# Patient Record
Sex: FEMALE | Race: WHITE | Employment: FULL TIME | URBAN - METROPOLITAN AREA
[De-identification: names, ages, dates, MRNs, and addresses within clinical notes are randomized per-mention and may not be internally consistent; named-entity substitution may affect disease eponyms.]

---

## 2019-09-17 ENCOUNTER — OFFICE VISIT (OUTPATIENT)
Dept: OBGYN CLINIC | Facility: CLINIC | Age: 54
End: 2019-09-17
Payer: COMMERCIAL

## 2019-09-17 VITALS — BODY MASS INDEX: 24.76 KG/M2 | DIASTOLIC BLOOD PRESSURE: 84 MMHG | WEIGHT: 142 LBS | SYSTOLIC BLOOD PRESSURE: 122 MMHG

## 2019-09-17 DIAGNOSIS — R30.0 DYSURIA: ICD-10-CM

## 2019-09-17 DIAGNOSIS — N81.2 INCOMPLETE UTEROVAGINAL PROLAPSE: Primary | ICD-10-CM

## 2019-09-17 LAB
BACTERIA UR QL AUTO: NORMAL /HPF
BILIRUB UR QL STRIP: NEGATIVE
CLARITY UR: CLEAR
COLOR UR: YELLOW
GLUCOSE UR STRIP-MCNC: NEGATIVE MG/DL
HGB UR QL STRIP.AUTO: ABNORMAL
HYALINE CASTS #/AREA URNS LPF: NORMAL /LPF
KETONES UR STRIP-MCNC: NEGATIVE MG/DL
LEUKOCYTE ESTERASE UR QL STRIP: ABNORMAL
NITRITE UR QL STRIP: NEGATIVE
NON-SQ EPI CELLS URNS QL MICRO: NORMAL /HPF
PH UR STRIP.AUTO: 6.5 [PH]
PROT UR STRIP-MCNC: NEGATIVE MG/DL
RBC #/AREA URNS AUTO: NORMAL /HPF
SP GR UR STRIP.AUTO: 1.01 (ref 1–1.03)
UROBILINOGEN UR QL STRIP.AUTO: 0.2 E.U./DL
WBC #/AREA URNS AUTO: NORMAL /HPF

## 2019-09-17 PROCEDURE — 99214 OFFICE O/P EST MOD 30 MIN: CPT | Performed by: OBSTETRICS & GYNECOLOGY

## 2019-09-17 PROCEDURE — 81001 URINALYSIS AUTO W/SCOPE: CPT | Performed by: OBSTETRICS & GYNECOLOGY

## 2019-09-17 PROCEDURE — 87086 URINE CULTURE/COLONY COUNT: CPT | Performed by: OBSTETRICS & GYNECOLOGY

## 2019-09-17 RX ORDER — ESTRADIOL 0.1 MG/G
1 CREAM VAGINAL 2 TIMES WEEKLY
Qty: 1 G | Refills: 1 | Status: SHIPPED | OUTPATIENT
Start: 2019-09-19 | End: 2019-10-01 | Stop reason: SDUPTHER

## 2019-09-17 RX ORDER — LEVOTHYROXINE SODIUM 0.12 MG/1
125 TABLET ORAL DAILY
COMMUNITY
Start: 2017-10-15 | End: 2020-02-18 | Stop reason: SDUPTHER

## 2019-09-17 NOTE — PROGRESS NOTES
CC:   Dysuria and pelvic pressure    HPI: Dinesh Epstein presents for evaluation of pelvic pressure that has been most noticeable over the past 2 weeks  The patient also has had episodes of dysuria  This patient who has not been seen by our office for almost 3 years, states that she has been slowly noticing pain and pressure in the pelvic area after prolonged standing and while sitting  She now claims to not be able to hold her urine with leakage  She had an ablation so she is not sure she is menopausal, but no in her family has ever had heat flashes or night sweats  Past Medical History:  No past medical history on file  Past Surgical History:  Past Surgical History:   Procedure Laterality Date    THYROIDECTOMY         Past OB/Gyn History:    Patient is status post ablation    ALLERGIES:   Allergies   Allergen Reactions    Morphine Other (See Comments)       MEDS:   Current Outpatient Medications:     levothyroxine 125 mcg tablet    [START ON 9/19/2019] estradiol (ESTRACE) 0 1 mg/g vaginal cream    Review of Systems:  Skin: No rashes or discolorations of any concern  RESP: Denies SOB, no cough  CV: Denies chest pain or palpitations  Breasts: Denies masses, pain, skin changes and nipple discharge  GI: Denies abdominal pain, heartburn, nausea, vomiting, changes in bowel habits  : Denies  frequency, CVA tenderness, and hematuria  Positive for dysuria,  and incontinence  Genitalia: Denies abnormal vaginal discharge, external lesions, rashes,  abnormal bleeding  Positive for pelvic pain,and pressure  Rectal:  Denies pain, bleeding, hemorrhoids,    Physical Exam:  /84 (BP Location: Left arm, Patient Position: Sitting, Cuff Size: Standard)   Wt 64 4 kg (142 lb)   BMI 24 76 kg/m²    Gen: The patient was alert and oriented x3, pleasant well-appearing female in no acute distress     Abd:  Soft, nontender, nondistended, no masses or organomegaly  Back:  No CVA tenderness, no tenderness to palpation along spine  Pelvic;  Normal appearing external female genitalia, no visible lesions, no rashes  Vagina shows evidence of a large midline cystocele with uterine descensus more than 2/3 of the way down the vaginal canal   There is significant descensus of the UV angle with straining  Normal appearing cervix, mobile and nontender  Uterus is atrophic in size, mobile and, nontender  No palpable adnexal masses or tenderness  No anoperineal lesions  Skin:  No concerning lesions  Extremeties: No edema      Assessment & Plan:   1  Incomplete uterovaginal prolapse with urethral hypermobility  I reviewed the findings with Wilma and discuss the options which included non intervention, pessary, and corrective surgery  After further discussion the patient is very much interested in pursuing a vaginal hysterectomy, cystocele repair with suburethral sling  Information regarding these procedures was given to the patient  Additionally, I strongly advocated that she get started on vaginal estrogen to further strengthen the tissue in preparation for the upcoming surgery which at this point looks to be late December  The patient is overdue for a Pap smear and she will return in October to have this performed with her yearly visit  She was encouraged to call with any questions and or concerns  I have spent 32 minutes with Patient  today in which greater than 50% of this time was spent in counseling/coordination of care regarding Prognosis, Risks and benefits of tx options, Intructions for management, Patient and family education, Importance of tx compliance and Risk factor reductions

## 2019-09-18 ENCOUNTER — TELEPHONE (OUTPATIENT)
Dept: OBGYN CLINIC | Facility: CLINIC | Age: 54
End: 2019-09-18

## 2019-09-18 LAB — BACTERIA UR CULT: ABNORMAL

## 2019-09-18 NOTE — TELEPHONE ENCOUNTER
----- Message from Abimael Haas MD sent at 9/18/2019  2:51 PM EDT -----  Please tell patient she did not have a bladder infection

## 2019-10-01 ENCOUNTER — ANNUAL EXAM (OUTPATIENT)
Dept: OBGYN CLINIC | Facility: CLINIC | Age: 54
End: 2019-10-01
Payer: COMMERCIAL

## 2019-10-01 VITALS — SYSTOLIC BLOOD PRESSURE: 110 MMHG | DIASTOLIC BLOOD PRESSURE: 80 MMHG | BODY MASS INDEX: 24.24 KG/M2 | WEIGHT: 139 LBS

## 2019-10-01 DIAGNOSIS — Z12.4 SCREENING FOR CERVICAL CANCER: ICD-10-CM

## 2019-10-01 DIAGNOSIS — Z12.31 ENCOUNTER FOR SCREENING MAMMOGRAM FOR MALIGNANT NEOPLASM OF BREAST: ICD-10-CM

## 2019-10-01 DIAGNOSIS — Z01.411 ENCOUNTER FOR GYNECOLOGICAL EXAMINATION WITH ABNORMAL FINDING: Primary | ICD-10-CM

## 2019-10-01 DIAGNOSIS — N81.2 INCOMPLETE UTEROVAGINAL PROLAPSE: ICD-10-CM

## 2019-10-01 PROCEDURE — G0145 SCR C/V CYTO,THINLAYER,RESCR: HCPCS | Performed by: OBSTETRICS & GYNECOLOGY

## 2019-10-01 PROCEDURE — 99396 PREV VISIT EST AGE 40-64: CPT | Performed by: OBSTETRICS & GYNECOLOGY

## 2019-10-01 PROCEDURE — 87624 HPV HI-RISK TYP POOLED RSLT: CPT | Performed by: OBSTETRICS & GYNECOLOGY

## 2019-10-01 RX ORDER — ESTRADIOL 0.1 MG/G
1 CREAM VAGINAL 2 TIMES WEEKLY
Qty: 1 G | Refills: 1 | Status: SHIPPED | OUTPATIENT
Start: 2019-10-03 | End: 2022-05-28

## 2019-10-01 NOTE — PROGRESS NOTES
CC:  Annual exam    HPI: Messi Farooq presents for yearly visit  Wilma was seen 2 weeks ago where she had incomplete uterovaginal prolapse  She had further questions in relation to the procedure  She also needed a repeat print of the prescription for Estrace cream     Past Medical History:  History reviewed  No pertinent past medical history  Past Surgical History:  Past Surgical History:   Procedure Laterality Date    THYROIDECTOMY         Past OB/Gyn History:  Patient is menopausal   Denies any history of sexually transmitted infection  No history of abnormal pap smears  Her last pap smear was over 3 years ago  ALLERGIES:   Allergies   Allergen Reactions    Morphine Other (See Comments)       MEDS:   Current Outpatient Medications:     [START ON 10/3/2019] estradiol (ESTRACE) 0 1 mg/g vaginal cream    levothyroxine 125 mcg tablet    Family History:  History reviewed  No pertinent family history      Social History:  Social History     Socioeconomic History    Marital status: /Civil Union     Spouse name: Not on file    Number of children: Not on file    Years of education: Not on file    Highest education level: Not on file   Occupational History    Not on file   Social Needs    Financial resource strain: Not on file    Food insecurity:     Worry: Not on file     Inability: Not on file    Transportation needs:     Medical: Not on file     Non-medical: Not on file   Tobacco Use    Smoking status: Smoker, Current Status Unknown     Types: Cigarettes    Smokeless tobacco: Never Used   Substance and Sexual Activity    Alcohol use: Yes     Comment: occasionally    Drug use: Not Currently    Sexual activity: Not Currently   Lifestyle    Physical activity:     Days per week: Not on file     Minutes per session: Not on file    Stress: Not on file   Relationships    Social connections:     Talks on phone: Not on file     Gets together: Not on file     Attends Baptism service: Not on file     Active member of club or organization: Not on file     Attends meetings of clubs or organizations: Not on file     Relationship status: Not on file    Intimate partner violence:     Fear of current or ex partner: Not on file     Emotionally abused: Not on file     Physically abused: Not on file     Forced sexual activity: Not on file   Other Topics Concern    Not on file   Social History Narrative    Not on file         Review of Systems:  Gen:   Denies fatigue, chills, nausea, vomiting, fever  Skin: No rashes or discolorations of any concern  RESP: Denies SOB, no cough  CV: Denies chest pain or palpitations  Breasts: Denies masses, pain, skin changes and nipple discharge  GI: Denies abdominal pain, heartburn, nausea, vomiting, changes in bowel habits  : Denies dysuria, frequency, CVA tenderness, incontinence and hematuria  Genitalia: Denies abnormal vaginal discharge, external lesions, rashes, pelvic pain, pressure, abnormal bleeding  Rectal:  Denies pain, bleeding, hemorrhoids,    Physical Exam:  /80 (BP Location: Left arm, Patient Position: Sitting, Cuff Size: Standard)   Wt 63 kg (139 lb)   LMP  (Exact Date)   BMI 24 24 kg/m²    Gen: The patient was alert and oriented x3, pleasant well-appearing female in no acute distress  Neck:  Unremarkable, no lymphadenopathy, no thyromegaly, or tenderness  CV:  RRR, no murmurs  Resp:  Clear to auscultation bilaterally, no wheezing  Breasts: Symmetric  No dominant, discrete, fixed  or suspicious masses are noted  No skin or nipple changes  No palpable axillary nodes  No supraclavicular adenopathy  Abd:  Soft, nontender, nondistended, no masses or organomegaly  Back:  No CVA tenderness, no tenderness to palpation along spine  Pelvic  Normal appearing external female genitalia, no visible lesions, no rashes  Noted is a marked midline cystocele and evidence of the uterine prolapse almost to the hymenal ring area   Normal appearing cervix, mobile and nontender  A thin prep pap smear was obtained today  Uterus is normal size, mobile and, nontender  No palpable adnexal masses or tenderness  No anoperineal lesions  Rectal:  No masses, tenderness, hemorrhoids, or obvious blood  Skin:  No concerning lesions  Extremeties: No edema      Assessment & Plan:   1  Routine annual exam    Pap smear performed  RTO one year orPRN  2  Encounter for screening mammogram, referral for mammogram given to patient  3    Incomplete uterovaginal prolapse  Patient will be seen in several weeks to review the procedure, and to sign surgical consent form

## 2019-10-02 LAB
HPV HR 12 DNA CVX QL NAA+PROBE: NEGATIVE
HPV16 DNA CVX QL NAA+PROBE: NEGATIVE
HPV18 DNA CVX QL NAA+PROBE: NEGATIVE

## 2019-10-07 LAB
LAB AP GYN PRIMARY INTERPRETATION: NORMAL
Lab: NORMAL

## 2019-10-14 ENCOUNTER — OFFICE VISIT (OUTPATIENT)
Dept: OBGYN CLINIC | Facility: CLINIC | Age: 54
End: 2019-10-14
Payer: COMMERCIAL

## 2019-10-14 VITALS — BODY MASS INDEX: 23.89 KG/M2 | SYSTOLIC BLOOD PRESSURE: 122 MMHG | WEIGHT: 137 LBS | DIASTOLIC BLOOD PRESSURE: 82 MMHG

## 2019-10-14 DIAGNOSIS — N81.2 UTEROVAGINAL PROLAPSE, INCOMPLETE: Primary | ICD-10-CM

## 2019-10-14 DIAGNOSIS — N36.41 URETHRAL HYPERMOBILITY: ICD-10-CM

## 2019-10-14 PROCEDURE — 99214 OFFICE O/P EST MOD 30 MIN: CPT | Performed by: OBSTETRICS & GYNECOLOGY

## 2019-10-14 NOTE — PROGRESS NOTES
CC:   Cystocele with urethral hypermobility and uterine prolapse  HPI: My Main presents for discussion and scheduling of surgery as it relates to her large cystocele, urethral hypermobility and   Uterine prolapse  This patient who was found to have a large cystocele, urinary leakage and urethral hypermobility in addition to uterine prolapse, would like to proceed with the recommendations that we previously discussed  This would be a cystocele repair, TVT O procedure with cystoscopy, and a vaginal hysterectomy  The patient and I reviewed the risks and benefits, pros and cons of the procedure, including alternatives  A pamphlet, going over the procedure was also given to the patient  The patient agrees to the same and was personally consented by myself  Past Medical History:  No past medical history on file  Past Surgical History:  Past Surgical History:   Procedure Laterality Date    THYROIDECTOMY         Past OB/Gyn History:    Patient is menopausal   Denies any history of sexually transmitted infection  No history of abnormal pap smears  Her last pap smear was this month and normal     ALLERGIES:   Allergies   Allergen Reactions    Morphine Other (See Comments)       MEDS:   Current Outpatient Medications:     estradiol (ESTRACE) 0 1 mg/g vaginal cream    levothyroxine 125 mcg tablet    Family History:  No family history on file      Social History:  Social History     Socioeconomic History    Marital status: /Civil Union     Spouse name: Not on file    Number of children: Not on file    Years of education: Not on file    Highest education level: Not on file   Occupational History    Not on file   Social Needs    Financial resource strain: Not on file    Food insecurity:     Worry: Not on file     Inability: Not on file    Transportation needs:     Medical: Not on file     Non-medical: Not on file   Tobacco Use    Smoking status: Smoker, Current Status Unknown     Types: Cigarettes    Smokeless tobacco: Never Used   Substance and Sexual Activity    Alcohol use: Yes     Comment: occasionally    Drug use: Not Currently    Sexual activity: Not Currently   Lifestyle    Physical activity:     Days per week: Not on file     Minutes per session: Not on file    Stress: Not on file   Relationships    Social connections:     Talks on phone: Not on file     Gets together: Not on file     Attends Quaker service: Not on file     Active member of club or organization: Not on file     Attends meetings of clubs or organizations: Not on file     Relationship status: Not on file    Intimate partner violence:     Fear of current or ex partner: Not on file     Emotionally abused: Not on file     Physically abused: Not on file     Forced sexual activity: Not on file   Other Topics Concern    Not on file   Social History Narrative    Not on file         Review of Systems:  Gen:   Denies fatigue, chills, nausea, vomiting, fever  Skin: No rashes or discolorations of any concern  RESP: Denies SOB, no cough  CV: Denies chest pain or palpitations  Breasts: Denies masses, pain, skin changes and nipple discharge  GI: Denies abdominal pain, heartburn, nausea, vomiting, changes in bowel habits  : Denies dysuria, frequency, CVA tenderness, incontinence and hematuria  Genitalia: Denies abnormal vaginal discharge, external lesions, rashes, pelvic pain, pressure, abnormal bleeding  Rectal:  Denies pain, bleeding, hemorrhoids,    Physical Exam:  /82 (BP Location: Left arm, Patient Position: Sitting, Cuff Size: Standard)   Wt 62 1 kg (137 lb)   BMI 23 89 kg/m²    Gen: The patient was alert and oriented x3, pleasant well-appearing female in no acute distress  Neck:  Unremarkable, no lymphadenopathy, no thyromegaly, or tenderness    CV:  RRR, no murmurs  Resp:  Clear to auscultation bilaterally, no wheezing  Abd:  Soft, nontender, nondistended, no masses or organomegaly  Back:  No CVA tenderness, no tenderness to palpation along spine  Pelvic  Normal appearing external female genitalia, no visible lesions, no rashes  Vagina is free of discharge, normal vaginal epithelium, no abnormal  Lesions, moderate cystocele with urethral hypermobility is noted  The cervix projects down to the hymenal ring area and the uterus is normal size hyper mobile and nontender  No anoperineal lesions  Rectal:  No masses, tenderness, hemorrhoids, or obvious blood  Skin:  No concerning lesions  Extremeties: No edema      Assessment & Plan:   1  Uterine prolapse, incomplete with cystocele and urethral hypermobility  Plan is for vaginal hysterectomy, cystocele repair and TVT O procedure with cystoscopy

## 2019-10-18 PROBLEM — N36.41 URETHRAL HYPERMOBILITY: Status: ACTIVE | Noted: 2019-10-18

## 2019-10-18 PROBLEM — N81.2 INCOMPLETE UTEROVAGINAL PROLAPSE: Status: ACTIVE | Noted: 2019-10-18

## 2019-10-21 ENCOUNTER — TELEPHONE (OUTPATIENT)
Dept: OBGYN CLINIC | Facility: CLINIC | Age: 54
End: 2019-10-21

## 2019-10-21 NOTE — TELEPHONE ENCOUNTER
Spoke to Mean at [facility] no prior Authorization is needed for CPT [id number] and [id number] being performed on 11/13/2019 with Dr Lizet Hines       Ref# [id number]

## 2019-10-28 ENCOUNTER — APPOINTMENT (OUTPATIENT)
Dept: LAB | Facility: HOSPITAL | Age: 54
End: 2019-10-28
Payer: COMMERCIAL

## 2019-10-28 DIAGNOSIS — Z01.818 PRE-OP TESTING: ICD-10-CM

## 2019-10-28 LAB
ABO GROUP BLD: NORMAL
ALBUMIN SERPL BCP-MCNC: 4.1 G/DL (ref 3–5.2)
ALP SERPL-CCNC: 54 U/L (ref 43–122)
ALT SERPL W P-5'-P-CCNC: 10 U/L (ref 9–52)
ANION GAP SERPL CALCULATED.3IONS-SCNC: 7 MMOL/L (ref 5–14)
AST SERPL W P-5'-P-CCNC: 30 U/L (ref 14–36)
BILIRUB SERPL-MCNC: 0.3 MG/DL
BLD GP AB SCN SERPL QL: NEGATIVE
BUN SERPL-MCNC: 17 MG/DL (ref 5–25)
CALCIUM SERPL-MCNC: 9.6 MG/DL (ref 8.4–10.2)
CHLORIDE SERPL-SCNC: 107 MMOL/L (ref 97–108)
CO2 SERPL-SCNC: 25 MMOL/L (ref 22–30)
CREAT SERPL-MCNC: 0.66 MG/DL (ref 0.6–1.2)
ERYTHROCYTE [DISTWIDTH] IN BLOOD BY AUTOMATED COUNT: 14.7 %
EST. AVERAGE GLUCOSE BLD GHB EST-MCNC: 114 MG/DL
GFR SERPL CREATININE-BSD FRML MDRD: 100 ML/MIN/1.73SQ M
GLUCOSE P FAST SERPL-MCNC: 91 MG/DL (ref 70–99)
HBA1C MFR BLD: 5.6 % (ref 4.2–6.3)
HCT VFR BLD AUTO: 43.5 % (ref 36–46)
HGB BLD-MCNC: 14.2 G/DL (ref 12–16)
MCH RBC QN AUTO: 29.5 PG (ref 26–34)
MCHC RBC AUTO-ENTMCNC: 32.7 G/DL (ref 31–36)
MCV RBC AUTO: 90 FL (ref 80–100)
PLATELET # BLD AUTO: 231 THOUSANDS/UL (ref 150–450)
PMV BLD AUTO: 9.4 FL (ref 8.9–12.7)
POTASSIUM SERPL-SCNC: 4.1 MMOL/L (ref 3.6–5)
PROT SERPL-MCNC: 7.2 G/DL (ref 5.9–8.4)
RBC # BLD AUTO: 4.81 MILLION/UL (ref 4–5.2)
RH BLD: POSITIVE
SODIUM SERPL-SCNC: 139 MMOL/L (ref 137–147)
SPECIMEN EXPIRATION DATE: NORMAL
WBC # BLD AUTO: 8.6 THOUSAND/UL (ref 4.5–11)

## 2019-10-28 PROCEDURE — 86900 BLOOD TYPING SEROLOGIC ABO: CPT

## 2019-10-28 PROCEDURE — 36415 COLL VENOUS BLD VENIPUNCTURE: CPT

## 2019-10-28 PROCEDURE — 86850 RBC ANTIBODY SCREEN: CPT

## 2019-10-28 PROCEDURE — 80053 COMPREHEN METABOLIC PANEL: CPT

## 2019-10-28 PROCEDURE — 83036 HEMOGLOBIN GLYCOSYLATED A1C: CPT

## 2019-10-28 PROCEDURE — 85027 COMPLETE CBC AUTOMATED: CPT

## 2019-10-28 PROCEDURE — 86901 BLOOD TYPING SEROLOGIC RH(D): CPT

## 2019-11-05 ENCOUNTER — TELEPHONE (OUTPATIENT)
Dept: OBGYN CLINIC | Facility: CLINIC | Age: 54
End: 2019-11-05

## 2019-11-05 NOTE — TELEPHONE ENCOUNTER
Patient called and stated she was scheduled for a procedure on 11/13/2019 and needed to reschedule this because of a family emergency  Patient states she would like to push back the surgery to May of 2020  Patient was given a surgery date of 5/27/2020

## 2019-11-05 NOTE — TELEPHONE ENCOUNTER
Spoke to Kate at [employer] no prior authorization is needed for CPT code 97147 being performed on 11/13/2019 with Dr Ashwin Lazar

## 2019-11-05 NOTE — TELEPHONE ENCOUNTER
Okay thanks for the update  There was a patient that may want that date since I believe she wish to have a as soon as possible surgery date  Please get in touch with me

## 2019-11-29 ENCOUNTER — TELEPHONE (OUTPATIENT)
Dept: OBGYN CLINIC | Facility: CLINIC | Age: 54
End: 2019-11-29

## 2020-05-05 ENCOUNTER — TELEPHONE (OUTPATIENT)
Dept: OBGYN CLINIC | Facility: CLINIC | Age: 55
End: 2020-05-05

## 2020-05-26 ENCOUNTER — TELEPHONE (OUTPATIENT)
Dept: OBGYN CLINIC | Facility: CLINIC | Age: 55
End: 2020-05-26

## 2020-10-16 ENCOUNTER — OFFICE VISIT (OUTPATIENT)
Dept: OBGYN CLINIC | Facility: CLINIC | Age: 55
End: 2020-10-16
Payer: COMMERCIAL

## 2020-10-16 VITALS
HEIGHT: 63 IN | WEIGHT: 134.6 LBS | DIASTOLIC BLOOD PRESSURE: 80 MMHG | SYSTOLIC BLOOD PRESSURE: 106 MMHG | BODY MASS INDEX: 23.85 KG/M2 | TEMPERATURE: 97.6 F

## 2020-10-16 DIAGNOSIS — R10.2 PELVIC PAIN: Primary | ICD-10-CM

## 2020-10-16 LAB
BACTERIA UR QL AUTO: ABNORMAL /HPF
BILIRUB UR QL STRIP: NEGATIVE
CLARITY UR: CLEAR
COLOR UR: YELLOW
GLUCOSE UR STRIP-MCNC: NEGATIVE MG/DL
HGB UR QL STRIP.AUTO: ABNORMAL
HYALINE CASTS #/AREA URNS LPF: ABNORMAL /LPF
KETONES UR STRIP-MCNC: NEGATIVE MG/DL
LEUKOCYTE ESTERASE UR QL STRIP: ABNORMAL
NITRITE UR QL STRIP: NEGATIVE
NON-SQ EPI CELLS URNS QL MICRO: ABNORMAL /HPF
PH UR STRIP.AUTO: 6.5 [PH]
PROT UR STRIP-MCNC: NEGATIVE MG/DL
RBC #/AREA URNS AUTO: ABNORMAL /HPF
SP GR UR STRIP.AUTO: 1.01 (ref 1–1.03)
UROBILINOGEN UR QL STRIP.AUTO: 0.2 E.U./DL
WBC #/AREA URNS AUTO: ABNORMAL /HPF

## 2020-10-16 PROCEDURE — 81001 URINALYSIS AUTO W/SCOPE: CPT | Performed by: OBSTETRICS & GYNECOLOGY

## 2020-10-16 PROCEDURE — 99213 OFFICE O/P EST LOW 20 MIN: CPT | Performed by: OBSTETRICS & GYNECOLOGY

## 2020-10-16 PROCEDURE — 87086 URINE CULTURE/COLONY COUNT: CPT | Performed by: OBSTETRICS & GYNECOLOGY

## 2020-10-16 RX ORDER — LEVOTHYROXINE SODIUM 0.1 MG/1
100 TABLET ORAL DAILY
COMMUNITY
Start: 2020-10-07

## 2020-10-17 LAB — BACTERIA UR CULT: NORMAL

## 2020-10-19 ENCOUNTER — TELEPHONE (OUTPATIENT)
Dept: OBGYN CLINIC | Facility: CLINIC | Age: 55
End: 2020-10-19

## 2020-11-03 ENCOUNTER — ULTRASOUND (OUTPATIENT)
Dept: OBGYN CLINIC | Facility: CLINIC | Age: 55
End: 2020-11-03
Payer: COMMERCIAL

## 2020-11-03 DIAGNOSIS — R10.2 PELVIC PAIN: Primary | ICD-10-CM

## 2020-11-03 PROCEDURE — 76830 TRANSVAGINAL US NON-OB: CPT | Performed by: OBSTETRICS & GYNECOLOGY

## 2021-03-10 DIAGNOSIS — Z23 ENCOUNTER FOR IMMUNIZATION: ICD-10-CM

## 2021-06-10 ENCOUNTER — TELEPHONE (OUTPATIENT)
Dept: OBGYN CLINIC | Facility: CLINIC | Age: 56
End: 2021-06-10

## 2021-06-10 NOTE — TELEPHONE ENCOUNTER
----- Message from Vernon Godoy MD sent at 6/9/2021  5:34 PM EDT -----  Regarding: RE: [address] [facility] , okay !  ----- Message -----  From: Catherine Ball MA  Sent: 6/9/2021   3:58 PM EDT  To: Vernon Godoy MD  Subject: SURGERY                                          Patient daughter called stating patient was scheduled for a HYSTERECTOMY VAGINAL TOTAL cystocele repair , INSERTION PUBOVAGINAL SLING   with cystocopy   ANTERIOR COLPORRHAPHY on 5/27/2020 that was canceled due to María  Patient daughter stated that Wilma prolapse has gotten worse and needs to reschedule the surgery  She has an appointment on 6/15/2021  to come in and be checked and schedule surgery  Patient is written in the surgery book for 7/14/2021

## 2021-06-15 ENCOUNTER — OFFICE VISIT (OUTPATIENT)
Dept: OBGYN CLINIC | Facility: CLINIC | Age: 56
End: 2021-06-15
Payer: COMMERCIAL

## 2021-06-15 VITALS
HEIGHT: 63 IN | WEIGHT: 139.4 LBS | SYSTOLIC BLOOD PRESSURE: 110 MMHG | DIASTOLIC BLOOD PRESSURE: 68 MMHG | BODY MASS INDEX: 24.7 KG/M2

## 2021-06-15 DIAGNOSIS — N36.41 URETHRAL HYPERMOBILITY: ICD-10-CM

## 2021-06-15 DIAGNOSIS — N81.11 MIDLINE CYSTOCELE: Primary | ICD-10-CM

## 2021-06-15 DIAGNOSIS — N81.2 INCOMPLETE UTEROVAGINAL PROLAPSE: ICD-10-CM

## 2021-06-15 PROCEDURE — 99214 OFFICE O/P EST MOD 30 MIN: CPT | Performed by: OBSTETRICS & GYNECOLOGY

## 2021-06-15 NOTE — PROGRESS NOTES
CC:   prolapse    HPI: Delmar Lawler presents for re-examination and discussion of her prolapse issues  This patient who is known to me for having a cystocele and stress urinary incontinence, also had partial uterine prolapse  The patient 2 years ago was prepared to undergo corrective surgery but because of COVID decided to postpone this  She is now seen today also desirous of surgical intervention  She continues to have leakage with coughing and sneezing along with sensation of incomplete emptying and pelvic pressure  Past Medical History:  History reviewed  No pertinent past medical history  Past Surgical History:  Past Surgical History:   Procedure Laterality Date    THYROIDECTOMY         Past OB/Gyn History:    Noncontributory    ALLERGIES:   Allergies   Allergen Reactions    Morphine Other (See Comments)       MEDS:   Current Outpatient Medications:     ergocalciferol (VITAMIN D2) 50,000 units    estradiol (ESTRACE) 0 1 mg/g vaginal cream    levothyroxine 100 mcg tablet    levothyroxine 125 mcg tablet    Family History:  History reviewed  No pertinent family history      Social History:  Social History     Socioeconomic History    Marital status: /Civil Union     Spouse name: Not on file    Number of children: Not on file    Years of education: Not on file    Highest education level: Not on file   Occupational History    Not on file   Tobacco Use    Smoking status: Smoker, Current Status Unknown     Packs/day: 0 25     Types: Cigarettes    Smokeless tobacco: Never Used   Substance and Sexual Activity    Alcohol use: Yes     Comment: occasionally    Drug use: Not Currently    Sexual activity: Not Currently   Other Topics Concern    Not on file   Social History Narrative    Not on file     Social Determinants of Health     Financial Resource Strain:     Difficulty of Paying Living Expenses:    Food Insecurity:     Worried About Running Out of Food in the Last Year:     Ran Out of Food in the Last Year:    Transportation Needs:     Lack of Transportation (Medical):  Lack of Transportation (Non-Medical):    Physical Activity:     Days of Exercise per Week:     Minutes of Exercise per Session:    Stress:     Feeling of Stress :    Social Connections:     Frequency of Communication with Friends and Family:     Frequency of Social Gatherings with Friends and Family:     Attends Christian Services:     Active Member of Clubs or Organizations:     Attends Club or Organization Meetings:     Marital Status:    Intimate Partner Violence:     Fear of Current or Ex-Partner:     Emotionally Abused:     Physically Abused:     Sexually Abused:          Review of Systems:  Gen:   Denies fatigue, chills, nausea, vomiting, fever  Skin: No rashes or discolorations of any concern  RESP: Denies SOB, no cough  CV: Denies chest pain or palpitations  Breasts: Denies masses, pain, skin changes and nipple discharge  GI: Denies abdominal pain, heartburn, nausea, vomiting, changes in bowel habits  : Denies dysuria, frequency, CVA tenderness, and hematuria  She does have stress urinary incontinence  Genitalia: Denies abnormal vaginal discharge, external lesions, rashes, pelvic pain,or abnormal bleeding  She does have pelvic pressure  Rectal:  Denies pain, bleeding, hemorrhoids,    Physical Exam:  /68 (BP Location: Left arm, Patient Position: Sitting, Cuff Size: Standard)   Ht 5' 2 5" (1 588 m)   Wt 63 2 kg (139 lb 6 4 oz)   BMI 25 09 kg/m²    Gen: The patient was alert and oriented x3, pleasant well-appearing female in no acute distress  Neck:  Unremarkable, no lymphadenopathy, no thyromegaly, or tenderness  CV:  RRR, no murmurs  Resp:  Clear to auscultation bilaterally, no wheezing  Breasts: Symmetric  No dominant, discrete, fixed  or suspicious masses are noted  No skin or nipple changes  No palpable axillary nodes  No supraclavicular adenopathy    Abd:  Soft, nontender, nondistended, no masses or organomegaly  Back:  No CVA tenderness, no tenderness to palpation along spine  Pelvic:  Normal appearing external female genitalia, no visible lesions, no rashes  Vagina is free of discharge, normal vaginal epithelium, no abnormal  lesions,   There is a prominent midline cystocele along with significant urethral deflection  Normal appearing cervix, mobile and nontender  Uterus is atrophic in size and descends with straining midway down the vaginal canal   There is no uterine tenderness to palpation  No palpable adnexal masses or tenderness  No anoperineal lesions  Rectal:  No masses, tenderness, hemorrhoids, or obvious blood  Skin:  No concerning lesions  Extremeties: No edema      Assessment & Plan:   1  Cystocele along with urethral hypermobility and CAPRICE  Plan is for cystocele repair along with TVT O and cystoscopy  2    Incomplete uterine prolapse  Plan would be a sacral spinous ligament fixation if necessary following the above repairs  The patient was personally counseled by myself and is in agreement with the plan as outlined above  She was personally consented by myself as well

## 2021-06-15 NOTE — H&P (VIEW-ONLY)
CC:   prolapse    HPI: Zohaib French presents for re-examination and discussion of her prolapse issues  This patient who is known to me for having a cystocele and stress urinary incontinence, also had partial uterine prolapse  The patient 2 years ago was prepared to undergo corrective surgery but because of COVID decided to postpone this  She is now seen today also desirous of surgical intervention  She continues to have leakage with coughing and sneezing along with sensation of incomplete emptying and pelvic pressure  Past Medical History:  History reviewed  No pertinent past medical history  Past Surgical History:  Past Surgical History:   Procedure Laterality Date    THYROIDECTOMY         Past OB/Gyn History:    Noncontributory    ALLERGIES:   Allergies   Allergen Reactions    Morphine Other (See Comments)       MEDS:   Current Outpatient Medications:     ergocalciferol (VITAMIN D2) 50,000 units    estradiol (ESTRACE) 0 1 mg/g vaginal cream    levothyroxine 100 mcg tablet    levothyroxine 125 mcg tablet    Family History:  History reviewed  No pertinent family history      Social History:  Social History     Socioeconomic History    Marital status: /Civil Union     Spouse name: Not on file    Number of children: Not on file    Years of education: Not on file    Highest education level: Not on file   Occupational History    Not on file   Tobacco Use    Smoking status: Smoker, Current Status Unknown     Packs/day: 0 25     Types: Cigarettes    Smokeless tobacco: Never Used   Substance and Sexual Activity    Alcohol use: Yes     Comment: occasionally    Drug use: Not Currently    Sexual activity: Not Currently   Other Topics Concern    Not on file   Social History Narrative    Not on file     Social Determinants of Health     Financial Resource Strain:     Difficulty of Paying Living Expenses:    Food Insecurity:     Worried About Running Out of Food in the Last Year:     Ran Out of Food in the Last Year:    Transportation Needs:     Lack of Transportation (Medical):  Lack of Transportation (Non-Medical):    Physical Activity:     Days of Exercise per Week:     Minutes of Exercise per Session:    Stress:     Feeling of Stress :    Social Connections:     Frequency of Communication with Friends and Family:     Frequency of Social Gatherings with Friends and Family:     Attends [employer] Services:     Active Member of Clubs or Organizations:     Attends Club or Organization Meetings:     Marital Status:    Intimate Partner Violence:     Fear of Current or Ex-Partner:     Emotionally Abused:     Physically Abused:     Sexually Abused:          Review of Systems:  Gen:   Denies fatigue, chills, nausea, vomiting, fever  Skin: No rashes or discolorations of any concern  RESP: Denies SOB, no cough  CV: Denies chest pain or palpitations  Breasts: Denies masses, pain, skin changes and nipple discharge  GI: Denies abdominal pain, heartburn, nausea, vomiting, changes in bowel habits  : Denies dysuria, frequency, CVA tenderness, and hematuria  She does have stress urinary incontinence  Genitalia: Denies abnormal vaginal discharge, external lesions, rashes, pelvic pain,or abnormal bleeding  She does have pelvic pressure  Rectal:  Denies pain, bleeding, hemorrhoids,    Physical Exam:  /68 (BP Location: Left arm, Patient Position: Sitting, Cuff Size: Standard)   Ht 5' 2 5" (1 588 m)   Wt 63 2 kg (139 lb 6 4 oz)   BMI 25 09 kg/m²    Gen: The patient was alert and oriented x3, pleasant well-appearing female in no acute distress  Neck:  Unremarkable, no lymphadenopathy, no thyromegaly, or tenderness  CV:  RRR, no murmurs  Resp:  Clear to auscultation bilaterally, no wheezing  Breasts: Symmetric  No dominant, discrete, fixed  or suspicious masses are noted  No skin or nipple changes  No palpable axillary nodes  No supraclavicular adenopathy    Abd:  Soft, nontender, nondistended, no masses or organomegaly  Back:  No CVA tenderness, no tenderness to palpation along spine  Pelvic:  Normal appearing external female genitalia, no visible lesions, no rashes  Vagina is free of discharge, normal vaginal epithelium, no abnormal  lesions,   There is a prominent midline cystocele along with significant urethral deflection  Normal appearing cervix, mobile and nontender  Uterus is atrophic in size and descends with straining midway down the vaginal canal   There is no uterine tenderness to palpation  No palpable adnexal masses or tenderness  No anoperineal lesions  Rectal:  No masses, tenderness, hemorrhoids, or obvious blood  Skin:  No concerning lesions  Extremeties: No edema      Assessment & Plan:   1  Cystocele along with urethral hypermobility and CAPRICE  Plan is for cystocele repair along with TVT O and cystoscopy  2    Incomplete uterine prolapse  Plan would be a sacral spinous ligament fixation if necessary following the above repairs  The patient was personally counseled by myself and is in agreement with the plan as outlined above  She was personally consented by myself as well

## 2021-06-16 ENCOUNTER — TELEPHONE (OUTPATIENT)
Dept: OBGYN CLINIC | Facility: CLINIC | Age: 56
End: 2021-06-16

## 2021-06-16 NOTE — TELEPHONE ENCOUNTER
I spoke to EG at [address] [place] [address] Road on 6/16/2021 at 10:16am no prior authorization is needed for CPT code [id number] being performed on 7/14/2021        REF # [id number]

## 2021-07-02 ENCOUNTER — LAB (OUTPATIENT)
Dept: LAB | Facility: HOSPITAL | Age: 56
End: 2021-07-02
Payer: COMMERCIAL

## 2021-07-02 ENCOUNTER — LAB REQUISITION (OUTPATIENT)
Dept: LAB | Facility: HOSPITAL | Age: 56
End: 2021-07-02
Payer: COMMERCIAL

## 2021-07-02 DIAGNOSIS — Z01.818 PRE-OP TESTING: ICD-10-CM

## 2021-07-02 DIAGNOSIS — Z01.818 ENCOUNTER FOR OTHER PREPROCEDURAL EXAMINATION: ICD-10-CM

## 2021-07-02 LAB
ABO GROUP BLD: NORMAL
BLD GP AB SCN SERPL QL: NEGATIVE
ERYTHROCYTE [DISTWIDTH] IN BLOOD BY AUTOMATED COUNT: 14.8 %
EST. AVERAGE GLUCOSE BLD GHB EST-MCNC: 108 MG/DL
HBA1C MFR BLD: 5.4 %
HCT VFR BLD AUTO: 43.5 % (ref 36–46)
HGB BLD-MCNC: 15 G/DL (ref 12–16)
MCH RBC QN AUTO: 30.6 PG (ref 26–34)
MCHC RBC AUTO-ENTMCNC: 34.4 G/DL (ref 31–36)
MCV RBC AUTO: 89 FL (ref 80–100)
PLATELET # BLD AUTO: 221 THOUSANDS/UL (ref 150–450)
PMV BLD AUTO: 9.3 FL (ref 8.9–12.7)
RBC # BLD AUTO: 4.89 MILLION/UL (ref 4–5.2)
RH BLD: POSITIVE
SPECIMEN EXPIRATION DATE: NORMAL
WBC # BLD AUTO: 7.5 THOUSAND/UL (ref 4.5–11)

## 2021-07-02 PROCEDURE — 86850 RBC ANTIBODY SCREEN: CPT

## 2021-07-02 PROCEDURE — 36415 COLL VENOUS BLD VENIPUNCTURE: CPT

## 2021-07-02 PROCEDURE — 86900 BLOOD TYPING SEROLOGIC ABO: CPT

## 2021-07-02 PROCEDURE — 85027 COMPLETE CBC AUTOMATED: CPT

## 2021-07-02 PROCEDURE — 83036 HEMOGLOBIN GLYCOSYLATED A1C: CPT

## 2021-07-02 PROCEDURE — 93005 ELECTROCARDIOGRAM TRACING: CPT

## 2021-07-02 PROCEDURE — 86901 BLOOD TYPING SEROLOGIC RH(D): CPT

## 2021-07-06 ENCOUNTER — TELEPHONE (OUTPATIENT)
Dept: OBGYN CLINIC | Facility: CLINIC | Age: 56
End: 2021-07-06

## 2021-07-06 DIAGNOSIS — N39.0 URINARY TRACT INFECTION WITHOUT HEMATURIA, SITE UNSPECIFIED: Primary | ICD-10-CM

## 2021-07-06 LAB
ATRIAL RATE: 66 BPM
P AXIS: 78 DEGREES
PR INTERVAL: 156 MS
QRS AXIS: 66 DEGREES
QRSD INTERVAL: 70 MS
QT INTERVAL: 398 MS
QTC INTERVAL: 417 MS
T WAVE AXIS: 61 DEGREES
VENTRICULAR RATE: 66 BPM

## 2021-07-06 PROCEDURE — 93010 ELECTROCARDIOGRAM REPORT: CPT | Performed by: INTERNAL MEDICINE

## 2021-07-06 RX ORDER — TERBINAFINE HYDROCHLORIDE 250 MG/1
250 TABLET ORAL
COMMUNITY
End: 2022-05-28

## 2021-07-06 RX ORDER — FOLIC ACID 0.8 MG
TABLET ORAL
COMMUNITY

## 2021-07-06 RX ORDER — AMOXICILLIN 500 MG
CAPSULE ORAL
COMMUNITY

## 2021-07-06 RX ORDER — NITROFURANTOIN 25; 75 MG/1; MG/1
100 CAPSULE ORAL 2 TIMES DAILY
Qty: 14 CAPSULE | Refills: 0 | Status: SHIPPED | OUTPATIENT
Start: 2021-07-06 | End: 2021-07-13

## 2021-07-06 NOTE — TELEPHONE ENCOUNTER
----- Message from Rahel Rowe sent at 7/6/2021 10:11 AM EDT -----  Regarding: Test Results Question  Contact: [phone]  Can someone please look over my moms bloodwork and tell me if there's anything wron? The EKG as well?

## 2021-07-06 NOTE — PRE-PROCEDURE INSTRUCTIONS
Pre-Surgery Instructions:   Medication Instructions    ergocalciferol (VITAMIN D2) 50,000 units Instructed patient per Anesthesia Guidelines  Stop 7/7    levothyroxine 100 mcg tablet Instructed patient per Anesthesia Guidelines  Take morning of surgery with sip water    Magnesium 500 MG CAPS Instructed patient per Anesthesia Guidelines  Stop 7/7     Multiple Vitamins-Minerals (ALIVE MULTI-VITAMIN PO) Instructed patient per Anesthesia Guidelines  Stop 7/7    Omega-3 Fatty Acids (Fish Oil) 1200 MG CAPS Instructed patient per Anesthesia Guidelines  Stop 7/7    terbinafine (LamISIL) 250 mg tablet Instructed patient per Anesthesia Guidelines  Takes nightly-take as directed    TURMERIC PO Instructed patient per Anesthesia Guidelines  Stop 7/7   Covid screening negative as per patient  Fully vaccinated  Reviewed pre op medicine and showering instructions with patient via phone call, verbalizes understanding  Advised patient to stop taking vitamins, herbal meds, NSAID'S and ASA pre op but may take Tylenol products if needed  Advised NPO after MN (7/13) and ASC will call (7/13) with surgical scheduled time  Pt verbalized understanding  Patient is interested in smoking cessation education-order placed

## 2021-07-13 ENCOUNTER — ANESTHESIA EVENT (OUTPATIENT)
Dept: PERIOP | Facility: HOSPITAL | Age: 56
End: 2021-07-13
Payer: COMMERCIAL

## 2021-07-14 ENCOUNTER — ANESTHESIA (OUTPATIENT)
Dept: PERIOP | Facility: HOSPITAL | Age: 56
End: 2021-07-14
Payer: COMMERCIAL

## 2021-07-14 ENCOUNTER — HOSPITAL ENCOUNTER (OUTPATIENT)
Facility: HOSPITAL | Age: 56
Setting detail: OUTPATIENT SURGERY
Discharge: HOME | End: 2021-07-14
Attending: OBSTETRICS & GYNECOLOGY | Admitting: OBSTETRICS & GYNECOLOGY
Payer: COMMERCIAL

## 2021-07-14 VITALS
SYSTOLIC BLOOD PRESSURE: 103 MMHG | OXYGEN SATURATION: 98 % | HEART RATE: 65 BPM | DIASTOLIC BLOOD PRESSURE: 67 MMHG | HEIGHT: 63 IN | TEMPERATURE: 97.7 F | RESPIRATION RATE: 16 BRPM | BODY MASS INDEX: 24.63 KG/M2 | WEIGHT: 139 LBS

## 2021-07-14 DIAGNOSIS — T65.291A TOXIC EFFECT OF TOBACCO AND NICOTINE, ACCIDENTAL OR UNINTENTIONAL, INITIAL ENCOUNTER: Primary | ICD-10-CM

## 2021-07-14 PROBLEM — F17.200 SMOKER: Status: ACTIVE | Noted: 2021-07-14

## 2021-07-14 PROCEDURE — 57288 REPAIR BLADDER DEFECT: CPT | Performed by: OBSTETRICS & GYNECOLOGY

## 2021-07-14 PROCEDURE — C1771 REP DEV, URINARY, W/SLING: HCPCS | Performed by: OBSTETRICS & GYNECOLOGY

## 2021-07-14 PROCEDURE — 57240 ANTERIOR COLPORRHAPHY: CPT | Performed by: OBSTETRICS & GYNECOLOGY

## 2021-07-14 PROCEDURE — C2631 REP DEV, URINARY, W/O SLING: HCPCS | Performed by: OBSTETRICS & GYNECOLOGY

## 2021-07-14 PROCEDURE — 57282 COLPOPEXY EXTRAPERITONEAL: CPT | Performed by: OBSTETRICS & GYNECOLOGY

## 2021-07-14 DEVICE — SYSTEM ANCHORSURE F/PELVIC FLOOR: Type: IMPLANTABLE DEVICE | Site: PELVIS | Status: FUNCTIONAL

## 2021-07-14 DEVICE — SLING TVT ABBREVO MINI: Type: IMPLANTABLE DEVICE | Site: VAGINA | Status: FUNCTIONAL

## 2021-07-14 RX ORDER — MAGNESIUM HYDROXIDE 1200 MG/15ML
LIQUID ORAL AS NEEDED
Status: DISCONTINUED | OUTPATIENT
Start: 2021-07-14 | End: 2021-07-14 | Stop reason: HOSPADM

## 2021-07-14 RX ORDER — IBUPROFEN 600 MG/1
600 TABLET ORAL EVERY 6 HOURS SCHEDULED
Status: COMPLETED | OUTPATIENT
Start: 2021-07-14 | End: 2021-07-14

## 2021-07-14 RX ORDER — SODIUM CHLORIDE 9 MG/ML
125 INJECTION, SOLUTION INTRAVENOUS CONTINUOUS
Status: DISCONTINUED | OUTPATIENT
Start: 2021-07-14 | End: 2021-07-14 | Stop reason: HOSPADM

## 2021-07-14 RX ORDER — PROPOFOL 10 MG/ML
INJECTION, EMULSION INTRAVENOUS AS NEEDED
Status: DISCONTINUED | OUTPATIENT
Start: 2021-07-14 | End: 2021-07-14

## 2021-07-14 RX ORDER — ONDANSETRON 2 MG/ML
INJECTION INTRAMUSCULAR; INTRAVENOUS AS NEEDED
Status: DISCONTINUED | OUTPATIENT
Start: 2021-07-14 | End: 2021-07-14

## 2021-07-14 RX ORDER — DEXAMETHASONE SODIUM PHOSPHATE 4 MG/ML
INJECTION, SOLUTION INTRA-ARTICULAR; INTRALESIONAL; INTRAMUSCULAR; INTRAVENOUS; SOFT TISSUE AS NEEDED
Status: DISCONTINUED | OUTPATIENT
Start: 2021-07-14 | End: 2021-07-14

## 2021-07-14 RX ORDER — MIDAZOLAM HYDROCHLORIDE 2 MG/2ML
INJECTION, SOLUTION INTRAMUSCULAR; INTRAVENOUS AS NEEDED
Status: DISCONTINUED | OUTPATIENT
Start: 2021-07-14 | End: 2021-07-14

## 2021-07-14 RX ORDER — FENTANYL CITRATE/PF 50 MCG/ML
25 SYRINGE (ML) INJECTION
Status: COMPLETED | OUTPATIENT
Start: 2021-07-14 | End: 2021-07-14

## 2021-07-14 RX ORDER — EPHEDRINE SULFATE 50 MG/ML
INJECTION INTRAVENOUS AS NEEDED
Status: DISCONTINUED | OUTPATIENT
Start: 2021-07-14 | End: 2021-07-14

## 2021-07-14 RX ORDER — FENTANYL CITRATE 50 UG/ML
INJECTION, SOLUTION INTRAMUSCULAR; INTRAVENOUS AS NEEDED
Status: DISCONTINUED | OUTPATIENT
Start: 2021-07-14 | End: 2021-07-14

## 2021-07-14 RX ORDER — CEFAZOLIN SODIUM 1 G/50ML
1000 SOLUTION INTRAVENOUS ONCE
Status: COMPLETED | OUTPATIENT
Start: 2021-07-14 | End: 2021-07-14

## 2021-07-14 RX ORDER — ONDANSETRON 2 MG/ML
4 INJECTION INTRAMUSCULAR; INTRAVENOUS ONCE AS NEEDED
Status: COMPLETED | OUTPATIENT
Start: 2021-07-14 | End: 2021-07-14

## 2021-07-14 RX ORDER — LIDOCAINE HYDROCHLORIDE 20 MG/ML
INJECTION, SOLUTION EPIDURAL; INFILTRATION; INTRACAUDAL; PERINEURAL AS NEEDED
Status: DISCONTINUED | OUTPATIENT
Start: 2021-07-14 | End: 2021-07-14

## 2021-07-14 RX ADMIN — EPHEDRINE SULFATE 20 MG: 50 INJECTION, SOLUTION INTRAVENOUS at 12:40

## 2021-07-14 RX ADMIN — DEXAMETHASONE SODIUM PHOSPHATE 4 MG: 4 INJECTION INTRA-ARTICULAR; INTRALESIONAL; INTRAMUSCULAR; INTRAVENOUS; SOFT TISSUE at 12:40

## 2021-07-14 RX ADMIN — FENTANYL CITRATE 25 MCG: 50 INJECTION INTRAMUSCULAR; INTRAVENOUS at 14:36

## 2021-07-14 RX ADMIN — ONDANSETRON 4 MG: 2 INJECTION INTRAMUSCULAR; INTRAVENOUS at 12:40

## 2021-07-14 RX ADMIN — FENTANYL CITRATE 25 MCG: 50 INJECTION INTRAMUSCULAR; INTRAVENOUS at 14:22

## 2021-07-14 RX ADMIN — PROPOFOL 200 MG: 10 INJECTION, EMULSION INTRAVENOUS at 13:12

## 2021-07-14 RX ADMIN — SODIUM CHLORIDE 125 ML/HR: 0.9 INJECTION, SOLUTION INTRAVENOUS at 11:50

## 2021-07-14 RX ADMIN — FENTANYL CITRATE 25 MCG: 50 INJECTION INTRAMUSCULAR; INTRAVENOUS at 14:47

## 2021-07-14 RX ADMIN — FENTANYL CITRATE 100 MCG: 50 INJECTION INTRAMUSCULAR; INTRAVENOUS at 12:32

## 2021-07-14 RX ADMIN — CEFAZOLIN SODIUM 1000 MG: 1 SOLUTION INTRAVENOUS at 12:05

## 2021-07-14 RX ADMIN — FENTANYL CITRATE 25 MCG: 50 INJECTION INTRAMUSCULAR; INTRAVENOUS at 14:15

## 2021-07-14 RX ADMIN — ONDANSETRON 4 MG: 2 INJECTION INTRAMUSCULAR; INTRAVENOUS at 14:27

## 2021-07-14 RX ADMIN — LIDOCAINE HYDROCHLORIDE 100 MG: 20 INJECTION, SOLUTION EPIDURAL; INFILTRATION; INTRACAUDAL; PERINEURAL at 12:28

## 2021-07-14 RX ADMIN — PROPOFOL 200 MG: 10 INJECTION, EMULSION INTRAVENOUS at 12:28

## 2021-07-14 RX ADMIN — IBUPROFEN 600 MG: 600 TABLET, FILM COATED ORAL at 15:56

## 2021-07-14 RX ADMIN — MIDAZOLAM 2 MG: 1 INJECTION INTRAMUSCULAR; INTRAVENOUS at 12:21

## 2021-07-14 NOTE — INTERVAL H&P NOTE
H&P reviewed  After examining the patient I find no changes in the patients condition since the H&P had been written      Vitals:    07/14/21 1121   BP: 107/77   Pulse: 70   Resp: 16   Temp: 97 5 °F (36 4 °C)   SpO2: 98%

## 2021-07-14 NOTE — ANESTHESIA PREPROCEDURE EVALUATION
Procedure:  Cystocele Repair; possible fixation ligament sacrospinous (N/A Vagina )  INSERTION PUBOVAGINAL SLING (FEMALE) (N/A Vagina )  CYSTOSCOPY (N/A Bladder)    Relevant Problems   Other   (+) Disease of thyroid gland        Physical Exam    Airway    Mallampati score: I  TM Distance: <3 FB  Neck ROM: full     Dental       Cardiovascular  Rhythm: regular, Rate: normal, Cardiovascular exam normal    Pulmonary  Pulmonary exam normal     Other Findings        Anesthesia Plan  ASA Score- 2     Anesthesia Type- general with ASA Monitors  Additional Monitors:   Airway Plan: LMA  Plan Factors-Exercise tolerance (METS): >4 METS  Chart reviewed  Patient is a current smoker  Patient instructed to abstain from smoking on day of procedure  Patient smoked on day of surgery  Obstructive sleep apnea risk education given perioperatively  Induction- intravenous  Postoperative Plan-     Informed Consent- Anesthetic plan and risks discussed with patient

## 2021-07-15 ENCOUNTER — OFFICE VISIT (OUTPATIENT)
Dept: OBGYN CLINIC | Facility: CLINIC | Age: 56
End: 2021-07-15

## 2021-07-15 ENCOUNTER — TELEPHONE (OUTPATIENT)
Dept: OBGYN CLINIC | Facility: CLINIC | Age: 56
End: 2021-07-15

## 2021-07-15 VITALS — BODY MASS INDEX: 24.87 KG/M2 | DIASTOLIC BLOOD PRESSURE: 80 MMHG | SYSTOLIC BLOOD PRESSURE: 138 MMHG | WEIGHT: 138.2 LBS

## 2021-07-15 DIAGNOSIS — Z09 FOLLOW-UP EXAMINATION AFTER GYNECOLOGICAL SURGERY: Primary | ICD-10-CM

## 2021-07-15 PROCEDURE — 99024 POSTOP FOLLOW-UP VISIT: CPT | Performed by: OBSTETRICS & GYNECOLOGY

## 2021-07-15 NOTE — OP NOTE
OPERATIVE REPORT  PATIENT NAME: Jaci Cage    :  1965  MRN: [id number]  Pt Location: [facility] OR ROOM 05    SURGERY DATE: 2021    Surgeon(s) and Role:     * Sarah Cabrera MD - Primary    Preop Diagnosis:  Midline cystocele [N81 11]  Urethral hypermobility [N36 41]  Incomplete uterine prolapse [N81 2]    Post-Op Diagnosis Codes:     * Midline cystocele [N81 11]     * Urethral hypermobility [N36 41]     * Incomplete uterine prolapse [N81 2]    Procedure(s) (LRB):  Cystocele Repair, Sacrospinous Ligament fixation (N/A)  INSERTION PUBOVAGINAL SLING (FEMALE) (N/A)  CYSTOSCOPY (N/A)    Specimen(s):  * No specimens in log *    Estimated Blood Loss:   Minimal    Drains:  * No LDAs found *    Anesthesia Type:   General    Operative Indications:  Midline cystocele [N81 11]  Urethral hypermobility [N36 41]  Incomplete uterine prolapse [N81 2]      Operative Findings: Moderate size midline cystocele with significant urethral descensus  Incomplete uterine prolapse of a very small sized uterus  No adnexal masses on bimanual exam and no other abnormalities found    Complications:   None    Procedure and Technique:  Having identified the patient as Golden Samuel on the operating room table, the patient was placed under general anesthesia, prepped and draped usual sterile fashion, carefully positioned in the dorsal lithotomy position  After a time-out was obtained examination revealed the findings noted under the heading operative findings  A Sorto catheter was placed to identify the bladder trigone as well as drain the bladder for which 300 cc of clear yellow urine was obtained  Two Allis clamps were placed at the beginning of the cystocele closest to the urethra and a 2nd 1 placed close to the area close to the cervix  Dilute Marcaine solution was now used to hydro dissect the vaginal mucosa away from the bladder    A vertical midline incision was initiated between the 2 Allis clamps with Bovie cautery, and completed with Metzenbaum scissors dissection  After placement of Allis clamps on the vaginal incision the incision was extended both proximally and distally as far as allowable  Metzenbaum scissors dissection in addition to blunt fingertip dissection was used now to dissect the bladder away from the vaginal mucosal flaps  This was performed approximately from to o'clock sweeping clockwise over to 10:00 a m  until the cystocele was able to be fully reduced  Several rows of 3-0 Monocryl suture was now used to reduce the cystocele by taking advantage of the available vesicle vaginal fascia  At this point excess vaginal mucosa was trimmed with Cordoba scissors and the vaginal mucosal defect was now closed using running interlocking 2-0 Vicryl suture  Attention was now turned to the urethra where by an Allis clamp was placed 1 finger breath below the urethral meatus followed by a 2nd Allis clamp 1 finger breath below that 1  Again dilute Marcaine solution was used to hydro dissect the vaginal mucosa away from the perivesical areas  Sharp knife dissection were used to create a 1 cm incision between the Allis clamps  After grasping the vaginal mucosal flaps with the Allis clamps tenotomy scissors were used to further enlarge this opening as well as create channels from this opening bilaterally to an area just beneath the inferior aspect of the inferior pubic ramus  The Abbrevo TVT O kit was now opened and the needles mesh and wing guide were placed onto the operative field  The patient's right channel had the wing guide placed within it and the appropriate TVT O needle was placed along the wing guide until was felt to slide just underneath the inferior pubic ramus  The needle was turned so that it now becerra the  fascia and then was brought out through a stab incision within the groin    Palpation along the vaginal mucosa indicated no evidence of buttonholing and a similar procedure was then performed on the opposite side  At this point the Sorto catheter was removed and cystoscopy performed  Observation of the bladder showed no evidence of bleeding, a bubble near the dome of the bladder was identified  No evidence of mesh intrusion was noted and both ureteral orifices showed good ejection of urine bilaterally  The cystoscopy was then completed and the Sorto catheter replaced  A #28 urethral dilator was now placed between the urethra and the mesh to be used as a static spacer while tensioning was performed  After initial tensioning was performed the protective sleeves were removed from the Prolene stay sutures  Further tensioning was performed and the stay sutures in addition to the midline Prolene marker were now removed  The static spacer was removed and the vaginal flaps closed using running 2-0 Vicryl suture  At this in the procedure the uterus still seem to descend midway down the vaginal canal and decision was made to go ahead with the sacral spinous fixation of the uterine cervix  Two Allis clamps were placed at 4 and 8:00 a m  the 0 is a  Was used to hydro dissect the vaginal mucosa away from the underlying rectum and a vertical midline incision was incised in the posterior vaginal wall using Bovie cautery  Metzenbaum scissors dissection was used to further dissect the posterior vaginal wall  There dissection used bisected loose areolar tissue and the right coccygeus muscle was easily identified  The anchor sure instrument using a Prolene suture was now brought into play and placed within the muscle such that it was at least 1 finger breath medial from the ischial spine and within the middle portion of the muscle  Traction on the suture revealed good placement within the muscle and a free Cordoba needle was now placed on the distal end of 1 of the sutures    After dissecting the vaginal mucosa away from the posterior aspect of the cervix, a good bite within the portion of the cervix was placed and brought out and clamped for future usage  Two-0 Vicryl suture was now used to close the vaginal mucosa midway  Prior to full closure of the vaginal mucosa, the Prolene suture was tied down such that the cervix now was brought into good approximation of the right coccygeal muscle  After cutting the Prolene suture, the remainder of the posterior vaginal mucosa reapproximation was performed  At this point a gloved finger inspection of the rectum was performed demonstrating no suture intrusion of the rectum  Vaginal packing was now placed followed by closure of the groin incisions from the TVT O with skin glue  Sponge needle and instrument count at this point count  The patient was taken the PACU in satisfactory condition     I was present for the entire procedure and A qualified resident physician was not available    Patient Disposition:  PACU     SIGNATURE: Abimael Haas MD  DATE: July 15, 2021  TIME: 7:26 AM

## 2021-07-15 NOTE — TELEPHONE ENCOUNTER
Called patient for post op check per Dr Rik Vergara  Patient c/o up all night with pressure and pain  Per Dr Rik Vergara needs to have packing removed  Patient will come to office at 1300 today

## 2021-07-15 NOTE — PROGRESS NOTES
Patient is seen today for packing removal   She stated she had  Difficulty last night sleeping because of discomfort  Unfortunately, she did not take any type of pain medication whatsoever  She did get up several times to go to the bathroom  She is complaining of bilateral leg discomfort but without weakness or numbness  She also has some right buttock discomfort  The vaginal packing was removed without any incident  I encouraged the patient, who was accompanied by her daughter, to take Motrin and alternate with Tylenol for her pain  She was also advised to place an ice pack on the right buttock due to the fact that she did have a right sacral spinous fixation which can cause irritation and discomfort in that region  She was also informed that her bilateral leg pain is most likely due to positioning which should alleviate itself with the use of Motrin  The patient was encouraged to call with any questions or concerns and otherwise she will be seen back in approximately 2 weeks for her normally scheduled postoperative follow-up

## 2021-07-20 ENCOUNTER — TELEPHONE (OUTPATIENT)
Dept: OBGYN CLINIC | Facility: CLINIC | Age: 56
End: 2021-07-20

## 2021-07-20 NOTE — TELEPHONE ENCOUNTER
Patient called c/o still has post op buttock pain  Still not taking OTC pain meds regularly or daily  States no bleeding, no fever  Advised WNL to have some post op discomfort  Advised take Motrin regularly for pain relief, use ice packs as directed, increase fluids, walk around the house, then rest, observe for improvement  Patient has follow up appointment in 1 week

## 2021-07-27 ENCOUNTER — OFFICE VISIT (OUTPATIENT)
Dept: OBGYN CLINIC | Facility: CLINIC | Age: 56
End: 2021-07-27

## 2021-07-27 VITALS — BODY MASS INDEX: 24.66 KG/M2 | DIASTOLIC BLOOD PRESSURE: 68 MMHG | SYSTOLIC BLOOD PRESSURE: 106 MMHG | WEIGHT: 137 LBS

## 2021-07-27 DIAGNOSIS — Z09 FOLLOW-UP EXAMINATION AFTER GYNECOLOGICAL SURGERY: Primary | ICD-10-CM

## 2021-07-27 PROCEDURE — 99024 POSTOP FOLLOW-UP VISIT: CPT | Performed by: OBSTETRICS & GYNECOLOGY

## 2021-07-27 NOTE — PROGRESS NOTES
Tyrell Llanes is here today following an uneventful  TVT O procedure, cystocele repair, sacral spinous histerpexy  She is doing well and offers no complaints or concerns at this time  /68 (BP Location: Left arm, Patient Position: Sitting, Cuff Size: Adult)   Wt 62 1 kg (137 lb)   BMI 24 66 kg/m²   Review of Systems:  Skin: No rashes or discolorations of any concern  RESP: Denies SOB, no cough  CV: Denies chest pain or palpitations  GI: Denies abdominal pain, heartburn, nausea, vomiting, changes in bowel habits  : Denies dysuria, frequency, CVA tenderness, incontinence and hematuria  Genitalia: Denies abnormal vaginal discharge, external lesions, rashes, pelvic pain, pressure, abnormal bleeding  Rectal:  Denies pain, bleeding, hemorrhoids,    Her pelvic exam reveals incisions are healing well with no signs of disruption or infection  We reviewed the surgical findings and the pathology from the procedure  Recommendations are that the patient slowly increase her activity, but to continue to avoid heavy lifting, pushing or pulling as well as intercourse   Patient is to return in 2 weeks for final follow-up

## 2021-08-12 ENCOUNTER — OFFICE VISIT (OUTPATIENT)
Dept: OBGYN CLINIC | Facility: CLINIC | Age: 56
End: 2021-08-12

## 2021-08-12 VITALS — SYSTOLIC BLOOD PRESSURE: 112 MMHG | WEIGHT: 136.4 LBS | BODY MASS INDEX: 24.55 KG/M2 | DIASTOLIC BLOOD PRESSURE: 68 MMHG

## 2021-08-12 DIAGNOSIS — Z09 FOLLOW-UP EXAMINATION AFTER GYNECOLOGICAL SURGERY: Primary | ICD-10-CM

## 2021-08-12 DIAGNOSIS — Z12.31 ENCOUNTER FOR SCREENING MAMMOGRAM FOR MALIGNANT NEOPLASM OF BREAST: ICD-10-CM

## 2021-08-12 PROCEDURE — 99024 POSTOP FOLLOW-UP VISIT: CPT | Performed by: OBSTETRICS & GYNECOLOGY

## 2021-08-12 NOTE — PROGRESS NOTES
Tunde Bragg is here today following an uneventful pelvic reconstructive surgery  She is doing well and offers no complaints or concerns at this time  She feels much better than 2 weeks prior where she was having discomfort and she has noticed that her bladder function is greatly improving  /68 (BP Location: Left arm, Patient Position: Sitting, Cuff Size: Standard)   Wt 61 9 kg (136 lb 6 4 oz)   BMI 24 55 kg/m²   Review of Systems:  Skin: No rashes or discolorations of any concern  RESP: Denies SOB, no cough  CV: Denies chest pain or palpitations  GI: Denies abdominal pain, heartburn, nausea, vomiting, changes in bowel habits  : Denies dysuria, frequency, CVA tenderness, incontinence and hematuria  Genitalia: Denies abnormal vaginal discharge, external lesions, rashes, pelvic pain, pressure, abnormal bleeding  Rectal:  Denies pain, bleeding, hemorrhoids,    Her pelvic exam reveals her incisions are healing well with no signs of disruption or infection  Recommendations are  Return to all normal activities at this time   Patient is to return in 3 months for yearly visit  Patient requesting a mammogram referral at this time and 1 is given to her

## 2021-11-23 ENCOUNTER — ANNUAL EXAM (OUTPATIENT)
Dept: OBGYN CLINIC | Facility: CLINIC | Age: 56
End: 2021-11-23
Payer: COMMERCIAL

## 2021-11-23 VITALS
DIASTOLIC BLOOD PRESSURE: 70 MMHG | BODY MASS INDEX: 24.63 KG/M2 | HEIGHT: 63 IN | SYSTOLIC BLOOD PRESSURE: 118 MMHG | WEIGHT: 139 LBS

## 2021-11-23 DIAGNOSIS — Z12.31 ENCOUNTER FOR SCREENING MAMMOGRAM FOR MALIGNANT NEOPLASM OF BREAST: ICD-10-CM

## 2021-11-23 DIAGNOSIS — Z01.419 ENCOUNTER FOR GYNECOLOGICAL EXAMINATION WITHOUT ABNORMAL FINDING: Primary | ICD-10-CM

## 2021-11-23 DIAGNOSIS — N32.81 OVERACTIVE BLADDER: ICD-10-CM

## 2021-11-23 LAB
BACTERIA UR QL AUTO: ABNORMAL /HPF
BILIRUB UR QL STRIP: NEGATIVE
CLARITY UR: CLEAR
COLOR UR: YELLOW
GLUCOSE UR STRIP-MCNC: NEGATIVE MG/DL
HGB UR QL STRIP.AUTO: ABNORMAL
HYALINE CASTS #/AREA URNS LPF: ABNORMAL /LPF
KETONES UR STRIP-MCNC: NEGATIVE MG/DL
LEUKOCYTE ESTERASE UR QL STRIP: NEGATIVE
NITRITE UR QL STRIP: NEGATIVE
NON-SQ EPI CELLS URNS QL MICRO: ABNORMAL /HPF
PH UR STRIP.AUTO: 6 [PH]
PROT UR STRIP-MCNC: NEGATIVE MG/DL
RBC #/AREA URNS AUTO: ABNORMAL /HPF
SP GR UR STRIP.AUTO: 1.01 (ref 1–1.03)
UROBILINOGEN UR QL STRIP.AUTO: 0.2 E.U./DL
WBC #/AREA URNS AUTO: ABNORMAL /HPF

## 2021-11-23 PROCEDURE — 87086 URINE CULTURE/COLONY COUNT: CPT | Performed by: OBSTETRICS & GYNECOLOGY

## 2021-11-23 PROCEDURE — 81001 URINALYSIS AUTO W/SCOPE: CPT | Performed by: OBSTETRICS & GYNECOLOGY

## 2021-11-23 PROCEDURE — S0612 ANNUAL GYNECOLOGICAL EXAMINA: HCPCS | Performed by: OBSTETRICS & GYNECOLOGY

## 2021-11-24 LAB — BACTERIA UR CULT: ABNORMAL

## 2021-11-30 ENCOUNTER — TELEPHONE (OUTPATIENT)
Dept: OBGYN CLINIC | Facility: CLINIC | Age: 56
End: 2021-11-30

## 2021-11-30 DIAGNOSIS — R31.21 ASYMPTOMATIC MICROSCOPIC HEMATURIA: Primary | ICD-10-CM

## 2021-12-16 PROCEDURE — U0005 INFEC AGEN DETEC AMPLI PROBE: HCPCS | Performed by: FAMILY MEDICINE

## 2021-12-16 PROCEDURE — U0003 INFECTIOUS AGENT DETECTION BY NUCLEIC ACID (DNA OR RNA); SEVERE ACUTE RESPIRATORY SYNDROME CORONAVIRUS 2 (SARS-COV-2) (CORONAVIRUS DISEASE [COVID-19]), AMPLIFIED PROBE TECHNIQUE, MAKING USE OF HIGH THROUGHPUT TECHNOLOGIES AS DESCRIBED BY CMS-2020-01-R: HCPCS | Performed by: FAMILY MEDICINE

## 2021-12-23 PROCEDURE — U0005 INFEC AGEN DETEC AMPLI PROBE: HCPCS | Performed by: FAMILY MEDICINE

## 2021-12-23 PROCEDURE — U0003 INFECTIOUS AGENT DETECTION BY NUCLEIC ACID (DNA OR RNA); SEVERE ACUTE RESPIRATORY SYNDROME CORONAVIRUS 2 (SARS-COV-2) (CORONAVIRUS DISEASE [COVID-19]), AMPLIFIED PROBE TECHNIQUE, MAKING USE OF HIGH THROUGHPUT TECHNOLOGIES AS DESCRIBED BY CMS-2020-01-R: HCPCS | Performed by: FAMILY MEDICINE

## 2022-05-28 ENCOUNTER — APPOINTMENT (EMERGENCY)
Dept: CT IMAGING | Facility: HOSPITAL | Age: 57
End: 2022-05-28
Payer: COMMERCIAL

## 2022-05-28 ENCOUNTER — HOSPITAL ENCOUNTER (EMERGENCY)
Facility: HOSPITAL | Age: 57
Discharge: HOME | End: 2022-05-29
Attending: EMERGENCY MEDICINE | Admitting: EMERGENCY MEDICINE
Payer: COMMERCIAL

## 2022-05-28 ENCOUNTER — APPOINTMENT (EMERGENCY)
Dept: RADIOLOGY | Facility: HOSPITAL | Age: 57
End: 2022-05-28
Payer: COMMERCIAL

## 2022-05-28 DIAGNOSIS — M25.561 RIGHT KNEE PAIN: ICD-10-CM
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
## 2022-05-28 DIAGNOSIS — S60.419A ABRASION OF FINGER, INITIAL ENCOUNTER: ICD-10-CM

## 2022-05-28 DIAGNOSIS — S09.90XA INJURY OF HEAD, INITIAL ENCOUNTER: ICD-10-CM

## 2022-05-28 PROCEDURE — 72128 CT CHEST SPINE W/O DYE: CPT

## 2022-05-28 PROCEDURE — 99284 EMERGENCY DEPT VISIT MOD MDM: CPT

## 2022-05-28 PROCEDURE — 72131 CT LUMBAR SPINE W/O DYE: CPT

## 2022-05-28 PROCEDURE — 90471 IMMUNIZATION ADMIN: CPT

## 2022-05-28 PROCEDURE — 73564 X-RAY EXAM KNEE 4 OR MORE: CPT

## 2022-05-28 PROCEDURE — 99284 EMERGENCY DEPT VISIT MOD MDM: CPT | Performed by: EMERGENCY MEDICINE

## 2022-05-28 PROCEDURE — 70450 CT HEAD/BRAIN W/O DYE: CPT

## 2022-05-28 PROCEDURE — 72125 CT NECK SPINE W/O DYE: CPT

## 2022-05-28 RX ORDER — ACETAMINOPHEN 325 MG/1
650 TABLET ORAL ONCE
Status: COMPLETED | OUTPATIENT
Start: 2022-05-28 | End: 2022-05-29

## 2022-05-29 VITALS
OXYGEN SATURATION: 99 % | BODY MASS INDEX: 24.3 KG/M2 | HEART RATE: 68 BPM | RESPIRATION RATE: 19 BRPM | WEIGHT: 135 LBS | DIASTOLIC BLOOD PRESSURE: 77 MMHG | TEMPERATURE: 97.8 F | SYSTOLIC BLOOD PRESSURE: 120 MMHG

## 2022-05-29 PROCEDURE — 90715 TDAP VACCINE 7 YRS/> IM: CPT | Performed by: EMERGENCY MEDICINE

## 2022-05-29 RX ADMIN — ACETAMINOPHEN 650 MG: 325 TABLET, FILM COATED ORAL at 00:06

## 2022-05-29 RX ADMIN — TETANUS TOXOID, REDUCED DIPHTHERIA TOXOID AND ACELLULAR PERTUSSIS VACCINE, ADSORBED 0.5 ML: 5; 2.5; 8; 8; 2.5 SUSPENSION INTRAMUSCULAR at 00:07

## 2022-05-29 NOTE — ED PROVIDER NOTES
History  Chief Complaint   Patient presents with    Fall     Per EMS, mechanical trip and fall on uneven sidewalk  +headstrike, unknown LOC, GCS 15, negative blood thinners  Reports right sided head pain, c-collar in place as a precautionary measure  Patient also has abrasion to right hand and right knee pain   +ETOH tonight        History provided by:  Patient and relative   used: No    Fall  Mechanism of injury: fall    Injury location:  Head/neck, finger, torso and leg  Head/neck injury location:  Head  Finger injury location:  R little finger  Torso injury location:  Back  Leg injury location:  R knee  Incident location:  Outdoors  Time since incident:  30 minutes  Arrived directly from scene: yes    Fall:     Fall occurred:  Tripped and walking (uneven side walk)    Height of fall:  Same level    Impact surface:  Unable to specify    Point of impact:  Head and knees    Entrapped after fall: no    Protective equipment: none    Suspicion of alcohol use: yes    Suspicion of drug use: no    Tetanus status:  Unknown  Prior to arrival data:     Bystander interventions:  None    Patient ambulatory at scene: no      Blood loss:  None    Responsiveness at scene:  Alert    Orientation at scene:  Person, situation, time and place    Loss of consciousness: yes      Loss of consciousness duration:  2 minutes    Amnesic to event: no      Airway interventions:  None    Breathing interventions:  None    IV access status:  None    IO access:  None    Fluids administered:  None    Cardiac interventions:  None    Medications administered:  None    Immobilization:  C-collar    Airway condition since incident:  Stable    Breathing condition since incident:  Stable    Circulation condition since incident:  Stable    Mental status condition since incident:  Stable    Disability condition since incident:  Stable  Associated symptoms: back pain, headaches and loss of consciousness    Associated symptoms: no abdominal pain, no chest pain, no difficulty breathing, no nausea, no neck pain and no vomiting    Headaches:     Severity:  Mild    Onset quality:  Gradual    Duration: 30 minutes  Timing:  Constant    Progression:  Unchanged    Chronicity:  New  Risk factors: no anticoagulation therapy        Prior to Admission Medications   Prescriptions Last Dose Informant Patient Reported? Taking? Magnesium 500 MG CAPS   Yes Yes   Sig: Take by mouth daily at bedtime   Multiple Vitamins-Minerals (ALIVE MULTI-VITAMIN PO)   Yes Yes   Sig: Take by mouth daily at bedtime   Omega-3 Fatty Acids (Fish Oil) 1200 MG CAPS   Yes Yes   Sig: Take by mouth daily at bedtime   TURMERIC PO   Yes Yes   Sig: Take by mouth daily at bedtime   ergocalciferol (VITAMIN D2) 50,000 units   No Yes   Sig: TAKE 1 CAPSULE BY MOUTH ONE TIME PER WEEK   levothyroxine 100 mcg tablet   Yes Yes   Sig: Take 100 mcg by mouth daily      Facility-Administered Medications: None       Past Medical History:   Diagnosis Date    Colon polyp     Disease of thyroid gland     Urinary incontinence        Past Surgical History:   Procedure Laterality Date    COLONOSCOPY      LASER ABLATION OF THE CERVIX  2008    DC ANTERIOR COLPORRAPHY RPR CYSTOCELE W/CYSTO N/A 7/14/2021    Procedure: Cystocele Repair, Sacrospinous Ligament fixation;  Surgeon: Gil Saavedra MD;  [facility];  Service: Gynecology    DC CYSTOURETHROSCOPY N/A 7/14/2021    Procedure: Skip Meth;  Surgeon: Gil Saavedra MD;  [facility];  Service: Gynecology    DC SLING OPER STRES INCONTINENCE N/A 7/14/2021    Procedure: INSERTION PUBOVAGINAL SLING (FEMALE); Surgeon: Gil Saavedra MD;  [facility];  Service: Gynecology    THYROIDECTOMY         History reviewed  No pertinent family history  I have reviewed and agree with the history as documented      E-Cigarette/Vaping    E-Cigarette Use Never User      E-Cigarette/Vaping Substances    Nicotine No     THC No     CBD No  Flavoring No     Other No     Unknown No      Social History     Tobacco Use    Smoking status: Current Every Day Smoker     Packs/day: 0 50     Types: Cigarettes    Smokeless tobacco: Never Used   Vaping Use    Vaping Use: Never used   Substance Use Topics    Alcohol use: Yes     Comment: occasionally    Drug use: Not Currently       Review of Systems   Constitutional: Negative for chills and fever  HENT: Negative for facial swelling, sore throat and trouble swallowing  Eyes: Negative for pain and visual disturbance  Respiratory: Negative for cough, chest tightness and shortness of breath  Cardiovascular: Negative for chest pain and leg swelling  Gastrointestinal: Negative for abdominal pain, blood in stool, diarrhea, nausea and vomiting  Genitourinary: Negative for dysuria and flank pain  Musculoskeletal: Positive for back pain  Negative for neck pain and neck stiffness  Right knee pain and swelling   Skin: Negative for pallor and rash  Allergic/Immunologic: Negative for environmental allergies and immunocompromised state  Neurological: Positive for loss of consciousness and headaches  Negative for dizziness  Hematological: Negative for adenopathy  Does not bruise/bleed easily  Psychiatric/Behavioral: Negative for agitation and behavioral problems  All other systems reviewed and are negative  Physical Exam  Physical Exam  Vitals and nursing note reviewed  Constitutional:       General: She is not in acute distress  Appearance: She is well-developed  HENT:      Head: Normocephalic and atraumatic  Eyes:      Extraocular Movements: Extraocular movements intact  Cardiovascular:      Rate and Rhythm: Normal rate and regular rhythm  Pulmonary:      Effort: Pulmonary effort is normal  No respiratory distress  Abdominal:      Palpations: Abdomen is soft  Tenderness: There is no abdominal tenderness  There is no guarding or rebound     Musculoskeletal: Cervical back: Normal range of motion and neck supple  Comments: Superficial abrasion to right 5th finger, no deformity, tendon function intact, cap refill less than 3 seconds    Right knee:  Swelling over medial aspect of the right knee, no deformity, able to slowly range actively, neurovascular intact distally   Skin:     General: Skin is warm and dry  Neurological:      General: No focal deficit present  Mental Status: She is alert and oriented to person, place, and time  Psychiatric:         Mood and Affect: Mood normal          Behavior: Behavior normal          Vital Signs  ED Triage Vitals [05/28/22 2307]   Temperature Pulse Respirations Blood Pressure SpO2   97 8 °F (36 6 °C) 67 18 137/92 98 %      Temp Source Heart Rate Source Patient Position - Orthostatic VS BP Location FiO2 (%)   Oral Monitor Lying Right arm --      Pain Score       8           Vitals:    05/29/22 0015 05/29/22 0030 05/29/22 0100 05/29/22 0138   BP: 142/83   120/77   Pulse: 66 62 62 68   Patient Position - Orthostatic VS:             Visual Acuity  Visual Acuity    Flowsheet Row Most Recent Value   L Pupil Size (mm) 3   R Pupil Size (mm) 3          ED Medications  Medications   tetanus-diphtheria-acellular pertussis (BOOSTRIX) IM injection 0 5 mL (0 5 mL Intramuscular Given 5/29/22 0007)   acetaminophen (TYLENOL) tablet 650 mg (650 mg Oral Given 5/29/22 0006)       Diagnostic Studies  Results Reviewed     None                 CT head without contrast   Final Result by Geneva Munoz MD (05/29 0645)      No acute intracranial process  No skull fracture  Findings are consistent with the preliminary report from [facility] which was provided shortly after completion of the exam                      Workstation performed: [id number]         CT cervical spine without contrast   Final Result by Geneva Munoz MD (05/29 1682)      No cervical spine fracture or traumatic malalignment  Findings are consistent with the preliminary report from [facility] which was provided shortly after completion of the exam                 Workstation performed: [id number]         CT spine thoracic & lumbar wo contrast   Final Result by Nate Brown MD (05/29 0831)      No acute fracture or posttraumatic malalignment  Findings are consistent with the preliminary report from [facility] which was provided shortly after completion of the exam          Workstation performed: [id number]         XR knee 4+ views Right injury   Final Result by Domingo Gallagher MD (05/29 0951)      No acute osseous abnormality  Workstation performed: [id number]                    Procedures  Procedures         ED Course  ED Course as of 05/29/22 1610   Sun May 29, 2022   0016 XR Right knee reviewed, no displaced fracture or dislocation noted  0125 CT results reviewed ([facility]), no significant acute abnormality on CT head, cervical spine, thoracic and lumbar spine  0125 Patient able to put weight on right leg, ambulated in room without support, does not want cane, Daughter says she has one at home  We will give Ortho info in case right knee pain gest worse  SBIRT 20yo+    Flowsheet Row Most Recent Value   SBIRT (23 yo +)    In order to provide better care to our patients, we are screening all of our patients for alcohol and drug use  Would it be okay to ask you these screening questions? Yes Filed at: 05/28/2022 2317   Initial Alcohol Screen: US AUDIT-C     1  How often do you have a drink containing alcohol? 0 Filed at: 05/28/2022 2317   2  How many drinks containing alcohol do you have on a typical day you are drinking? 0 Filed at: 05/28/2022 2317   3a  Male UNDER 65: How often do you have five or more drinks on one occasion? 0 Filed at: 05/28/2022 2317   3b  FEMALE Any Age, or MALE 65+: How often do you have 4 or more drinks on one occassion? 0 Filed at: 05/28/2022 2317   Audit-C Score 0 Filed at: 05/28/2022 2317   RIANNA: How many times in the past year have you    Used an illegal drug or used a prescription medication for non-medical reasons? Never Filed at: 05/28/2022 2317                    MDM  Number of Diagnoses or Management Options  Abrasion of finger, initial encounter  Fall, initial encounter: new and requires workup  Injury of head, initial encounter: new and requires workup  Right knee pain: new and requires workup  Diagnosis management comments: Patient is a 27-year-old female, brought in by EMS for evaluation after a fall, patient had an alcoholic drink earlier, fell down on uneven sidewalk, wet grass, was blacked out for about 2 minutes according to family member, complains of right forehead pain, right facial pain, superficial abrasion to right 5th finger, pain, swelling and abrasion to right knee  On exam patient is conscious, alert, vital signs stable, no acute distress, C-collar in place, no swelling or deformity of head or facial area noted, no trismus or drooling, no jaw malocclusion, no C-spine tenderness, there is midthoracic and lumbar spine tenderness without any deformity or step-off; no chest wall tenderness, abdomen soft nontender, stable pelvis, superficial abrasion to the right 5th finger, swelling of the medial aspect of the right knee, no deformity  Differential diagnosis:  Mechanical fall, possible mild alcohol intoxication, patient is clinically sober, will get CT head, C-spine, T-spine, L-spine, x-ray right knee, give Tylenol, Boostrix         Amount and/or Complexity of Data Reviewed  Tests in the radiology section of CPT®: ordered and reviewed  Independent visualization of images, tracings, or specimens: yes        Disposition  Final diagnoses:   Fall, initial encounter   Injury of head, initial encounter   Abrasion of finger, initial encounter   Right knee pain     Time reflects when diagnosis was documented in both MDM as applicable and the Disposition within this note     Time User Action Codes Description Comment    5/28/2022 11:34 PM [place] Mower Add [W09  MGWD] Fall, initial encounter     5/28/2022 11:34 PM [place] M[place] Add [S30 [id number]] Injury of head, initial encounter     5/28/2022 11:34 PM [place], [address] Abrasion of finger, initial encounter     5/28/2022 11:34 PM [place] Mower Add [[id number]] Right knee pain       ED Disposition     ED Disposition   Discharge    Condition   Stable    Date/Time   Sun May 29, 2022  1:36 AM    Comment   Dino Obrien discharge to home/self care  Follow-up Information     Follow up With Specialties Details Why Contact Info Additional Information    Garth Newby MD Family Medicine Schedule an appointment as soon as possible for a visit  As needed [address] [place] [address] [id number]       [address] Orthopedic Surgery  As needed [address] [facility]  Jose Luis [address] [facility] [id number]  [address] [place] [address] [address] [place] [address], [address] [place] [address], [id number] [phone]          Discharge Medication List as of 5/29/2022  1:37 AM      CONTINUE these medications which have NOT CHANGED    Details   ergocalciferol (VITAMIN D2) 50,000 units TAKE 1 CAPSULE BY MOUTH ONE TIME PER WEEK, Normal      levothyroxine 100 mcg tablet Take 100 mcg by mouth daily, Starting Wed 10/7/2020, Historical Med      Magnesium 500 MG CAPS Take by mouth daily at bedtime, Historical Med      Multiple Vitamins-Minerals (ALIVE MULTI-VITAMIN PO) Take by mouth daily at bedtime, Historical Med      Omega-3 Fatty Acids (Fish Oil) 1200 MG CAPS Take by mouth daily at bedtime, Historical Med      TURMERIC PO Take by mouth daily at bedtime, Historical Med             No discharge procedures on file      PDMP Review       Value Time User    PDMP Reviewed  Yes 7/14/2021  2:10 PM Iván Sosa MD ED Provider  Electronically Signed by           Heidi Patrick MD  05/29/22 2812

## 2022-08-22 PROBLEM — J38.02 BILATERAL VOCAL FOLD PARESIS: Status: ACTIVE | Noted: 2022-08-22

## 2022-08-22 PROBLEM — J38.1 REINKE'S EDEMA OF VOCAL FOLDS: Status: ACTIVE | Noted: 2022-08-22

## 2022-08-22 PROBLEM — K90.41 GLUTEN INTOLERANCE: Status: ACTIVE | Noted: 2022-08-22

## 2022-08-22 PROBLEM — J34.89 NASAL OBSTRUCTION: Status: ACTIVE | Noted: 2022-08-22

## 2022-08-22 PROBLEM — R49.0 DYSPHONIA: Status: ACTIVE | Noted: 2022-08-22

## 2022-08-22 PROBLEM — E89.0 HX OF TOTAL THYROIDECTOMY: Status: ACTIVE | Noted: 2022-08-22

## 2022-08-22 PROBLEM — J04.0 REFLUX LARYNGITIS: Status: ACTIVE | Noted: 2022-08-22

## 2022-08-22 PROBLEM — R49.0 MUSCLE TENSION DYSPHONIA: Status: ACTIVE | Noted: 2022-08-22

## 2022-08-22 PROBLEM — K21.9 GASTROESOPHAGEAL REFLUX DISEASE: Status: ACTIVE | Noted: 2022-08-22

## 2022-08-22 PROBLEM — R13.14 PHARYNGOESOPHAGEAL DYSPHAGIA: Status: ACTIVE | Noted: 2022-08-22

## 2022-08-22 PROBLEM — K21.9 REFLUX LARYNGITIS: Status: ACTIVE | Noted: 2022-08-22

## 2022-10-07 ENCOUNTER — APPOINTMENT (EMERGENCY)
Dept: CT IMAGING | Facility: HOSPITAL | Age: 57
End: 2022-10-07
Payer: COMMERCIAL

## 2022-10-07 ENCOUNTER — APPOINTMENT (OUTPATIENT)
Dept: RADIOLOGY | Facility: HOSPITAL | Age: 57
End: 2022-10-07
Payer: COMMERCIAL

## 2022-10-07 ENCOUNTER — HOSPITAL ENCOUNTER (EMERGENCY)
Facility: HOSPITAL | Age: 57
Discharge: HOME | End: 2022-10-07
Attending: EMERGENCY MEDICINE
Payer: COMMERCIAL

## 2022-10-07 VITALS
HEART RATE: 63 BPM | SYSTOLIC BLOOD PRESSURE: 120 MMHG | RESPIRATION RATE: 18 BRPM | TEMPERATURE: 98.7 F | OXYGEN SATURATION: 97 % | DIASTOLIC BLOOD PRESSURE: 72 MMHG

## 2022-10-07 DIAGNOSIS — R31.29 MICROSCOPIC HEMATURIA: ICD-10-CM

## 2022-10-07 DIAGNOSIS — I31.39 PERICARDIAL EFFUSION: ICD-10-CM

## 2022-10-07 DIAGNOSIS — K76.9 HEPATIC LESION: ICD-10-CM

## 2022-10-07 DIAGNOSIS — K52.9 COLITIS: Primary | ICD-10-CM

## 2022-10-07 DIAGNOSIS — R10.13 EPIGASTRIC ABDOMINAL PAIN: ICD-10-CM

## 2022-10-07 LAB
ALBUMIN SERPL BCP-MCNC: 3.5 G/DL (ref 3.5–5)
ALP SERPL-CCNC: 72 U/L (ref 46–116)
ALT SERPL W P-5'-P-CCNC: 17 U/L (ref 12–78)
ANION GAP SERPL CALCULATED.3IONS-SCNC: 4 MMOL/L (ref 4–13)
AST SERPL W P-5'-P-CCNC: 27 U/L (ref 5–45)
ATRIAL RATE: 61 BPM
BACTERIA UR QL AUTO: ABNORMAL /HPF
BASOPHILS # BLD AUTO: 0.04 THOUSANDS/ΜL (ref 0–0.1)
BASOPHILS NFR BLD AUTO: 1 % (ref 0–1)
BILIRUB SERPL-MCNC: 0.23 MG/DL (ref 0.2–1)
BILIRUB UR QL STRIP: NEGATIVE
BUN SERPL-MCNC: 13 MG/DL (ref 5–25)
CALCIUM SERPL-MCNC: 9.6 MG/DL (ref 8.3–10.1)
CARDIAC TROPONIN I PNL SERPL HS: 4 NG/L
CHLORIDE SERPL-SCNC: 106 MMOL/L (ref 96–108)
CLARITY UR: CLEAR
CLARITY, POC: CLEAR
CO2 SERPL-SCNC: 31 MMOL/L (ref 21–32)
COLOR UR: YELLOW
COLOR, POC: YELLOW
CREAT SERPL-MCNC: 0.74 MG/DL (ref 0.6–1.3)
EOSINOPHIL # BLD AUTO: 0.22 THOUSAND/ΜL (ref 0–0.61)
EOSINOPHIL NFR BLD AUTO: 3 % (ref 0–6)
ERYTHROCYTE [DISTWIDTH] IN BLOOD BY AUTOMATED COUNT: 14.6 % (ref 11.6–15.1)
EXT BILIRUBIN, UA: NEGATIVE
EXT BLOOD URINE: NORMAL
EXT GLUCOSE, UA: NEGATIVE
EXT KETONES: NEGATIVE
GFR SERPL CREATININE-BSD FRML MDRD: 89 ML/MIN/1.73SQ M
GLUCOSE SERPL-MCNC: 91 MG/DL (ref 65–140)
GLUCOSE UR STRIP-MCNC: NEGATIVE MG/DL
HCT VFR BLD AUTO: 43.8 % (ref 34.8–46.1)
HGB BLD-MCNC: 14.3 G/DL (ref 11.5–15.4)
HGB UR QL STRIP.AUTO: ABNORMAL
IMM GRANULOCYTES # BLD AUTO: 0.03 THOUSAND/UL (ref 0–0.2)
IMM GRANULOCYTES NFR BLD AUTO: 0 % (ref 0–2)
KETONES UR STRIP-MCNC: NEGATIVE MG/DL
LEUKOCYTE ESTERASE UR QL STRIP: NEGATIVE
LIPASE SERPL-CCNC: 171 U/L (ref 73–393)
LYMPHOCYTES # BLD AUTO: 3.13 THOUSANDS/ΜL (ref 0.6–4.47)
LYMPHOCYTES NFR BLD AUTO: 37 % (ref 14–44)
MCH RBC QN AUTO: 29.4 PG (ref 26.8–34.3)
MCHC RBC AUTO-ENTMCNC: 32.6 G/DL (ref 31.4–37.4)
MCV RBC AUTO: 90 FL (ref 82–98)
MONOCYTES # BLD AUTO: 0.55 THOUSAND/ΜL (ref 0.17–1.22)
MONOCYTES NFR BLD AUTO: 7 % (ref 4–12)
NEUTROPHILS # BLD AUTO: 4.48 THOUSANDS/ΜL (ref 1.85–7.62)
NEUTS SEG NFR BLD AUTO: 52 % (ref 43–75)
NITRITE UR QL STRIP: NEGATIVE
NON-SQ EPI CELLS URNS QL MICRO: ABNORMAL /HPF
NRBC BLD AUTO-RTO: 0 /100 WBCS
P AXIS: 78 DEGREES
PH UR STRIP.AUTO: 7 [PH] (ref 4.5–8)
PLATELET # BLD AUTO: 225 THOUSANDS/UL (ref 149–390)
PMV BLD AUTO: 11.7 FL (ref 8.9–12.7)
POTASSIUM SERPL-SCNC: 4 MMOL/L (ref 3.5–5.3)
PR INTERVAL: 172 MS
PROT SERPL-MCNC: 7.4 G/DL (ref 6.4–8.4)
PROT UR STRIP-MCNC: NEGATIVE MG/DL
QRS AXIS: 66 DEGREES
QRSD INTERVAL: 66 MS
QT INTERVAL: 408 MS
QTC INTERVAL: 410 MS
RBC # BLD AUTO: 4.87 MILLION/UL (ref 3.81–5.12)
RBC #/AREA URNS AUTO: ABNORMAL /HPF
SODIUM SERPL-SCNC: 141 MMOL/L (ref 135–147)
SP GR UR STRIP.AUTO: 1.01 (ref 1–1.03)
T WAVE AXIS: 79 DEGREES
UROBILINOGEN UR QL STRIP.AUTO: 0.2 E.U./DL
VENTRICULAR RATE: 61 BPM
WBC # BLD AUTO: 8.45 THOUSAND/UL (ref 4.31–10.16)
WBC #/AREA URNS AUTO: ABNORMAL /HPF

## 2022-10-07 PROCEDURE — 71045 X-RAY EXAM CHEST 1 VIEW: CPT

## 2022-10-07 PROCEDURE — 85025 COMPLETE CBC W/AUTO DIFF WBC: CPT | Performed by: PHYSICIAN ASSISTANT

## 2022-10-07 PROCEDURE — 99285 EMERGENCY DEPT VISIT HI MDM: CPT

## 2022-10-07 PROCEDURE — 93005 ELECTROCARDIOGRAM TRACING: CPT

## 2022-10-07 PROCEDURE — 80053 COMPREHEN METABOLIC PANEL: CPT | Performed by: PHYSICIAN ASSISTANT

## 2022-10-07 PROCEDURE — 96374 THER/PROPH/DIAG INJ IV PUSH: CPT

## 2022-10-07 PROCEDURE — 74177 CT ABD & PELVIS W/CONTRAST: CPT

## 2022-10-07 PROCEDURE — 96361 HYDRATE IV INFUSION ADD-ON: CPT

## 2022-10-07 PROCEDURE — 99285 EMERGENCY DEPT VISIT HI MDM: CPT | Performed by: PHYSICIAN ASSISTANT

## 2022-10-07 PROCEDURE — 84484 ASSAY OF TROPONIN QUANT: CPT | Performed by: PHYSICIAN ASSISTANT

## 2022-10-07 PROCEDURE — 36415 COLL VENOUS BLD VENIPUNCTURE: CPT | Performed by: PHYSICIAN ASSISTANT

## 2022-10-07 PROCEDURE — 93010 ELECTROCARDIOGRAM REPORT: CPT | Performed by: INTERNAL MEDICINE

## 2022-10-07 PROCEDURE — G1004 CDSM NDSC: HCPCS

## 2022-10-07 PROCEDURE — 81001 URINALYSIS AUTO W/SCOPE: CPT

## 2022-10-07 PROCEDURE — 83690 ASSAY OF LIPASE: CPT | Performed by: PHYSICIAN ASSISTANT

## 2022-10-07 RX ORDER — ONDANSETRON 2 MG/ML
4 INJECTION INTRAMUSCULAR; INTRAVENOUS ONCE
Status: DISCONTINUED | OUTPATIENT
Start: 2022-10-07 | End: 2022-10-07 | Stop reason: HOSPADM

## 2022-10-07 RX ORDER — KETOROLAC TROMETHAMINE 30 MG/ML
15 INJECTION, SOLUTION INTRAMUSCULAR; INTRAVENOUS ONCE
Status: COMPLETED | OUTPATIENT
Start: 2022-10-07 | End: 2022-10-07

## 2022-10-07 RX ADMIN — KETOROLAC TROMETHAMINE 15 MG: 30 INJECTION, SOLUTION INTRAMUSCULAR; INTRAVENOUS at 03:20

## 2022-10-07 RX ADMIN — SODIUM CHLORIDE 1000 ML: 0.9 INJECTION, SOLUTION INTRAVENOUS at 03:20

## 2022-10-07 RX ADMIN — IOHEXOL 100 ML: 350 INJECTION, SOLUTION INTRAVENOUS at 04:11

## 2022-10-07 NOTE — ED PROVIDER NOTES
History  Chief Complaint   Patient presents with    Epigastric Pain     Pt arrives to ED via EMS from home c/o upper back pain that radiated bilaterally into epigastric area  Also c/o upper jaw pain  Took Gas-X approx 1 hour  Denies N/V/D, SOB  Maribel Scott is a 61 y o   female with PMH of hypothyroidism and reflux who presents to the emergency department with epigastric abdominal pain  Patient states she went to bed at approximately 10:30 p m  And shortly thereafter started with epigastric abdominal pain radiating to the right upper quadrant and back  States this was gradual onset and worsening  She states she took some Gas-X approximately 1 hour prior to arrival which took approximately 30 minutes to work and at this point her symptoms are improved/just about resolved  Nothing she did seem to make symptoms worse  She denies any associated nausea, vomiting, diarrhea  She denies any urinary complaints including frequency, urgency, dysuria or hematuria  Denies any vaginal bleeding or vaginal discharge  She denies any radiation of pain into the chest and denies any shortness of breath, palpitations, dizziness, lightheadedness, syncope or diaphoresis  Denies any fevers or chills or previous abdominal surgeries, last oral intake was at dinner  Patient does complain of some bilateral jaw pain and points to the TMJ  States that when she was not pain she was clenching her teeth  Seems to be better when jaw at rest- no claudication however  No pain into the neck and no numbness or tingling into the face  History provided by:  Patient   used: No    Epigastric Pain  Pain location:  Epigastric  Pain quality: sharp and stabbing    Radiates to: Right upper quadrant and back    Pain radiates to the back: yes    Pain severity:  Moderate  Onset quality:  Gradual  Duration:  4 hours  Timing:  Constant  Progression:  Resolved  Chronicity:  New  Context: not breathing, no drug use, not eating, no intercourse, not lifting, no movement, not raising an arm, not at rest, no stress and no trauma    Relieved by: On its own  Worsened by:  Nothing tried  Ineffective treatments:  None tried  Associated symptoms: abdominal pain    Associated symptoms: no altered mental status, no anorexia, no anxiety, no back pain, no claudication, no cough, no diaphoresis, no dizziness, no dysphagia, no fatigue, no fever, no headache, no heartburn, no lower extremity edema, no nausea, no near-syncope, no numbness, no orthopnea, no palpitations, no shortness of breath, no syncope, not vomiting and no weakness        Prior to Admission Medications   Prescriptions Last Dose Informant Patient Reported? Taking?    Magnesium 500 MG CAPS   Yes No   Sig: Take by mouth daily at bedtime   Multiple Vitamins-Minerals (ALIVE MULTI-VITAMIN PO)   Yes No   Sig: Take by mouth daily at bedtime   Omega-3 Fatty Acids (Fish Oil) 1200 MG CAPS   Yes No   Sig: Take by mouth daily at bedtime   TURMERIC PO   Yes No   Sig: Take by mouth daily at bedtime   cyclobenzaprine (FLEXERIL) 5 mg tablet   No No   Sig: Take 1 tablet (5 mg total) by mouth 3 (three) times a day as needed for muscle spasms for up to 10 days   ergocalciferol (VITAMIN D2) 50,000 units   No No   Sig: TAKE 1 CAPSULE BY MOUTH ONE TIME PER WEEK   levothyroxine 100 mcg tablet   Yes No   Sig: Take 100 mcg by mouth daily   naproxen (NAPROSYN) 500 mg tablet   No No   Sig: Take 1 tablet (500 mg total) by mouth 2 (two) times a day with meals   nicotine (NICODERM CQ) 14 mg/24hr TD 24 hr patch   No No   Sig: Place 1 patch on the skin every 24 hours   nicotine (NICODERM CQ) 7 mg/24hr TD 24 hr patch   No No   Sig: Place 1 patch on the skin every 24 hours for 14 days Start this dose following completion of 14mg dose   triamcinolone (KENALOG) 0 5 % cream   No No   Sig: Apply topically 3 (three) times a day      Facility-Administered Medications: None       Past Medical History:   Diagnosis Date    Colon polyp     Disease of thyroid gland     Urinary incontinence        Past Surgical History:   Procedure Laterality Date    COLONOSCOPY      LASER ABLATION OF THE CERVIX  2008    FL ANTERIOR COLPORRAPHY RPR CYSTOCELE W/CYSTO N/A 7/14/2021    Procedure: Cystocele Repair, Sacrospinous Ligament fixation;  Surgeon: Cong Garcia MD;  [facility];  Service: Gynecology    FL CYSTOURETHROSCOPY N/A 7/14/2021    Procedure: Kat Lime;  Surgeon: Cong Garcia MD;  [facility];  Service: Gynecology    FL SLING OPER STRES INCONTINENCE N/A 7/14/2021    Procedure: INSERTION PUBOVAGINAL SLING (FEMALE); Surgeon: Cong Garcia MD;  [facility];  Service: Gynecology    THYROIDECTOMY         History reviewed  No pertinent family history  I have reviewed and agree with the history as documented  E-Cigarette/Vaping    E-Cigarette Use Never User      E-Cigarette/Vaping Substances    Nicotine No     THC No     CBD No     Flavoring No     Other No     Unknown No      Social History     Tobacco Use    Smoking status: Current Every Day Smoker     Packs/day: 0 50     Types: Cigarettes    Smokeless tobacco: Never Used   Vaping Use    Vaping Use: Never used   Substance Use Topics    Alcohol use: Yes     Comment: occasionally    Drug use: Not Currently       Review of Systems   Constitutional: Negative for activity change, appetite change, chills, diaphoresis, fatigue, fever and unexpected weight change  HENT: Negative for congestion, dental problem, ear pain, mouth sores, nosebleeds, sinus pressure, sinus pain, sneezing, sore throat and trouble swallowing  Respiratory: Negative for apnea, cough, choking, chest tightness, shortness of breath, wheezing and stridor  Cardiovascular: Negative for chest pain, palpitations, orthopnea, claudication, leg swelling, syncope and near-syncope  Gastrointestinal: Positive for abdominal pain   Negative for anorexia, constipation, diarrhea, heartburn, nausea and vomiting  Genitourinary: Negative for dysuria, flank pain, frequency and urgency  Musculoskeletal: Negative for arthralgias, back pain, joint swelling and myalgias  Skin: Negative for color change, pallor and rash  Neurological: Negative for dizziness, tremors, seizures, syncope, speech difficulty, weakness, light-headedness, numbness and headaches  All other systems reviewed and are negative  Physical Exam  Physical Exam  Vitals and nursing note reviewed  Constitutional:       General: She is not in acute distress  Appearance: Normal appearance  She is normal weight  She is not ill-appearing or toxic-appearing  HENT:      Head: Normocephalic and atraumatic  Mouth/Throat:      Mouth: Mucous membranes are moist       Pharynx: Oropharynx is clear  Eyes:      Extraocular Movements: Extraocular movements intact  Pupils: Pupils are equal, round, and reactive to light  Cardiovascular:      Rate and Rhythm: Normal rate and regular rhythm  Pulses: Normal pulses  Heart sounds: Normal heart sounds  No murmur heard  No friction rub  No gallop  Comments: Regular rate rhythm, no murmurs rubs or gallops  Pulmonary:      Effort: Pulmonary effort is normal  No respiratory distress  Breath sounds: Normal breath sounds  No stridor  No wheezing, rhonchi or rales  Abdominal:      General: Abdomen is flat  Bowel sounds are normal  There is no distension  Palpations: Abdomen is soft  There is no mass  Tenderness: There is no abdominal tenderness  There is no guarding or rebound  Hernia: No hernia is present  Comments: Abdomen soft nontender nondistended   Musculoskeletal:         General: No swelling, tenderness, deformity or signs of injury  Normal range of motion  Cervical back: Normal range of motion  No rigidity or tenderness  Right lower leg: No edema  Left lower leg: No edema     Skin:     General: Skin is warm and dry  Capillary Refill: Capillary refill takes less than 2 seconds  Neurological:      General: No focal deficit present  Mental Status: She is alert and oriented to person, place, and time  Mental status is at baseline  Cranial Nerves: No cranial nerve deficit  Sensory: No sensory deficit  Motor: No weakness        Coordination: Coordination normal          Vital Signs  ED Triage Vitals   Temperature Pulse Respirations Blood Pressure SpO2   10/07/22 0424 10/07/22 0232 10/07/22 0232 10/07/22 0232 10/07/22 0232   98 7 °F (37 1 °C) 69 18 134/99 99 %      Temp Source Heart Rate Source Patient Position - Orthostatic VS BP Location FiO2 (%)   10/07/22 0539 10/07/22 0232 10/07/22 0232 10/07/22 0232 --   Oral Monitor Sitting Right arm       Pain Score       --                  Vitals:    10/07/22 0232 10/07/22 0437 10/07/22 0507 10/07/22 0539   BP: 134/99 113/78 106/73 120/72   Pulse: 69 70 58 63   Patient Position - Orthostatic VS: Sitting Lying Lying          Visual Acuity      ED Medications  Medications   ondansetron (ZOFRAN) injection 4 mg (4 mg Intravenous Not Given 10/7/22 0320)   ketorolac (TORADOL) injection 15 mg (15 mg Intravenous Given 10/7/22 0320)   sodium chloride 0 9 % bolus 1,000 mL (0 mL Intravenous Stopped 10/7/22 0542)   iohexol (OMNIPAQUE) 350 MG/ML injection (MULTI-DOSE) 100 mL (100 mL Intravenous Given 10/7/22 0411)       Diagnostic Studies  Results Reviewed     Procedure Component Value Units Date/Time    Urine Microscopic [[id number]]  (Abnormal) Collected: 10/07/22 0416    Lab Status: Final result Specimen: Urine, Clean Catch Updated: 10/07/22 0530     RBC, UA 4-10 /hpf      WBC, UA 0-1 /hpf      Epithelial Cells Occasional /hpf      Bacteria, UA None Seen /hpf     POCT urinalysis dipstick [[id number]]  (Normal) Resulted: 10/07/22 0429    Lab Status: Final result Updated: 10/07/22 0429     Color, UA yellow     Clarity, UA clear     Glucose, UA (Ref: Negative) negative     Bilirubin, UA (Ref: Negative) negative     Ketones, UA (Ref: Negative) negative     Blood, UA (Ref: Negative) small    Urine Macroscopic, POC [[id number]]  (Abnormal) Collected: 10/07/22 0416    Lab Status: Final result Specimen: Urine Updated: 10/07/22 0418     Color, UA Yellow     Clarity, UA Clear     pH, UA 7 0     Leukocytes, UA Negative     Nitrite, UA Negative     Protein, UA Negative mg/dl      Glucose, UA Negative mg/dl      Ketones, UA Negative mg/dl      Urobilinogen, UA 0 2 E U /dl      Bilirubin, UA Negative     Occult Blood, UA Small     Specific [place], UA 1 015    Narrative:      CLINITEK RESULT    HS Troponin 0hr (reflex protocol) [[id number]]  (Normal) Collected: 10/07/22 0309    Lab Status: Final result Specimen: Blood from Arm, Left Updated: 10/07/22 0346     hs TnI 0hr 4 ng/L     Comprehensive metabolic panel [[id number]] Collected: 10/07/22 0309    Lab Status: Final result Specimen: Blood from Arm, Left Updated: 10/07/22 0343     Sodium 141 mmol/L      Potassium 4 0 mmol/L      Chloride 106 mmol/L      CO2 31 mmol/L      ANION GAP 4 mmol/L      BUN 13 mg/dL      Creatinine 0 74 mg/dL      Glucose 91 mg/dL      Calcium 9 6 mg/dL      AST 27 U/L      ALT 17 U/L      Alkaline Phosphatase 72 U/L      Total Protein 7 4 g/dL      Albumin 3 5 g/dL      Total Bilirubin 0 23 mg/dL      eGFR 89 ml/min/1 73sq m     Narrative:      [facility] guidelines for Chronic Kidney Disease (CKD):     Stage 1 with normal or high GFR (GFR > 90 mL/min/1 73 square meters)    Stage 2 Mild CKD (GFR = 60-89 mL/min/1 73 square meters)    Stage 3A Moderate CKD (GFR = 45-59 mL/min/1 73 square meters)    Stage 3B Moderate CKD (GFR = 30-44 mL/min/1 73 square meters)    Stage 4 Severe CKD (GFR = 15-29 mL/min/1 73 square meters)    Stage 5 End Stage CKD (GFR <15 mL/min/1 73 square meters)  Note: GFR calculation is accurate only with a steady state creatinine    Lipase [[id number]]  (Normal) Collected: 10/07/22 0309    Lab Status: Final result Specimen: Blood from Arm, Left Updated: 10/07/22 0343     Lipase 171 u/L     CBC and differential [[id number]] Collected: 10/07/22 0309    Lab Status: Final result Specimen: Blood from Arm, Left Updated: 10/07/22 0319     WBC 8 45 Thousand/uL      RBC 4 87 Million/uL      Hemoglobin 14 3 g/dL      Hematocrit 43 8 %      MCV 90 fL      MCH 29 4 pg      MCHC 32 6 g/dL      RDW 14 6 %      MPV 11 7 fL      Platelets 981 Thousands/uL      nRBC 0 /100 WBCs      Neutrophils Relative 52 %      Immat GRANS % 0 %      Lymphocytes Relative 37 %      Monocytes Relative 7 %      Eosinophils Relative 3 %      Basophils Relative 1 %      Neutrophils Absolute 4 48 Thousands/µL      Immature Grans Absolute 0 03 Thousand/uL      Lymphocytes Absolute 3 13 Thousands/µL      Monocytes Absolute 0 55 Thousand/µL      Eosinophils Absolute 0 22 Thousand/µL      Basophils Absolute 0 04 Thousands/µL                  CT abdomen pelvis with contrast   Final Result by Milad Varghese DO (10/07 0124)      Thickening of the cecum and liquid stool within the colon  Findings likely represent cecal colitis  Follow-up with gastroenterology is recommended  The study was marked in [facility] for immediate notification  Workstation performed: [id number]         XR chest 1 view portable   ED Interpretation by Barbara Gale PA-C (10/07 1865)   No infiltrate, cardiac silhouette normal, no pleural effusions or pulmonary edema                      Procedures  ECG 12 Lead Documentation Only    Date/Time: 10/7/2022 3:49 AM  Performed by: Barbara Gale PA-C  Authorized by: Barbara Gale PA-C     Indications / Diagnosis:  Epigastric Pain   ECG reviewed by me, the ED Provider: yes    Patient location:  ED  Previous ECG:     Previous ECG:  Compared to current    Similarity:  No change    Comparison to cardiac monitor: No    Interpretation:     Interpretation: normal    Rate:     ECG rate:  61 ECG rate assessment: normal    Rhythm:     Rhythm: sinus rhythm    Ectopy:     Ectopy: none    QRS:     QRS axis:  Normal    QRS intervals:  Normal  Conduction:     Conduction: normal    ST segments:     ST segments:  Normal  T waves:     T waves: normal               ED Course                                             MDM  Number of Diagnoses or Management Options  Colitis: new and requires workup  Epigastric abdominal pain: new and requires workup  Hepatic lesion: new and requires workup  Microscopic hematuria: new and requires workup  Pericardial effusion: new and requires workup  Diagnosis management comments: Patient was seen and examined  in the emergency department for chief complaint of epigastric abdominal pain radiating to the right upper quadrant back  The patient presented with symptoms that started approximately 3-4 hours prior to arrival   Gradually increasing epigastric pain sharp stabbing in the epigastrium radiating to the back of right upper quadrant  No nausea vomiting diarrhea  No fevers or chills  No previous abdominal surgeries  Took some Gas-X and did not seem to work however approximate 1 hour after Gas-X, just prior to arrival symptoms have all improved/resolved  No radiation to the chest   No shortness of breath palpitations dizziness lightheadedness or diaphoresis  No urinary complaints or vaginal complaints  On exam patient is well-appearing in no acute distress  Lungs are clear  Regular rate local nose rubs or gallops  Abdomen soft, nontender, nondistended  No rebound or guarding  No rashes or lesions      DDx including but not limited to: appendicitis, gastroenteritis, gastritis, PUD, GERD, gastroparesis, hepatitis, pancreatitis, colitis, enteritis, diverticulitis, food poisoning, mesenteric adenitis, epiploic appendagitis, mesenteric panniculitis, mesenteric ischemia, IBD, IBS, ileus, bowel obstruction, volvulus, internal hernia, AAA, cholecystitis, biliary colic, choledocholithiasis, perforated viscus, tumor, splenic etiology, constipation, pelvic pathology, renal colic, pyelonephritis, UTI; doubt cardiac etiology  Workup: Will check labs including EKG and troponin, chest x-ray, CT abdomen pelvis with  Pain control with Toradol and Zofran  Gentle fluid bolus  Reassessment  Jaw pain is consistent with TMJ  Patient does give a history of clenching her jaw while she was in pain  Tenderness is at the temporomandibular joint and does not radiate into the neck  Sensation is intact in the face  At this point do not suspect vascular etiology  Labs reassuring  Evidence of microscopic hematuria  Asymptomatic without signs of infection  Will recommend follow-up with Urology- discussed this is the finding and need for follow-up  Discussed his on patient's chart for follow-up  CT abdomen pelvis reveals pericardial effusion  Patient is asymptomatic this time point  No dyspnea, no chest pain  Incidental, possibly physiologic  Discussed need for return if symptoms to occur  Will send referral to Cardiology for follow-up  CT abdomen pelvis also reveals hepatic lesions  Discussed follow-up with GI     CT abdomen pelvis also reveals colitis  Pain the patient is asymptomatic at this point not has not had diarrheal illness  Will recommend symptomatic care at home with bowl bland diet  Discussed disease course in need for follow-up with GI  Patient understands  Disposition:  General impression 65-year-old female presents with epigastric abdominal pain found have colitis  Multiple incidental findings noted and discussed and patient understand she needs to follow-up  Return precautions and PCP follow-up discussed  The patient was discharged in stable condition  Patient ambulated off the department  Extensive return to emergency department precautions were discussed    Follow up with appropriate providers including primary care physician was discussed  Patient and/or their  primary decision maker expressed understanding  Patient remained stable during entire emergency department stay  Amount and/or Complexity of Data Reviewed  Clinical lab tests: ordered and reviewed  Tests in the radiology section of CPT®: ordered and reviewed  Tests in the medicine section of CPT®: ordered and reviewed  Review and summarize past medical records: yes  Independent visualization of images, tracings, or specimens: yes    Risk of Complications, Morbidity, and/or Mortality  Presenting problems: moderate  Diagnostic procedures: moderate  Management options: moderate    Patient Progress  Patient progress: stable      Disposition  Final diagnoses:   Epigastric abdominal pain   Colitis   Pericardial effusion   Hepatic lesion - One or more subcentimeter sharply circumscribed low-density hepatic lesion(s) are noted, too small to accurately characterize, but statistically most likely to represent subcentimeter hepatic cysts  Microscopic hematuria     Time reflects when diagnosis was documented in both MDM as applicable and the Disposition within this note     Time User Action Codes Description Comment    10/7/2022  5:30 AM Hurman No Add [R10 13] Epigastric abdominal pain     10/7/2022  5:30 AM Hurman No Add [K52 9] Colitis     10/7/2022  5:30 AM Hurman No Modify [R10 13] Epigastric abdominal pain     10/7/2022  5:30 AM Hurman No Modify [K52 9] Colitis     10/7/2022  5:30 AM Hurman No Add [I31 39] Pericardial effusion     10/7/2022  5:31 AM Hurman No Add [K76 9] Hepatic lesion     10/7/2022  5:31 AM Hurman No Modify [K76 9] Hepatic lesion One or more subcentimeter sharply circumscribed low-density hepatic lesion(s) are noted, too small to accurately characterize, but statistically most likely to represent subcentimeter hepatic cysts      10/7/2022  5:32 AM Hurman No Add [R31 29] Microscopic hematuria       ED Disposition ED Disposition   Discharge    Condition   Stable    Date/Time   Fri Oct 7, 2022  5:35 AM    Comment   Indy Ibanez discharge to home/self care                 Follow-up Information     Follow up With Specialties Details Why [address] [facility] Emergency Department Emergency Medicine Go to  As needed, If symptoms worsen [facility] [address]  [address] [facility] Emergency Department, [address] [facility] [address]  , [facility], [place], [facility] [address]  Call  To schedule an appointment with a primary care physician [phone]       Derrick Molina MD Family Medicine Schedule an appointment as soon as possible for a visit  For follow-up of your symptoms [address] [facility] [address] [facility] [phone]       [facility]ology Schedule an appointment as soon as possible for a visit today For follow-up of symptoms [address]  [address] [facility] [address]  Tashi [place] [facility], [address], [address], [facility], [place], [address] [phone]    [address] [facility] Cardiology Schedule an appointment as soon as possible for a visit  For follow-up of pericardial effusion [facility] [id number]  [address], [address] [facility], [place], [place], [address] [phone]    [facility] Urology Go in 2 weeks For follow-up of microscopic hematuria [facility] [address]  [address] [phone]  [address]  [facility] [place], [address], [place], [place], [address] [phone]          Patient's Medications   Discharge Prescriptions    No medications on file           PDMP Review       Value Time User    PDMP Reviewed  Yes 7/14/2021  2:10 PM Frankey Rhymes, MD          ED Provider  Electronically Signed by           Perry Soto PA-C  10/07/22 3880

## 2022-10-07 NOTE — DISCHARGE INSTRUCTIONS
You were seen in the emergency department today for epigastric abdominal pain  Laboratory analysis and EKG did not reveal acute abnormalities and Radiologic imaging revealed evidence of colitis, pericardial effusion  Urinalysis revealed microscopic hematuria, follow-up Urology  Please follow-up with your primary care physician as well as Gastroenterology regarding her symptoms  Return to the Emergency Department sooner if increased pain, fever, vomiting, diarrhea, difficulty breathing or urinating, bleeding  Clear fluids for 1-2 days and then advance diet as tolerated  Return with any chest pain shortness of breath palpitations dizziness lightheadedness

## 2022-10-07 NOTE — ED NOTES
Patient transported to [facility] 4527, [address] [facility]  10/07/22 9460

## 2022-12-04 ENCOUNTER — OFFICE VISIT (OUTPATIENT)
Dept: URGENT CARE | Age: 57
End: 2022-12-04

## 2022-12-04 VITALS
HEART RATE: 80 BPM | OXYGEN SATURATION: 97 % | BODY MASS INDEX: 24.75 KG/M2 | WEIGHT: 145 LBS | HEIGHT: 64 IN | RESPIRATION RATE: 18 BRPM | TEMPERATURE: 97.9 F

## 2022-12-04 DIAGNOSIS — J06.9 VIRAL URI: Primary | ICD-10-CM

## 2022-12-04 DIAGNOSIS — R05.1 ACUTE COUGH: ICD-10-CM

## 2022-12-04 RX ORDER — BENZONATATE 200 MG/1
200 CAPSULE ORAL 3 TIMES DAILY PRN
Qty: 20 CAPSULE | Refills: 0 | Status: SHIPPED | OUTPATIENT
Start: 2022-12-04

## 2022-12-04 NOTE — PATIENT INSTRUCTIONS
Symptoms consistent with viral URI  No signs of bacterial infection on exam today  COVID & Flu testing collected today - results in next 24 to 48 hours  Tessalon for cough as needed  OTC medications and supportive care as directed  Follow up with PCP in 3-5 days  Proceed to ER if symptoms worsen  IF PATIENT COVID RESULTS ARE POSITIVE, please continue home quarantine and contact patient primary care provider as soon as possible to inform of results and to discuss need for any further evaluation / recommendations / treatment options  Return to work / school / regular activities per school / work protocols or patient's PCP's instructions or recommendations  If patient does not have a primary care provider, you may call the [address] [place] [address] for questions and possible arrangement of PCP evaluation  [phone]  Please note - if you have COVID, acute recovery may take up to 4 weeks  Prolonged recovery may take several months or longer based on your age, comorbidities, severity of illness and vaccine status  As with any respiratory illness, transmission precautions are strongly advised  Masking  Isolating  Hand washing  Frequent cleaning of common use surfaces  Symptomatic treatment as needed  If sore throat:  Warm saltwater gargles every 1-2 hours while awake  Tylenol (or ibuprofen if allowed) as needed for any sore throat, fever, body aches and pains  If sinus pain / pressure:  Nasal saline spray may be helpful  Use every 2-3 hours while awake  Breathing in steamy air from shower and blowing nose to clear maybe helpful and can be done throughout day for comfort  Cold or warm compresses to head for comfort  Decongestant (if not contraindicated) may be helpful  Tylenol or Ibuprofen (if not contraindicated) may be helpful  Expectorant to keep mucous thin may also be helpful         PLEASE NOTE:  Yellow or green mucous does not necessarily mean a bacterial infection  Nasal drainage, congestion and / or cough typically peak around 7-10 days and then slowly resolve over next few weeks  (unless COVID, Influenza or RSV which can last longer)  You may use over the counter cough / cold medication as directed  This provider likes Mucinex D 12 hour formula - 1/2 to 1 tablet twice a day with full glass of fluid for daytime symptom relief (DO NOT TAKE IF YOU HAVE HIGH BLOOD PRESSURE  May take Coricidin HP and / or use plain Mucinex  If cough:  Cough drops, throat lozenges as needed  Vaporizer by the bedside  Warm tea with honey  May use nighttime cough medicine to help with sleep if needed -  Robitussin DM, Delsym or the like  Cough suppressants may cause drowsiness so recommend home use only  Please note that having a cough is not necessarily a bad thing  It's often your body's protective mechanism to help keep airways clear  If headache:  Tylenol (or Ibuprofen if allowed)  Cold compresses to head for comfort as needed  Rest   Fluids  If earache:  Tylenol (or Ibuprofen if allowed)  Warm compresses over ear(s) for comfort as needed  OTC nasal spray such as Flonase may be helpful  Rest   Fluids  If having fever or chills:  Tylenol (or Ibuprofen if allowed)  Recommend no work or outside activities  Rest   Fluids  (If you have a fever, it  typically lasts  approximately 3-5 days (unless influenza or COVID  Fever may last longer)  If diarrhea:  Clear liquids  You may want to avoid any milk products, fried - greasy foods, and / or spicy foods  If you are passing bloody diarrhea, seek further medical care  As a rule, we do not recommend using anti-diarrhea aids for acute diarrhea conditions  If significant worsening of your symptoms (ie  profound weakness, chest pain, shortness of breath, worst headache of your life, persistent vomiting), proceed to ER or call 911 for further evaluation       Follow up with your PCP if not improving over next 5-7 days  Retesting may be warranted if negative Covid test and recommended by your PCP

## 2022-12-04 NOTE — PROGRESS NOTES
[id number] [facility]        NAME: Lul Herndon is a 61 y o  female  : 1965    MRN: [id number]  DATE: 2022  TIME: 4:47 PM    Assessment and Plan   Viral URI [J06 9]  1  Viral URI        2  Acute cough  Covid/Flu-Office Collect    benzonatate (TESSALON) 200 MG capsule            Patient Instructions     COVID & Flu testing collected today  No signs of bacterial infection on exam   OTC meds & supportive care as directed  Follow up with PCP in 2-3 days  Proceed to ER if symptoms worsen  Chief Complaint     Chief Complaint   Patient presents with   • Generalized Body Aches     Body aches, cough, headache, fatigue began yesterday         History of Present Illness     61 y o  F presents with complaints of cough, body aches, headache and fatigue x 1 day  Denies CP, SOB, N/V/D, fevers, chills, sinus pressure, earache  States L ear hurts at times  Review of Systems   Review of Systems   Constitutional: Positive for fatigue  Negative for chills and fever  HENT: Negative for congestion, ear pain, facial swelling, hearing loss, rhinorrhea, sinus pressure, sneezing, sore throat and trouble swallowing  Eyes: Negative for pain, redness and visual disturbance  Respiratory: Positive for cough  Negative for chest tightness, shortness of breath and wheezing  Cardiovascular: Negative for chest pain and palpitations  Gastrointestinal: Negative for abdominal pain, diarrhea, nausea and vomiting  Genitourinary: Negative for dysuria, flank pain, hematuria and pelvic pain  Musculoskeletal: Positive for myalgias  Negative for arthralgias and back pain  Skin: Negative for color change and rash  Neurological: Positive for headaches  Negative for dizziness, seizures, syncope, weakness and light-headedness  Psychiatric/Behavioral: Negative for confusion, hallucinations and sleep disturbance  The patient is not nervous/anxious  All other systems reviewed and are negative          Current Medications       Current Outpatient Medications:   •  benzonatate (TESSALON) 200 MG capsule, Take 1 capsule (200 mg total) by mouth 3 (three) times a day as needed for cough, Disp: 20 capsule, Rfl: 0  •  levothyroxine 100 mcg tablet, Take 100 mcg by mouth daily, Disp: , Rfl:   •  Magnesium 500 MG CAPS, Take by mouth daily at bedtime, Disp: , Rfl:   •  Multiple Vitamins-Minerals (ALIVE MULTI-VITAMIN PO), Take by mouth daily at bedtime, Disp: , Rfl:   •  TURMERIC PO, Take by mouth daily at bedtime, Disp: , Rfl:   •  cyclobenzaprine (FLEXERIL) 5 mg tablet, Take 1 tablet (5 mg total) by mouth 3 (three) times a day as needed for muscle spasms for up to 10 days, Disp: 30 tablet, Rfl: 0  •  ergocalciferol (VITAMIN D2) 50,000 units, TAKE 1 CAPSULE BY MOUTH ONE TIME PER WEEK (Patient not taking: Reported on 12/4/2022), Disp: 12 capsule, Rfl: 1  •  naproxen (NAPROSYN) 500 mg tablet, Take 1 tablet (500 mg total) by mouth 2 (two) times a day with meals (Patient not taking: Reported on 12/4/2022), Disp: 30 tablet, Rfl: 0  •  nicotine (NICODERM CQ) 14 mg/24hr TD 24 hr patch, Place 1 patch on the skin every 24 hours, Disp: 42 patch, Rfl: 0  •  nicotine (NICODERM CQ) 7 mg/24hr TD 24 hr patch, Place 1 patch on the skin every 24 hours for 14 days Start this dose following completion of 14mg dose, Disp: 28 patch, Rfl: 0  •  Omega-3 Fatty Acids (Fish Oil) 1200 MG CAPS, Take by mouth daily at bedtime (Patient not taking: Reported on 12/4/2022), Disp: , Rfl:   •  triamcinolone (KENALOG) 0 5 % cream, Apply topically 3 (three) times a day (Patient not taking: Reported on 12/4/2022), Disp: 90 g, Rfl: 1    Current Allergies     Allergies as of 12/04/2022 - Reviewed 12/04/2022   Allergen Reaction Noted   • Morphine Palpitations and Other (See Comments) 08/08/2019            The following portions of the patient's history were reviewed and updated as appropriate: allergies, current medications, past family history, past medical history, past social history, past surgical history and problem list      Past Medical History:   Diagnosis Date   • Colon polyp    • Disease of thyroid gland    • Urinary incontinence        Past Surgical History:   Procedure Laterality Date   • COLONOSCOPY     • LASER ABLATION OF THE CERVIX  2008   • HI ANTERIOR COLPORRAPHY RPR CYSTOCELE W/CYSTO N/A 7/14/2021    Procedure: Cystocele Repair, Sacrospinous Ligament fixation;  Surgeon: Dia Jon MD;  Location: [facility];  Service: Gynecology   • HI CYSTOURETHROSCOPY N/A 7/14/2021    Procedure: CYSTOSCOPY;  Surgeon: Dia Jon MD;  Location: [facility];  Service: Gynecology   • HI SLING OPER STRES INCONTINENCE N/A 7/14/2021    Procedure: INSERTION PUBOVAGINAL SLING (FEMALE); Surgeon: Dia Jon MD;  Location: [facility];  Service: Gynecology   • THYROIDECTOMY         History reviewed  No pertinent family history  Medications have been verified  Objective   Pulse 80   Temp 97 9 °F (36 6 °C)   Resp 18   Ht 5' 4" (1 626 m)   Wt 65 8 kg (145 lb)   SpO2 97%   BMI 24 89 kg/m²   No LMP recorded  Patient is postmenopausal        Physical Exam     Physical Exam  Vitals reviewed  Constitutional:       General: She is not in acute distress  Appearance: Normal appearance  She is not toxic-appearing  HENT:      Head: Normocephalic  Right Ear: Tympanic membrane normal  No middle ear effusion  Tympanic membrane is not erythematous or bulging  Left Ear: Tympanic membrane normal  Tympanic membrane is not erythematous or bulging  Ears:      Comments:   No erythema or bulging  TM intact     Nose: No congestion or rhinorrhea  Right Sinus: No maxillary sinus tenderness or frontal sinus tenderness  Left Sinus: No maxillary sinus tenderness or frontal sinus tenderness  Mouth/Throat:      Pharynx: Uvula midline  No oropharyngeal exudate, posterior oropharyngeal erythema or uvula swelling        Tonsils: No tonsillar exudate or tonsillar abscesses  Eyes:      Extraocular Movements: Extraocular movements intact  Conjunctiva/sclera: Conjunctivae normal       Pupils: Pupils are equal, round, and reactive to light  Cardiovascular:      Rate and Rhythm: Normal rate and regular rhythm  Pulses: Normal pulses  Heart sounds: Normal heart sounds  Pulmonary:      Effort: Pulmonary effort is normal  No tachypnea, accessory muscle usage or respiratory distress  Breath sounds: Normal breath sounds and air entry  No decreased breath sounds, wheezing, rhonchi or rales  Comments: CTAB  Abdominal:      General: Bowel sounds are normal       Palpations: Abdomen is soft  Tenderness: There is no abdominal tenderness  There is no right CVA tenderness or guarding  Musculoskeletal:         General: Normal range of motion  Cervical back: Normal range of motion  Lymphadenopathy:      Cervical: No cervical adenopathy  Skin:     General: Skin is warm and dry  Neurological:      General: No focal deficit present  Mental Status: She is alert  Cranial Nerves: Cranial nerves are intact  Sensory: Sensation is intact  Motor: Motor function is intact  Coordination: Coordination is intact  Gait: Gait is intact  Deep Tendon Reflexes: Reflexes are normal and symmetric

## 2022-12-05 LAB
FLUAV RNA RESP QL NAA+PROBE: NEGATIVE
FLUBV RNA RESP QL NAA+PROBE: NEGATIVE
SARS-COV-2 RNA RESP QL NAA+PROBE: NEGATIVE

## 2023-03-15 ENCOUNTER — HOSPITAL ENCOUNTER (EMERGENCY)
Facility: HOSPITAL | Age: 58
Discharge: HOME | End: 2023-03-15
Attending: INTERNAL MEDICINE

## 2023-03-15 ENCOUNTER — APPOINTMENT (EMERGENCY)
Dept: CT IMAGING | Facility: HOSPITAL | Age: 58
End: 2023-03-15

## 2023-03-15 VITALS
OXYGEN SATURATION: 98 % | BODY MASS INDEX: 24.61 KG/M2 | SYSTOLIC BLOOD PRESSURE: 131 MMHG | RESPIRATION RATE: 20 BRPM | WEIGHT: 143.4 LBS | DIASTOLIC BLOOD PRESSURE: 96 MMHG | TEMPERATURE: 97.5 F | HEART RATE: 75 BPM

## 2023-03-15 DIAGNOSIS — R10.10 ACUTE UPPER ABDOMINAL PAIN: Primary | ICD-10-CM

## 2023-03-15 LAB
ALBUMIN SERPL BCP-MCNC: 4.3 G/DL (ref 3.5–5)
ALP SERPL-CCNC: 54 U/L (ref 34–104)
ALT SERPL W P-5'-P-CCNC: 10 U/L (ref 7–52)
ANION GAP SERPL CALCULATED.3IONS-SCNC: 7 MMOL/L (ref 4–13)
AST SERPL W P-5'-P-CCNC: 22 U/L (ref 13–39)
ATRIAL RATE: 62 BPM
BACTERIA UR QL AUTO: NORMAL /HPF
BASOPHILS # BLD AUTO: 0.04 THOUSANDS/ÂΜL (ref 0–0.1)
BASOPHILS NFR BLD AUTO: 1 % (ref 0–1)
BILIRUB SERPL-MCNC: 0.36 MG/DL (ref 0.2–1)
BILIRUB UR QL STRIP: NEGATIVE
BUN SERPL-MCNC: 14 MG/DL (ref 5–25)
CALCIUM SERPL-MCNC: 9.8 MG/DL (ref 8.4–10.2)
CARDIAC TROPONIN I PNL SERPL HS: 3 NG/L
CHLORIDE SERPL-SCNC: 102 MMOL/L (ref 96–108)
CLARITY UR: CLEAR
CO2 SERPL-SCNC: 30 MMOL/L (ref 21–32)
COLOR UR: ABNORMAL
CREAT SERPL-MCNC: 0.72 MG/DL (ref 0.6–1.3)
EOSINOPHIL # BLD AUTO: 0.1 THOUSAND/ÂΜL (ref 0–0.61)
EOSINOPHIL NFR BLD AUTO: 2 % (ref 0–6)
ERYTHROCYTE [DISTWIDTH] IN BLOOD BY AUTOMATED COUNT: 14.6 % (ref 11.6–15.1)
GFR SERPL CREATININE-BSD FRML MDRD: 92 ML/MIN/1.73SQ M
GLUCOSE SERPL-MCNC: 92 MG/DL (ref 65–140)
GLUCOSE UR STRIP-MCNC: NEGATIVE MG/DL
HCT VFR BLD AUTO: 41.3 % (ref 34.8–46.1)
HGB BLD-MCNC: 13.1 G/DL (ref 11.5–15.4)
HGB UR QL STRIP.AUTO: 50
IMM GRANULOCYTES # BLD AUTO: 0.02 THOUSAND/UL (ref 0–0.2)
IMM GRANULOCYTES NFR BLD AUTO: 0 % (ref 0–2)
KETONES UR STRIP-MCNC: NEGATIVE MG/DL
LEUKOCYTE ESTERASE UR QL STRIP: 25
LIPASE SERPL-CCNC: 51 U/L (ref 11–82)
LYMPHOCYTES # BLD AUTO: 2.13 THOUSANDS/ÂΜL (ref 0.6–4.47)
LYMPHOCYTES NFR BLD AUTO: 34 % (ref 14–44)
MCH RBC QN AUTO: 28.5 PG (ref 26.8–34.3)
MCHC RBC AUTO-ENTMCNC: 31.7 G/DL (ref 31.4–37.4)
MCV RBC AUTO: 90 FL (ref 82–98)
MONOCYTES # BLD AUTO: 0.46 THOUSAND/ÂΜL (ref 0.17–1.22)
MONOCYTES NFR BLD AUTO: 7 % (ref 4–12)
NEUTROPHILS # BLD AUTO: 3.53 THOUSANDS/ÂΜL (ref 1.85–7.62)
NEUTS SEG NFR BLD AUTO: 56 % (ref 43–75)
NITRITE UR QL STRIP: NEGATIVE
NON-SQ EPI CELLS URNS QL MICRO: NORMAL /HPF
NRBC BLD AUTO-RTO: 0 /100 WBCS
P AXIS: 66 DEGREES
PH UR STRIP.AUTO: 7 [PH]
PLATELET # BLD AUTO: 215 THOUSANDS/UL (ref 149–390)
PMV BLD AUTO: 10.7 FL (ref 8.9–12.7)
POTASSIUM SERPL-SCNC: 4.1 MMOL/L (ref 3.5–5.3)
PR INTERVAL: 178 MS
PROT SERPL-MCNC: 7.1 G/DL (ref 6.4–8.4)
PROT UR STRIP-MCNC: NEGATIVE MG/DL
QRS AXIS: 54 DEGREES
QRSD INTERVAL: 78 MS
QT INTERVAL: 420 MS
QTC INTERVAL: 426 MS
RBC # BLD AUTO: 4.6 MILLION/UL (ref 3.81–5.12)
RBC #/AREA URNS AUTO: NORMAL /HPF
SODIUM SERPL-SCNC: 139 MMOL/L (ref 135–147)
SP GR UR STRIP.AUTO: 1.01 (ref 1–1.04)
T WAVE AXIS: 72 DEGREES
UROBILINOGEN UA: NEGATIVE MG/DL
VENTRICULAR RATE: 62 BPM
WBC # BLD AUTO: 6.28 THOUSAND/UL (ref 4.31–10.16)
WBC #/AREA URNS AUTO: NORMAL /HPF

## 2023-03-15 RX ORDER — ALUMINUM HYDROXIDE, MAGNESIUM HYDROXIDE, SIMETHICONE 400; 400; 40 MG/10ML; MG/10ML; MG/10ML
15 SUSPENSION ORAL
Qty: 150 ML | Refills: 0 | Status: SHIPPED | OUTPATIENT
Start: 2023-03-15

## 2023-03-15 RX ORDER — KETOROLAC TROMETHAMINE 30 MG/ML
15 INJECTION, SOLUTION INTRAMUSCULAR; INTRAVENOUS ONCE
Status: COMPLETED | OUTPATIENT
Start: 2023-03-15 | End: 2023-03-15

## 2023-03-15 RX ADMIN — IOHEXOL 100 ML: 350 INJECTION, SOLUTION INTRAVENOUS at 19:22

## 2023-03-15 RX ADMIN — KETOROLAC TROMETHAMINE 15 MG: 30 INJECTION, SOLUTION INTRAMUSCULAR; INTRAVENOUS at 18:42

## 2023-03-15 RX ADMIN — SODIUM CHLORIDE 1000 ML: 0.9 INJECTION, SOLUTION INTRAVENOUS at 18:40

## 2023-03-15 NOTE — ED PROVIDER NOTES
History  Chief Complaint   Patient presents with   • Abdominal Cramping     Hx of diverticulitis, around 1pm started with severe pain and pale-ness according to boss  Denies nausea, vomiting  States lots of gas, took gas-x and alcaseltzer      76-year-old female past medical history of diverticulitis, hypothyroidism presents to emergency department complaining of abdominal pain  Atraumatic  LUQ pain starting at 10:30 AM after eating, boss said she looked very pale  Abdominal Cramping  Pain location:  LUQ and epigastric  Pain quality: bloating and cramping    Pain radiates to:  Does not radiate  Context: eating    Context: not sick contacts and not trauma    Relieved by:  Nothing  Worsened by:  Palpation (coffee )  Ineffective treatments:  None tried  Associated symptoms: belching and constipation (reports she did not have her normal bowel movement today, last bowel movement yesterday )    Associated symptoms: no anorexia, no chest pain, no chills, no cough, no diarrhea, no dysuria, no fatigue, no fever, no hematemesis, no hematochezia, no hematuria, no melena, no nausea, no shortness of breath, no sore throat, no vaginal bleeding, no vaginal discharge and no vomiting        Prior to Admission Medications   Prescriptions Last Dose Informant Patient Reported? Taking?    Magnesium 500 MG CAPS   Yes No   Sig: Take by mouth daily at bedtime   Multiple Vitamins-Minerals (ALIVE MULTI-VITAMIN PO)   Yes No   Sig: Take by mouth daily at bedtime   Omega-3 Fatty Acids (Fish Oil) 1200 MG CAPS   Yes No   Sig: Take by mouth daily at bedtime   Patient not taking: Reported on 12/4/2022   TURMERIC PO   Yes No   Sig: Take by mouth daily at bedtime   benzonatate (TESSALON) 200 MG capsule   No No   Sig: Take 1 capsule (200 mg total) by mouth 3 (three) times a day as needed for cough   cyclobenzaprine (FLEXERIL) 5 mg tablet   No No   Sig: Take 1 tablet (5 mg total) by mouth 3 (three) times a day as needed for muscle spasms for up to 10 days   ergocalciferol (VITAMIN D2) 50,000 units   No No   Sig: TAKE 1 CAPSULE BY MOUTH ONE TIME PER WEEK   Patient not taking: Reported on 12/4/2022   levothyroxine 100 mcg tablet   Yes No   Sig: Take 100 mcg by mouth daily   naproxen (NAPROSYN) 500 mg tablet   No No   Sig: Take 1 tablet (500 mg total) by mouth 2 (two) times a day with meals   Patient not taking: Reported on 12/4/2022   nicotine (NICODERM CQ) 14 mg/24hr TD 24 hr patch   No No   Sig: Place 1 patch on the skin every 24 hours   nicotine (NICODERM CQ) 7 mg/24hr TD 24 hr patch   No No   Sig: Place 1 patch on the skin every 24 hours for 14 days Start this dose following completion of 14mg dose   triamcinolone (KENALOG) 0 5 % cream   No No   Sig: Apply topically 3 (three) times a day   Patient not taking: Reported on 12/4/2022      Facility-Administered Medications: None       Past Medical History:   Diagnosis Date   • Colon polyp    • Disease of thyroid gland    • Urinary incontinence        Past Surgical History:   Procedure Laterality Date   • COLONOSCOPY     • LASER ABLATION OF THE CERVIX  2008   • KS ANTERIOR COLPORRAPHY RPR CYSTOCELE W/CYSTO N/A 7/14/2021    Procedure: Cystocele Repair, Sacrospinous Ligament fixation;  Surgeon: Kip Epstein MD;  Location: [facility];  Service: Gynecology   • KS CYSTOURETHROSCOPY N/A 7/14/2021    Procedure: Cuauhtemoc Nolasco;  Surgeon: Kip Epstein MD;  Location[facility];  Service: Gynecology   • KS SLING OPERATION STRESS INCONTINENCE N/A 7/14/2021    Procedure: INSERTION PUBOVAGINAL SLING (FEMALE); Surgeon: Kip Epstein MD;  Location: [facility];  Service: Gynecology   • THYROIDECTOMY         History reviewed  No pertinent family history  I have reviewed and agree with the history as documented      E-Cigarette/Vaping   • E-Cigarette Use Never User      E-Cigarette/Vaping Substances   • Nicotine No    • THC No    • CBD No    • Flavoring No    • Other No    • Unknown No      Social History     Tobacco Use   • Smoking status: Every Day     Packs/day: 0 50     Types: Cigarettes   • Smokeless tobacco: Never   Vaping Use   • Vaping Use: Never used   Substance Use Topics   • Alcohol use: Yes     Comment: occasionally   • Drug use: Not Currently       Review of Systems   Constitutional: Negative for chills, fatigue and fever  HENT: Negative for sore throat  Respiratory: Negative for cough and shortness of breath  Cardiovascular: Negative for chest pain, palpitations and leg swelling  Gastrointestinal: Positive for abdominal pain and constipation (reports she did not have her normal bowel movement today, last bowel movement yesterday )  Negative for abdominal distention, anal bleeding, anorexia, blood in stool, diarrhea, hematemesis, hematochezia, melena, nausea and vomiting  Genitourinary: Negative for decreased urine volume, difficulty urinating, dysuria, flank pain, frequency, hematuria, urgency, vaginal bleeding and vaginal discharge  Musculoskeletal: Negative for back pain  Skin: Negative for color change and rash  Neurological: Negative for dizziness, weakness and numbness  All other systems reviewed and are negative  Physical Exam  Physical Exam  Vitals and nursing note reviewed  Constitutional:       General: She is awake  She is not in acute distress  Appearance: Normal appearance  She is not ill-appearing, toxic-appearing or diaphoretic  HENT:      Head: Normocephalic  Right Ear: External ear normal       Left Ear: External ear normal       Mouth/Throat:      Lips: Pink  Mouth: Mucous membranes are moist    Eyes:      General: Lids are normal  Vision grossly intact  Cardiovascular:      Rate and Rhythm: Normal rate and regular rhythm  Heart sounds: Normal heart sounds  No murmur heard  Pulmonary:      Effort: Pulmonary effort is normal  No respiratory distress  Breath sounds: Normal breath sounds  Abdominal:      General: There is no distension  Palpations: Abdomen is soft  There is no mass  Tenderness: There is abdominal tenderness in the right upper quadrant, epigastric area, periumbilical area and left upper quadrant  There is no right CVA tenderness, left CVA tenderness or guarding  Musculoskeletal:      Cervical back: Neck supple  Right lower leg: No edema  Left lower leg: No edema  Skin:     General: Skin is warm and dry  Capillary Refill: Capillary refill takes less than 2 seconds  Findings: No erythema or rash  Neurological:      Mental Status: She is alert           Vital Signs  ED Triage Vitals   Temperature Pulse Respirations Blood Pressure SpO2   03/15/23 1814 03/15/23 1814 03/15/23 1814 03/15/23 1814 03/15/23 1814   97 5 °F (36 4 °C) 75 20 131/96 98 %      Temp Source Heart Rate Source Patient Position - Orthostatic VS BP Location FiO2 (%)   03/15/23 1814 03/15/23 1814 03/15/23 1814 03/15/23 1814 --   Oral Monitor Sitting Left arm       Pain Score       03/15/23 1842       8           Vitals:    03/15/23 1814   BP: 131/96   Pulse: 75   Patient Position - Orthostatic VS: Sitting         Visual Acuity      ED Medications  Medications   ketorolac (TORADOL) injection 15 mg (15 mg Intravenous Given 3/15/23 1842)   sodium chloride 0 9 % bolus 1,000 mL (0 mL Intravenous Stopped 3/15/23 2005)   iohexol (OMNIPAQUE) 350 MG/ML injection (SINGLE-DOSE) 100 mL (100 mL Intravenous Given 3/15/23 1922)       Diagnostic Studies  Results Reviewed     Procedure Component Value Units Date/Time    HS Troponin 0hr (reflex protocol) [[id number]]  (Normal) Collected: 03/15/23 1845    Lab Status: Final result Specimen: Blood from Arm, Left Updated: 03/15/23 1915     hs TnI 0hr 3 ng/L     Comprehensive metabolic panel [[id number]] Collected: 03/15/23 1845    Lab Status: Final result Specimen: Blood from Arm, Left Updated: 03/15/23 1908     Sodium 139 mmol/L      Potassium 4 1 mmol/L      Chloride 102 mmol/L      CO2 30 mmol/L      ANION GAP 7 mmol/L      BUN 14 mg/dL      Creatinine 0 72 mg/dL      Glucose 92 mg/dL      Calcium 9 8 mg/dL      AST 22 U/L      ALT 10 U/L      Alkaline Phosphatase 54 U/L      Total Protein 7 1 g/dL      Albumin 4 3 g/dL      Total Bilirubin 0 36 mg/dL      eGFR 92 ml/min/1 73sq m     Narrative:      National Kidney Disease Foundation guidelines for Chronic Kidney Disease (CKD):   •  Stage 1 with normal or high GFR (GFR > 90 mL/min/1 73 square meters)  •  Stage 2 Mild CKD (GFR = 60-89 mL/min/1 73 square meters)  •  Stage 3A Moderate CKD (GFR = 45-59 mL/min/1 73 square meters)  •  Stage 3B Moderate CKD (GFR = 30-44 mL/min/1 73 square meters)  •  Stage 4 Severe CKD (GFR = 15-29 mL/min/1 73 square meters)  •  Stage 5 End Stage CKD (GFR <15 mL/min/1 73 square meters)  Note: GFR calculation is accurate only with a steady state creatinine    Lipase [[id number]]  (Normal) Collected: 03/15/23 1845    Lab Status: Final result Specimen: Blood from Arm, Left Updated: 03/15/23 1908     Lipase 51 u/L     CBC and differential [[id number]] Collected: 03/15/23 1845    Lab Status: Final result Specimen: Blood from Arm, Left Updated: 03/15/23 1852     WBC 6 28 Thousand/uL      RBC 4 60 Million/uL      Hemoglobin 13 1 g/dL      Hematocrit 41 3 %      MCV 90 fL      MCH 28 5 pg      MCHC 31 7 g/dL      RDW 14 6 %      MPV 10 7 fL      Platelets 118 Thousands/uL      nRBC 0 /100 WBCs      Neutrophils Relative 56 %      Immat GRANS % 0 %      Lymphocytes Relative 34 %      Monocytes Relative 7 %      Eosinophils Relative 2 %      Basophils Relative 1 %      Neutrophils Absolute 3 53 Thousands/µL      Immature Grans Absolute 0 02 Thousand/uL      Lymphocytes Absolute 2 13 Thousands/µL      Monocytes Absolute 0 46 Thousand/µL      Eosinophils Absolute 0 10 Thousand/µL      Basophils Absolute 0 04 Thousands/µL     Urine Microscopic [[id number]]  (Normal) Collected: 03/15/23 1833    Lab Status: Final result Specimen: Urine, Other Updated: 03/15/23 1849 RBC, UA 1-2 /hpf      WBC, UA 1-2 /hpf      Epithelial Cells Occasional /hpf      Bacteria, UA Occasional /hpf     UA (URINE) with reflex to Scope [[id number]]  (Abnormal) Collected: 03/15/23 1833    Lab Status: Final result Specimen: Urine, Other Updated: 03/15/23 1842     Color, UA Straw     Clarity, UA Clear     Specific Gravity, UA 1 015     pH, UA 7 0     Leukocytes, UA 25 0     Nitrite, UA Negative     Protein, UA Negative mg/dl      Glucose, UA Negative mg/dl      Ketones, UA Negative mg/dl      Bilirubin, UA Negative     Occult Blood, UA 50 0     UROBILINOGEN UA Negative mg/dL                  CT abdomen pelvis with contrast   Final Result by Suzette Hollins MD (03/15 2057)      No acute abdominopelvic pathology  Workstation performed: [id number]                    Procedures  Procedures         ED Course  ED Course as of 03/17/23 1817   Wed Mar 15, 2023   1921 hs TnI 0hr: 3  Pain >3 hours will discontinue    2027 Reports pain is much better    2058 CT abdomen pelvis with contrast     IMPRESSION:     No acute abdominopelvic pathology  SBIRT 20yo+    Flowsheet Row Most Recent Value   SBIRT (25 yo +)    In order to provide better care to our patients, we are screening all of our patients for alcohol and drug use  Would it be okay to ask you these screening questions? No Filed at: 03/15/2023 2006                    Medical Decision Making  Differential diagnosis includes but not limited to: viral syndrome, constipation, AMI, NSTEMI, pneumonia, pneumothorax, gerd, gastritis,  mesenteric ischemia, mesenteric adenitis, pancreatitis, cholecystitis, choledocholithiasis, hepatitis, bowel obstruction, ileus, gastroenteritis, colitis, malignancy, acute kidney injury, splenic infarct, splenic injury, nephrolithiasis, UTI, muscular strain, intra-abdominal hematoma, hernia  Epigastric, left upper quadrant abdominal tenderness noted on exam, no peritoneal signs    Patient denies any vomiting, tolerating p o  with pain  Lungs without focal lung findings, no respiratory distress, clear to auscultation bilaterally, do not suspect pneumonia or pneumothorax at this time given exam, do not feel further imaging is necessary  Plan for CBC, CMP, lipase, troponin, UA, CT abdomen pelvis with contrast, ECG  ECG without acute ischemic changes or dysrhythmia, high-sensitivity troponin of 3, pain present for greater than 3 hours, low likelihood of ACS ,  Delta troponin not necessary at this time  Blood work was normal, no leukocytosis, hemoglobin within normal limits, liver function testing kidney function within normal limits, no lipase elevation  CT abdomen pelvis without any acute findings  UA with some leukocytes and hematuria, no significant amount under microscope, patient denies any urinary symptoms, no indications for antibiotics at this time, however patient advised to follow-up with urology concerning hematuria  Patient tolerating p o , pain well controlled  Plan for supportive care at home, outpatient follow-up  All imaging and/or lab testing discussed with patient, strict return to ED precautions discussed  Patient recommended to follow up promptly with appropriate outpatient provider  Patient and/or family members verbalizes understanding and agrees with plan  Patient and/or family members were given opportunity to ask questions, all questions were answered at this time  Patient is stable for discharge      Portions of the record may have been created with voice recognition software  Occasional wrong word or "sound a like" substitutions may have occurred due to the inherent limitations of voice recognition software  Read the chart carefully and recognize, using context, where substitutions have occurred  Amount and/or Complexity of Data Reviewed  Labs: ordered  Decision-making details documented in ED Course  Radiology: ordered   Decision-making details documented in ED Course  Risk  OTC drugs  Prescription drug management  Disposition  Final diagnoses:   Acute upper abdominal pain     Time reflects when diagnosis was documented in both MDM as applicable and the Disposition within this note     Time User Action Codes Description Comment    3/15/2023  8:59 PM Brandon Hendrix Add [R10 10] Acute upper abdominal pain       ED Disposition     ED Disposition   Discharge    Condition   Stable    Date/Time   Wed Mar 15, 2023  9:02 PM    Comment   Vinh Carrillo discharge to home/self care                 Follow-up Information     Follow up With Specialties Details Why [address] [facility] [place] Schedule an appointment as soon as possible for a visit  For follow up regarding your hematuria [facility]  [address] [facility] [address]  [address]  [facility] [address], Naima Ng, [facility], [place], [address] [phone]    Juan Haddad MD Family Medicine Schedule an appointment as soon as possible for a visit  For follow up regarding your symptoms [facility] [address] [place] [address] [facility] Schedule an appointment as soon as possible for a visit  For follow up regarding your symptoms [address]  [address] [facility] [address]  Tashi [place] [address] [facility] [place], [address], [address] [facility], [place], [place], [address] [phone]          Discharge Medication List as of 3/15/2023  9:03 PM      START taking these medications    Details   aluminum-magnesium hydroxide-simethicone (MAALOX) 200-200-20 MG/5ML SUSP Take 15 mL by mouth 4 (four) times a day (before meals and at bedtime), Starting Wed 3/15/2023, Normal         CONTINUE these medications which have NOT CHANGED    Details   benzonatate (TESSALON) 200 MG capsule Take 1 capsule (200 mg total) by mouth 3 (three) times a day as needed for cough, Starting Sun 12/4/2022, Normal      cyclobenzaprine (FLEXERIL) 5 mg tablet Take 1 tablet (5 mg total) by mouth 3 (three) times a day as needed for muscle spasms for up to 10 days, Starting Fri 7/8/2022, Until Mon 7/18/2022 at 2359, Normal      ergocalciferol (VITAMIN D2) 50,000 units TAKE 1 CAPSULE BY MOUTH ONE TIME PER WEEK, Normal      levothyroxine 100 mcg tablet Take 100 mcg by mouth daily, Starting Wed 10/7/2020, Historical Med      Magnesium 500 MG CAPS Take by mouth daily at bedtime, Historical Med      Multiple Vitamins-Minerals (ALIVE MULTI-VITAMIN PO) Take by mouth daily at bedtime, Historical Med      naproxen (NAPROSYN) 500 mg tablet Take 1 tablet (500 mg total) by mouth 2 (two) times a day with meals, Starting Thu 6/2/2022, Normal      nicotine (NICODERM CQ) 14 mg/24hr TD 24 hr patch Place 1 patch on the skin every 24 hours, Starting Tue 5/31/2022, Until Tue 7/12/2022, Normal      nicotine (NICODERM CQ) 7 mg/24hr TD 24 hr patch Place 1 patch on the skin every 24 hours for 14 days Start this dose following completion of 14mg dose, Starting Tue 5/31/2022, Until Tue 6/14/2022, Normal      Omega-3 Fatty Acids (Fish Oil) 1200 MG CAPS Take by mouth daily at bedtime, Historical Med      triamcinolone (KENALOG) 0 5 % cream Apply topically 3 (three) times a day, Starting Mon 6/20/2022, Normal      TURMERIC PO Take by mouth daily at bedtime, Historical Med             No discharge procedures on file      PDMP Review       Value Time User    PDMP Reviewed  Yes 7/14/2021  2:10 PM Dariana Salas MD          ED Provider  Electronically Signed by           Giovanni Cunningham PA-C  03/17/23 7024

## 2023-03-16 NOTE — DISCHARGE INSTRUCTIONS
Follow-up with PCP, GI as needed  Follow-up with urology concerning hematuria  Return to ED for any worsening symptoms as discussed

## 2023-06-12 ENCOUNTER — APPOINTMENT (EMERGENCY)
Dept: RADIOLOGY | Facility: HOSPITAL | Age: 58
End: 2023-06-12
Payer: COMMERCIAL

## 2023-06-12 ENCOUNTER — HOSPITAL ENCOUNTER (EMERGENCY)
Facility: HOSPITAL | Age: 58
Discharge: HOME | End: 2023-06-12
Attending: EMERGENCY MEDICINE
Payer: COMMERCIAL

## 2023-06-12 VITALS
HEART RATE: 70 BPM | SYSTOLIC BLOOD PRESSURE: 135 MMHG | DIASTOLIC BLOOD PRESSURE: 86 MMHG | TEMPERATURE: 97.7 F | RESPIRATION RATE: 18 BRPM | OXYGEN SATURATION: 98 %

## 2023-06-12 DIAGNOSIS — R10.13 EPIGASTRIC PAIN: Primary | ICD-10-CM

## 2023-06-12 LAB
2HR DELTA HS TROPONIN: -2 NG/L
ALBUMIN SERPL BCP-MCNC: 3.3 G/DL (ref 3.5–5)
ALP SERPL-CCNC: 56 U/L (ref 46–116)
ALT SERPL W P-5'-P-CCNC: 16 U/L (ref 12–78)
ANION GAP SERPL CALCULATED.3IONS-SCNC: -1 MMOL/L (ref 4–13)
AST SERPL W P-5'-P-CCNC: 23 U/L (ref 5–45)
ATRIAL RATE: 68 BPM
BASOPHILS # BLD AUTO: 0.05 THOUSANDS/ÂΜL (ref 0–0.1)
BASOPHILS NFR BLD AUTO: 1 % (ref 0–1)
BILIRUB SERPL-MCNC: 0.33 MG/DL (ref 0.2–1)
BUN SERPL-MCNC: 15 MG/DL (ref 5–25)
CALCIUM ALBUM COR SERPL-MCNC: 9.7 MG/DL (ref 8.3–10.1)
CALCIUM SERPL-MCNC: 9.1 MG/DL (ref 8.3–10.1)
CARDIAC TROPONIN I PNL SERPL HS: 4 NG/L
CARDIAC TROPONIN I PNL SERPL HS: 6 NG/L
CHLORIDE SERPL-SCNC: 112 MMOL/L (ref 96–108)
CO2 SERPL-SCNC: 30 MMOL/L (ref 21–32)
CREAT SERPL-MCNC: 0.73 MG/DL (ref 0.6–1.3)
EOSINOPHIL # BLD AUTO: 0.11 THOUSAND/ÂΜL (ref 0–0.61)
EOSINOPHIL NFR BLD AUTO: 2 % (ref 0–6)
ERYTHROCYTE [DISTWIDTH] IN BLOOD BY AUTOMATED COUNT: 14.5 % (ref 11.6–15.1)
GFR SERPL CREATININE-BSD FRML MDRD: 91 ML/MIN/1.73SQ M
GLUCOSE SERPL-MCNC: 117 MG/DL (ref 65–140)
HCT VFR BLD AUTO: 42 % (ref 34.8–46.1)
HGB BLD-MCNC: 13.2 G/DL (ref 11.5–15.4)
IMM GRANULOCYTES # BLD AUTO: 0.02 THOUSAND/UL (ref 0–0.2)
IMM GRANULOCYTES NFR BLD AUTO: 0 % (ref 0–2)
LIPASE SERPL-CCNC: 41 U/L (ref 11–82)
LYMPHOCYTES # BLD AUTO: 2.09 THOUSANDS/ÂΜL (ref 0.6–4.47)
LYMPHOCYTES NFR BLD AUTO: 28 % (ref 14–44)
MCH RBC QN AUTO: 28.7 PG (ref 26.8–34.3)
MCHC RBC AUTO-ENTMCNC: 31.4 G/DL (ref 31.4–37.4)
MCV RBC AUTO: 91 FL (ref 82–98)
MONOCYTES # BLD AUTO: 0.44 THOUSAND/ÂΜL (ref 0.17–1.22)
MONOCYTES NFR BLD AUTO: 6 % (ref 4–12)
NEUTROPHILS # BLD AUTO: 4.82 THOUSANDS/ÂΜL (ref 1.85–7.62)
NEUTS SEG NFR BLD AUTO: 63 % (ref 43–75)
NRBC BLD AUTO-RTO: 0 /100 WBCS
P AXIS: 76 DEGREES
PLATELET # BLD AUTO: 228 THOUSANDS/UL (ref 149–390)
PMV BLD AUTO: 10.8 FL (ref 8.9–12.7)
POTASSIUM SERPL-SCNC: 3.9 MMOL/L (ref 3.5–5.3)
PR INTERVAL: 156 MS
PROT SERPL-MCNC: 6.6 G/DL (ref 6.4–8.4)
QRS AXIS: 80 DEGREES
QRSD INTERVAL: 66 MS
QT INTERVAL: 384 MS
QTC INTERVAL: 408 MS
RBC # BLD AUTO: 4.6 MILLION/UL (ref 3.81–5.12)
SODIUM SERPL-SCNC: 141 MMOL/L (ref 135–147)
T WAVE AXIS: 78 DEGREES
VENTRICULAR RATE: 68 BPM
WBC # BLD AUTO: 7.53 THOUSAND/UL (ref 4.31–10.16)

## 2023-06-12 PROCEDURE — 36415 COLL VENOUS BLD VENIPUNCTURE: CPT

## 2023-06-12 PROCEDURE — 93010 ELECTROCARDIOGRAM REPORT: CPT | Performed by: INTERNAL MEDICINE

## 2023-06-12 PROCEDURE — 71046 X-RAY EXAM CHEST 2 VIEWS: CPT

## 2023-06-12 PROCEDURE — 84484 ASSAY OF TROPONIN QUANT: CPT | Performed by: EMERGENCY MEDICINE

## 2023-06-12 PROCEDURE — 85025 COMPLETE CBC W/AUTO DIFF WBC: CPT | Performed by: EMERGENCY MEDICINE

## 2023-06-12 PROCEDURE — 80053 COMPREHEN METABOLIC PANEL: CPT | Performed by: EMERGENCY MEDICINE

## 2023-06-12 PROCEDURE — 93005 ELECTROCARDIOGRAM TRACING: CPT

## 2023-06-12 PROCEDURE — 83690 ASSAY OF LIPASE: CPT | Performed by: EMERGENCY MEDICINE

## 2023-06-12 PROCEDURE — 99285 EMERGENCY DEPT VISIT HI MDM: CPT

## 2023-06-12 NOTE — ED PROVIDER NOTES
History  Chief Complaint   Patient presents with   • Chest Pain     Pt states quick onset of upper abdominal pain and burping that radiated to chest       HPI  Patient is a 61year old female with history of hypothyroid, GERD presenting to the emergency department for chest / abdominal pain and burping  Patient states that just prior to arrival she developed epigastric pain that radiated into her chest, and was associated with burping  She denies having any shortness of breath, she did have some nausea  Her pain lasted for roughly 10 minutes, resolved, then returned again for another 10 minutes  Her pain has completely resolved since then and she denies having any pain at this time  She states that she had a similar episode of pain in March but it was more severe at that time  She saw her GI doctor after that episode in March and says they will likely do an upper endoscopy if her symptoms return  Prior to Admission Medications   Prescriptions Last Dose Informant Patient Reported? Taking?    Magnesium 500 MG CAPS   Yes No   Sig: Take by mouth daily at bedtime   Multiple Vitamins-Minerals (ALIVE MULTI-VITAMIN PO)   Yes No   Sig: Take by mouth daily at bedtime   Omega-3 Fatty Acids (Fish Oil) 1200 MG CAPS   Yes No   Sig: Take by mouth daily at bedtime   Patient not taking: Reported on 12/4/2022   TURMERIC PO   Yes No   Sig: Take by mouth daily at bedtime   aluminum-magnesium hydroxide-simethicone (MAALOX) 200-200-20 MG/5ML SUSP   No No   Sig: Take 15 mL by mouth 4 (four) times a day (before meals and at bedtime)   benzonatate (TESSALON) 200 MG capsule   No No   Sig: Take 1 capsule (200 mg total) by mouth 3 (three) times a day as needed for cough   cyclobenzaprine (FLEXERIL) 5 mg tablet   No No   Sig: Take 1 tablet (5 mg total) by mouth 3 (three) times a day as needed for muscle spasms for up to 10 days   ergocalciferol (VITAMIN D2) 50,000 units   No No   Sig: TAKE 1 CAPSULE BY MOUTH ONE TIME PER WEEK   Patient not taking: Reported on 12/4/2022   levothyroxine 100 mcg tablet   Yes No   Sig: Take 100 mcg by mouth daily   methocarbamol (ROBAXIN) 750 mg tablet   No No   Sig: Take 1 tablet (750 mg total) by mouth every 6 (six) hours as needed for muscle spasms for up to 10 days   naproxen (NAPROSYN) 500 mg tablet   No No   Sig: Take 1 tablet (500 mg total) by mouth 2 (two) times a day with meals   Patient not taking: Reported on 12/4/2022   nicotine (NICODERM CQ) 7 mg/24hr TD 24 hr patch   No No   Sig: Place 1 patch on the skin over 16 hours every 24 hours for 14 days Start this dose following completion of 14mg dose   triamcinolone (KENALOG) 0 5 % cream   No No   Sig: Apply topically 3 (three) times a day   Patient not taking: Reported on 12/4/2022      Facility-Administered Medications: None       Past Medical History:   Diagnosis Date   • Colon polyp    • Disease of thyroid gland    • Urinary incontinence        Past Surgical History:   Procedure Laterality Date   • COLONOSCOPY     • LASER ABLATION OF THE CERVIX  2008   • [place] ANTERIOR COLPORRAPHY RPR CYSTOCELE W/CYSTO N/A 7/14/2021    Procedure: Cystocele Repair, Sacrospinous Ligament fixation;  Surgeon: Angelita Willett MD;  Location: [facility];  Service: Gynecology   • OK CYSTOURETHROSCOPY N/A 7/14/2021    Procedure: CYSTOSCOPY;  Surgeon: Angelita Willett MD;  [facility];  Service: Gynecology   • OK SLING OPERATION STRESS INCONTINENCE N/A 7/14/2021    Procedure: INSERTION PUBOVAGINAL SLING (FEMALE); Surgeon: Angelita Willett MD;  Location: [facility];  Service: Gynecology   • THYROIDECTOMY         No family history on file  I have reviewed and agree with the history as documented      E-Cigarette/Vaping   • E-Cigarette Use Never User      E-Cigarette/Vaping Substances   • Nicotine No    • THC No    • CBD No    • Flavoring No    • Other No    • Unknown No      Social History     Tobacco Use   • Smoking status: Every Day     Packs/day: 0 50     Types: Cigarettes   • Smokeless tobacco: Never   Vaping Use   • Vaping Use: Never used   Substance Use Topics   • Alcohol use: Yes     Comment: occasionally   • Drug use: Not Currently        Review of Systems   Constitutional: Negative for chills and fever  HENT: Negative for congestion  Eyes: Negative for redness  Respiratory: Negative for cough and shortness of breath  Cardiovascular: Positive for chest pain  Negative for palpitations  Gastrointestinal: Positive for abdominal pain and nausea  Negative for diarrhea and vomiting  Endocrine: Negative for polyuria  Genitourinary: Negative for dysuria and hematuria  Musculoskeletal: Negative for arthralgias and back pain  Skin: Negative for rash  Neurological: Negative for light-headedness and headaches  All other systems reviewed and are negative  Physical Exam  ED Triage Vitals [06/12/23 1453]   Temperature Pulse Respirations Blood Pressure SpO2   97 7 °F (36 5 °C) 81 18 124/80 98 %      Temp Source Heart Rate Source Patient Position - Orthostatic VS BP Location FiO2 (%)   Oral Monitor Sitting Right arm --      Pain Score       8             Orthostatic Vital Signs  Vitals:    06/12/23 1453 06/12/23 1600   BP: 124/80 135/86   Pulse: 81 70   Patient Position - Orthostatic VS: Sitting        Physical Exam  Vitals and nursing note reviewed  Constitutional:       Appearance: Normal appearance  HENT:      Head: Normocephalic and atraumatic  Mouth/Throat:      Mouth: Mucous membranes are moist    Eyes:      Conjunctiva/sclera: Conjunctivae normal    Cardiovascular:      Rate and Rhythm: Normal rate and regular rhythm  Pulses: Normal pulses  Heart sounds: Normal heart sounds  Pulmonary:      Effort: Pulmonary effort is normal  No respiratory distress  Breath sounds: Normal breath sounds  Chest:      Chest wall: No tenderness  Abdominal:      Palpations: Abdomen is soft  Tenderness: There is no abdominal tenderness     Musculoskeletal: General: No tenderness  Cervical back: Neck supple  Skin:     General: Skin is warm and dry  Capillary Refill: Capillary refill takes less than 2 seconds  Neurological:      General: No focal deficit present  Mental Status: She is alert  Mental status is at baseline     Psychiatric:         Mood and Affect: Mood normal          ED Medications  Medications - No data to display    Diagnostic Studies  Results Reviewed     Procedure Component Value Units Date/Time    HS Troponin I 2hr [[id number]]  (Normal) Collected: 06/12/23 1727    Lab Status: Final result Specimen: Blood from Arm, Right Updated: 06/12/23 1803     hs TnI 2hr 4 ng/L      Delta 2hr hsTnI -2 ng/L     Lipase [[id number]]  (Normal) Collected: 06/12/23 1512    Lab Status: Final result Specimen: Blood from Arm, Left Updated: 06/12/23 1704     Lipase 41 u/L     HS Troponin I 4hr [[id number]]     Lab Status: No result Specimen: Blood     HS Troponin 0hr (reflex protocol) [[id number]]  (Normal) Collected: 06/12/23 1512    Lab Status: Final result Specimen: Blood from Arm, Left Updated: 06/12/23 1552     hs TnI 0hr 6 ng/L     Comprehensive metabolic panel [[id number]]  (Abnormal) Collected: 06/12/23 1512    Lab Status: Final result Specimen: Blood from Arm, Left Updated: 06/12/23 1545     Sodium 141 mmol/L      Potassium 3 9 mmol/L      Chloride 112 mmol/L      CO2 30 mmol/L      ANION GAP -1 mmol/L      BUN 15 mg/dL      Creatinine 0 73 mg/dL      Glucose 117 mg/dL      Calcium 9 1 mg/dL      Corrected Calcium 9 7 mg/dL      AST 23 U/L      ALT 16 U/L      Alkaline Phosphatase 56 U/L      Total Protein 6 6 g/dL      Albumin 3 3 g/dL      Total Bilirubin 0 33 mg/dL      eGFR 91 ml/min/1 73sq m     Narrative:      Prakash guidelines for Chronic Kidney Disease (CKD):   •  Stage 1 with normal or high GFR (GFR > 90 mL/min/1 73 square meters)  •  Stage 2 Mild CKD (GFR = 60-89 mL/min/1 73 square meters)  •  Stage 3A Moderate CKD (GFR = 45-59 mL/min/1 73 square meters)  •  Stage 3B Moderate CKD (GFR = 30-44 mL/min/1 73 square meters)  •  Stage 4 Severe CKD (GFR = 15-29 mL/min/1 73 square meters)  •  Stage 5 End Stage CKD (GFR <15 mL/min/1 73 square meters)  Note: GFR calculation is accurate only with a steady state creatinine    CBC and differential [[id number]] Collected: 06/12/23 1512    Lab Status: Final result Specimen: Blood from Arm, Left Updated: 06/12/23 1520     WBC 7 53 Thousand/uL      RBC 4 60 Million/uL      Hemoglobin 13 2 g/dL      Hematocrit 42 0 %      MCV 91 fL      MCH 28 7 pg      MCHC 31 4 g/dL      RDW 14 5 %      MPV 10 8 fL      Platelets 390 Thousands/uL      nRBC 0 /100 WBCs      Neutrophils Relative 63 %      Immat GRANS % 0 %      Lymphocytes Relative 28 %      Monocytes Relative 6 %      Eosinophils Relative 2 %      Basophils Relative 1 %      Neutrophils Absolute 4 82 Thousands/µL      Immature Grans Absolute 0 02 Thousand/uL      Lymphocytes Absolute 2 09 Thousands/µL      Monocytes Absolute 0 44 Thousand/µL      Eosinophils Absolute 0 11 Thousand/µL      Basophils Absolute 0 05 Thousands/µL                  XR chest 2 views   ED Interpretation by Jessica Lewis DO (06/12 1717)   No acute cardiopulmonary abnormality             Procedures  Procedures      ED Course  ED Course as of 06/12/23 1815   Mon Jun 12, 2023   1634 Procedure Note: EKG  Date/Time: 06/12/23 4:34 PM   Interpreted by: Ana Ortiz  Indications / Diagnosis: chest pain  ECG reviewed by me, the ED Provider: yes   The EKG demonstrates:  Rate: 68  Rhythm: NSR  Intervals: regular  Axis: regular  QRS/Blocks: regular  ST Changes: No acute ST Changes, no STD/KAYCEE  Compared to prior EKG on 3/15/23, no acute change  1716 Lipase: 41  Low concern for pancreatitis  [address] hsTnI: -2                                       Medical Decision Making  Amount and/or Complexity of Data Reviewed  Labs: ordered   Decision-making details documented in ED Course  Radiology: ordered and independent interpretation performed  Patient is a 61year old female with PMH of GERD, hypothyroid who presents to the ED with chest pain, epigastric pain, and burping  Vital signs stable  On exam no abdominal TTP  History and physical exam most consistent with GERD  However, differential diagnosis included but not limited to ACS, pancreatitis, cholecystitis  Plan CBC, CMP, lipase, troponin, CXR, ekg  Patient has no symptoms at this time, if she develops nausea / pain while in the ED will treat  Initial troponin 6, two hour delta troponin obtained because patients symptoms started just prior to arrival, 2 hour delta troponin was -2  Low concern for cardiac cause of patients symptoms  View ED course above for further discussion on patient workup  On review of previous records patient seen in the ED in March, had a CT abdomen/pelvis at that time which did not show any abnormalities  All labs reviewed and utilized in the medical decision making process  All radiology studies independently viewed by me and interpreted by the radiologist   I reviewed all testing with the patient  Upon re-evaluation patient continues to not have any pain  I have reviewed the patient's vital signs, nursing notes, and other relevant tests/information  I had a detailed discussion with the patient regarding the history, exam findings, and any diagnostic results  Plan to discharge home in stable condition, follow up with GI  Discussed with patient who is agreeable to plan  I discussed discharge instructions, need for follow-up, and oral return precautions for what to return for in addition to the written return precautions and discharge instructions, specifically highlighting areas of special concern    The patient verbalized understanding of the discharge instructions and warnings that would necessitate return to the Emergency Department including exertional chest pain, abdominal pain, vomiting  All questions the patient had were answered prior to discharge to the best of my ability  Disposition  Final diagnoses:   Epigastric pain     Time reflects when diagnosis was documented in both MDM as applicable and the Disposition within this note     Time User Action Codes Description Comment    6/12/2023  6:07 PM Chana Rausch Add [R10 13] Epigastric pain       ED Disposition     ED Disposition   Discharge    Condition   Stable    Date/Time   Mon Jun 12, 2023  6:07 PM    Comment   Phillip Connell discharge to home/self care  Follow-up Information     Follow up With Specialties Details Why Contact Osvaldo Vincent MD Family Medicine Call  As needed [address]  [phone]            Patient's Medications   Discharge Prescriptions    No medications on file     No discharge procedures on file  PDMP Review       Value Time User    PDMP Reviewed  Yes 7/14/2021  2:10 PM Nicole Reeves MD           ED Provider  Attending physically available and evaluated Phillip Connell  I managed the patient along with the ED Attending      Electronically Signed by         Rashmi Madrid DO  06/12/23 2958

## 2023-06-12 NOTE — DISCHARGE INSTRUCTIONS
You were seen in the emergency department today for epigastric pain  Follow up with your GI doctor  Return to the emergency department for any new or concerning symptoms including worsening pain, fever, vomiting  Thank you for choosing [facility] for your care today

## 2023-06-14 NOTE — ED ATTENDING ATTESTATION
6/12/2023  IHemanth DO, saw and evaluated the patient  I have discussed the patient with the resident/non-physician practitioner and agree with the resident's/non-physician practitioner's findings, Plan of Care, and MDM as documented in the resident's/non-physician practitioner's note, except where noted  All available labs and Radiology studies were reviewed  I was present for key portions of any procedure(s) performed by the resident/non-physician practitioner and I was immediately available to provide assistance  At this point I agree with the current assessment done in the Emergency Department  I have conducted an independent evaluation of this patient a history and physical is as follows:    60yo female presents with chest pain  Pt states it is pain in the epigastric area that started just prior to arrival and was associated with burping  Has had similar pain in the past that was more severe and has followed with GI with plan to do EGD if she came to ED again with pain  No SOB, no fevers  On exam - nad, heart reg, lungs clear, abd soft  Plan - check cardiac labs and abdominal labs  Pt reports symptoms improved  Suspect possible GERD but will r/o pancreatitis and ACS as well      ED Course         Critical Care Time  Procedures

## 2023-06-26 DIAGNOSIS — Z12.31 VISIT FOR SCREENING MAMMOGRAM: Primary | ICD-10-CM

## 2023-06-27 ENCOUNTER — TRANSCRIBE ORDERS (OUTPATIENT)
Dept: ADMINISTRATIVE | Facility: HOSPITAL | Age: 58
End: 2023-06-27

## 2023-06-27 DIAGNOSIS — N64.4 BREAST PAIN, RIGHT: Primary | ICD-10-CM

## 2023-06-28 ENCOUNTER — TELEPHONE (OUTPATIENT)
Dept: GYNECOLOGY | Facility: CLINIC | Age: 58
End: 2023-06-28

## 2023-06-28 NOTE — PROGRESS NOTES
Signed orders for bilateral diagnostic mammogram and right breast ultrasound for breast pain  Patient not seen since November 2021 by Dr Leela Chavez, has appointment with me in 3 weeks time

## 2023-07-21 ENCOUNTER — ANNUAL EXAM (OUTPATIENT)
Dept: GYNECOLOGY | Facility: CLINIC | Age: 58
End: 2023-07-21
Payer: COMMERCIAL

## 2023-07-21 VITALS
HEIGHT: 64 IN | SYSTOLIC BLOOD PRESSURE: 120 MMHG | DIASTOLIC BLOOD PRESSURE: 82 MMHG | BODY MASS INDEX: 22.94 KG/M2 | WEIGHT: 134.4 LBS

## 2023-07-21 DIAGNOSIS — R92.2 DENSE BREAST TISSUE ON MAMMOGRAM: ICD-10-CM

## 2023-07-21 DIAGNOSIS — Z01.419 ENCOUNTER FOR ANNUAL ROUTINE GYNECOLOGICAL EXAMINATION: Primary | ICD-10-CM

## 2023-07-21 DIAGNOSIS — N95.2 VAGINAL ATROPHY: ICD-10-CM

## 2023-07-21 DIAGNOSIS — Z12.31 ENCOUNTER FOR SCREENING MAMMOGRAM FOR BREAST CANCER: ICD-10-CM

## 2023-07-21 DIAGNOSIS — N39.41 URGE INCONTINENCE: ICD-10-CM

## 2023-07-21 PROBLEM — R92.30 DENSE BREAST TISSUE ON MAMMOGRAM: Status: ACTIVE | Noted: 2023-07-21

## 2023-07-21 PROCEDURE — S0612 ANNUAL GYNECOLOGICAL EXAMINA: HCPCS | Performed by: OBSTETRICS & GYNECOLOGY

## 2023-07-21 PROCEDURE — G0145 SCR C/V CYTO,THINLAYER,RESCR: HCPCS | Performed by: OBSTETRICS & GYNECOLOGY

## 2023-07-21 PROCEDURE — G0476 HPV COMBO ASSAY CA SCREEN: HCPCS | Performed by: OBSTETRICS & GYNECOLOGY

## 2023-07-21 NOTE — PROGRESS NOTES
Assessment/Plan   Diagnoses and all orders for this visit:    Encounter for annual routine gynecological examination  -     Liquid-based pap, screening    Encounter for screening mammogram for breast cancer  -     Mammo screening bilateral w 3d & cad; Future    Urge incontinence  -     Urine culture; Future  -     Mirabegron ER 25 MG TB24; Take 25 mg by mouth in the morning    Dense breast tissue on mammogram  -     US breast screening bilateral complete (ABUS); Future    1. yearly exam- Pap smear done with HPV testing, self breast awareness reviewed, calcium/vitamin D recommendations discussed, mammogram request given, colonoscopy done follow-up as per specialist.  2. urinary incontinence-does note significant urge symptoms. She states the symptoms are not improved since her surgery in July 2011. At that time, she had repair of cystocele with sling placement. She was counseled that those surgeries will help with stress incontinence and she does not have this problem. She does have significant urgency which she states was her major problem prior to the surgery as well. We did discuss options. She has had microscopic hematuria for years, states that she has been told to see urology for 20 years. Recent testing with analysis from 3/15/2023 was dip with leukocytes and blood, but microscopy was negative. She did not have urine culture, this was ordered. Strongly suggested she consider Myrbetriq and she is interested in trying this. Electronic prescription for 25 mg dose to take daily to see if this will help. She will follow-up in 2 months time for exam.  Counseled about side effects, including possible slight increased risk of blood pressure. She denies any history of significant dry mouth or urinary or stomach retention or any glaucoma. 3. vaginal atrophy-mild changes noted on exam.  Vaginal furcation/moisturizer sheet given, to use accordingly. 4.  Dense breast-noted on most recent mammogram 7/7/2023. Apparently, she had some kind of right-sided discomfort into the axilla which has essentially resolved. Breast exam is negative today. We will defer any right breast ultrasound. Patient is interested in ABUS given dense changes, ABUS request was given today along with ABUS sheet. Highly encouraged to check with insurance company regarding coverage for this. 5. history of abnormal Pap-status post laser to her cervix in 2008 by history. Pap with HPV done today disposition as per findings. Follow-up 2 months time for office visit with me or as needed. Otherwise, follow-up 1 year for yearly exam.    Subjective   Patient ID: Lexis Sosa is a 60 y.o. female. Vitals:    07/21/23 0753   BP: 120/82     Patient was seen today for yearly exam.  Please see assessment plan for details. The following portions of the patient's history were reviewed and updated as appropriate: allergies, current medications, past family history, past medical history, past social history, past surgical history and problem list.  Past Medical History:   Diagnosis Date   • Colon polyp    • Disease of thyroid gland    • Urinary incontinence      Past Surgical History:   Procedure Laterality Date   • COLONOSCOPY     • LASER ABLATION OF THE CERVIX  2008   • PA ANTERIOR COLPORRAPHY RPR CYSTOCELE W/CYSTO N/A 7/14/2021    Procedure: Cystocele Repair, Sacrospinous Ligament fixation;  Surgeon: Gilma Marinelli MD;  [facility];  Service: Gynecology   • PA CYSTOURETHROSCOPY N/A 7/14/2021    Procedure: CYSTOSCOPY;  Surgeon: Gilma Marinelli MD;  [facility];  Service: Gynecology   • PA SLING OPERATION STRESS INCONTINENCE N/A 7/14/2021    Procedure: INSERTION PUBOVAGINAL SLING (FEMALE); Surgeon: Gilma Marinelli MD;  [facility];  Service: Gynecology   • THYROIDECTOMY       OB History   No obstetric history on file.        Current Outpatient Medications:   •  Mirabegron ER 25 MG TB24, Take 25 mg by mouth in the morning, Disp: 30 tablet, Rfl: 2  •  ergocalciferol (VITAMIN D2) 50,000 units, TAKE 1 CAPSULE BY MOUTH ONE TIME PER WEEK (Patient not taking: Reported on 12/4/2022), Disp: 12 capsule, Rfl: 1  •  levothyroxine 100 mcg tablet, Take 100 mcg by mouth daily, Disp: , Rfl:   •  Magnesium 500 MG CAPS, Take by mouth daily at bedtime, Disp: , Rfl:   •  Multiple Vitamins-Minerals (ALIVE MULTI-VITAMIN PO), Take by mouth daily at bedtime, Disp: , Rfl:   •  TURMERIC PO, Take by mouth daily at bedtime, Disp: , Rfl:   Allergies   Allergen Reactions   • Morphine Palpitations and Other (See Comments)     And itching     Social History     Socioeconomic History   • Marital status: /Civil Union     Spouse name: None   • Number of children: None   • Years of education: None   • Highest education level: None   Occupational History   • None   Tobacco Use   • Smoking status: Every Day     Packs/day: 0.50     Types: Cigarettes   • Smokeless tobacco: Never   Vaping Use   • Vaping Use: Never used   Substance and Sexual Activity   • Alcohol use: Yes     Comment: occasionally   • Drug use: Not Currently   • Sexual activity: Not Currently   Other Topics Concern   • None   Social History Narrative   • None     Social Determinants of Health     Financial Resource Strain: Not on file   Food Insecurity: Not on file   Transportation Needs: Not on file   Physical Activity: Not on file   Stress: Not on file   Social Connections: Not on file   Intimate Partner Violence: Not on file   Housing Stability: Not on file     No family history on file. Review of Systems   Constitutional: Negative for chills, diaphoresis, fatigue and fever. Respiratory: Negative for apnea, cough, chest tightness, shortness of breath and wheezing. Cardiovascular: Negative for chest pain, palpitations and leg swelling. Gastrointestinal: Negative for abdominal distention, abdominal pain, anal bleeding, constipation, diarrhea, nausea, rectal pain and vomiting.    Genitourinary: Negative for difficulty urinating, dyspareunia, dysuria, frequency, hematuria, menstrual problem, pelvic pain, urgency, vaginal bleeding, vaginal discharge and vaginal pain. Musculoskeletal: Negative for arthralgias, back pain and myalgias. Skin: Negative for color change and rash. Neurological: Negative for dizziness, syncope, light-headedness, numbness and headaches. Hematological: Negative for adenopathy. Does not bruise/bleed easily. Psychiatric/Behavioral: Negative for dysphoric mood and sleep disturbance. The patient is not nervous/anxious. Objective   Physical Exam  OBGyn Exam     Objective      /82 (BP Location: Right arm, Patient Position: Sitting, Cuff Size: Standard)   Ht 5' 4" (1.626 m)   Wt 61 kg (134 lb 6.4 oz)   BMI 23.07 kg/m²     General:   alert and oriented, in no acute distress   Neck: normal to inspection and palpation   Breast: normal appearance, no masses or tenderness   Heart:    Lungs:    Abdomen: soft, non-tender, without masses or organomegaly   Vulva: normal   Vagina: Without erythema or lesions or discharge. Normal.  Mildly atrophic   Cervix: Without lesions or discharge or cervicitis. No Cervical motion tenderness.   Mildly atrophic   Uterus: top normal size, anteverted, non-tender   Adnexa: no mass, fullness, tenderness   Rectum: negative    Psych:  Normal mood and affect   Skin:  Without obvious lesions   Eyes: symmetric, with normal movements and reactivity   Musculoskeletal:  Normal muscle tone and movements appreciated

## 2023-08-01 LAB
LAB AP GYN PRIMARY INTERPRETATION: NORMAL
Lab: NORMAL

## 2023-12-16 ENCOUNTER — OFFICE VISIT (OUTPATIENT)
Dept: URGENT CARE | Age: 58
End: 2023-12-16
Payer: COMMERCIAL

## 2023-12-16 VITALS
OXYGEN SATURATION: 99 % | TEMPERATURE: 98.2 F | WEIGHT: 140 LBS | RESPIRATION RATE: 18 BRPM | DIASTOLIC BLOOD PRESSURE: 88 MMHG | HEIGHT: 65 IN | BODY MASS INDEX: 23.32 KG/M2 | SYSTOLIC BLOOD PRESSURE: 132 MMHG | HEART RATE: 88 BPM

## 2023-12-16 DIAGNOSIS — H65.192 ACUTE EFFUSION OF LEFT EAR: Primary | ICD-10-CM

## 2023-12-16 DIAGNOSIS — S03.40XA SPRAIN OF TEMPOROMANDIBULAR JOINT, INITIAL ENCOUNTER: ICD-10-CM

## 2023-12-16 PROCEDURE — 99213 OFFICE O/P EST LOW 20 MIN: CPT

## 2023-12-16 RX ORDER — FLUTICASONE PROPIONATE 50 MCG
1 SPRAY, SUSPENSION (ML) NASAL DAILY
Qty: 11.1 ML | Refills: 0 | Status: SHIPPED | OUTPATIENT
Start: 2023-12-16

## 2023-12-16 RX ORDER — METHYLPREDNISOLONE 4 MG/1
TABLET ORAL
Qty: 21 TABLET | Refills: 0 | Status: SHIPPED | OUTPATIENT
Start: 2023-12-16

## 2023-12-16 NOTE — PROGRESS NOTES
[facility]        NAME: Myesha Blount is a 60 y.o. female  : 1965    MRN: [id number]  DATE: 2023  TIME: 6:14 PM    Assessment and Plan   Acute effusion of left ear [H65.192]  1. Acute effusion of left ear  fluticasone (FLONASE) 50 mcg/act nasal spray      2. Sprain of temporomandibular joint, initial encounter  methylPREDNISolone 4 MG tablet therapy pack    Ambulatory Referral to Oral Maxillofacial Surgery        Pt presents with 2 complaints- left ear pain . Has been congested. No fevers. Also has right side jaw pain- frequently grinds and has gone thru several retainers. No meds taken. Ear noted serous effusion. Referral to OMS and steroids. Patient Instructions       Follow up with PCP     Chief Complaint     Chief Complaint   Patient presents with    Jaw Pain     Right-sided jaw pain/dental pain? x 1 week, left ear pain x 3 days         History of Present Illness       Pt presents with 2 complaints- left ear pain . Has been congested. No fevers. Also has right side jaw pain- frequently grinds and has gone thru several retainers. No meds taken. Ear noted serous effusion. Referral to OMS and steroids. Review of Systems   Review of Systems   HENT:  Positive for congestion and ear pain. Musculoskeletal:  Positive for arthralgias. All other systems reviewed and are negative.         Current Medications       Current Outpatient Medications:     fluticasone (FLONASE) 50 mcg/act nasal spray, 1 spray into each nostril daily, Disp: 11.1 mL, Rfl: 0    levothyroxine 100 mcg tablet, Take 100 mcg by mouth daily, Disp: , Rfl:     Magnesium 500 MG CAPS, Take by mouth daily at bedtime, Disp: , Rfl:     methylPREDNISolone 4 MG tablet therapy pack, Use as directed on package, Disp: 21 tablet, Rfl: 0    Multiple Vitamins-Minerals (ALIVE MULTI-VITAMIN PO), Take by mouth daily at bedtime, Disp: , Rfl:     TURMERIC PO, Take by mouth daily at bedtime, Disp: , Rfl:     ergocalciferol (VITAMIN D2) 50,000 units, TAKE 1 CAPSULE BY MOUTH ONE TIME PER WEEK (Patient not taking: Reported on 12/4/2022), Disp: 12 capsule, Rfl: 1    Mirabegron ER 25 MG TB24, Take 25 mg by mouth in the morning (Patient not taking: Reported on 12/16/2023), Disp: 30 tablet, Rfl: 2    neomycin-polymyxin-hydrocortisone (CORTISPORIN) 0.35%-10,000 units/mL-1% otic suspension, Administer 4 drops into ears 4 (four) times a day (Patient not taking: Reported on 12/16/2023), Disp: 10 mL, Rfl: 0    tobramycin-dexamethasone (TOBRADEX) ophthalmic suspension, Administer 1 drop to both eyes every 4 (four) hours while awake (Patient not taking: Reported on 12/16/2023), Disp: 5 mL, Rfl: 0    Current Allergies     Allergies as of 12/16/2023 - Reviewed 12/16/2023   Allergen Reaction Noted    Morphine Palpitations and Other (See Comments) 08/08/2019            The following portions of the patient's history were reviewed and updated as appropriate: allergies, current medications, past family history, past medical history, past social history, past surgical history and problem list.     Past Medical History:   Diagnosis Date    Colon polyp     Disease of thyroid gland     Urinary incontinence        Past Surgical History:   Procedure Laterality Date    COLONOSCOPY      LASER ABLATION OF THE CERVIX  2008    PA ANTERIOR COLPORRAPHY RPR CYSTOCELE W/CYSTO N/A 7/14/2021    Procedure: Cystocele Repair, Sacrospinous Ligament fixation;  Surgeon: Meghna Masters MD;  Location: [facility];  Service: Gynecology    PA CYSTOURETHROSCOPY N/A 7/14/2021    Procedure: [place];  Surgeon: Meghna Masters MD;  Location: [facility];  Service: Gynecology    PA SLING OPERATION STRESS INCONTINENCE N/A 7/14/2021    Procedure: INSERTION PUBOVAGINAL SLING (FEMALE); Surgeon: Meghna Masters MD;  Location: [facility];  Service: Gynecology    THYROIDECTOMY         History reviewed. No pertinent family history. Medications have been verified.         Objective   /88 Pulse 88   Temp 98.2 °F (36.8 °C)   Resp 18   Ht 5' 5" (1.651 m)   Wt 63.5 kg (140 lb)   SpO2 99%   BMI 23.30 kg/m²   No LMP recorded. Patient is postmenopausal.       Physical Exam     Physical Exam  Vitals reviewed. Constitutional:       Appearance: Normal appearance. HENT:      Head:      Jaw: Tenderness and pain on movement present. Left Ear: A middle ear effusion is present. Lymphadenopathy:      Cervical: No cervical adenopathy. Neurological:      Mental Status: She is alert.

## 2024-02-13 ENCOUNTER — APPOINTMENT (EMERGENCY)
Dept: CT IMAGING | Facility: HOSPITAL | Age: 59
End: 2024-02-13
Payer: COMMERCIAL

## 2024-02-13 ENCOUNTER — HOSPITAL ENCOUNTER (OUTPATIENT)
Facility: HOSPITAL | Age: 59
Setting detail: OBSERVATION
Discharge: HOME | End: 2024-02-14
Attending: EMERGENCY MEDICINE | Admitting: INTERNAL MEDICINE
Payer: COMMERCIAL

## 2024-02-13 ENCOUNTER — APPOINTMENT (OUTPATIENT)
Dept: MRI IMAGING | Facility: HOSPITAL | Age: 59
End: 2024-02-13
Payer: COMMERCIAL

## 2024-02-13 DIAGNOSIS — G93.40 ACUTE ENCEPHALOPATHY: ICD-10-CM

## 2024-02-13 DIAGNOSIS — R29.90 STROKE-LIKE SYMPTOMS: Primary | ICD-10-CM

## 2024-02-13 DIAGNOSIS — R41.3 AMNESIA: ICD-10-CM

## 2024-02-13 DIAGNOSIS — I31.39 PERICARDIAL EFFUSION: ICD-10-CM

## 2024-02-13 DIAGNOSIS — F17.200 TOBACCO DEPENDENCE: ICD-10-CM

## 2024-02-13 DIAGNOSIS — I73.9 INTERMITTENT CLAUDICATION (HCC): ICD-10-CM

## 2024-02-13 PROBLEM — Q25.40 ABNORMALITY OF THORACIC AORTA: Status: ACTIVE | Noted: 2024-02-13

## 2024-02-13 PROBLEM — J06.9 URI (UPPER RESPIRATORY INFECTION): Status: ACTIVE | Noted: 2024-02-13

## 2024-02-13 LAB
ANION GAP SERPL CALCULATED.3IONS-SCNC: 6 MMOL/L
APTT PPP: 27 SECONDS (ref 23–37)
ATRIAL RATE: 77 BPM
BACTERIA UR QL AUTO: ABNORMAL /HPF
BILIRUB UR QL STRIP: NEGATIVE
BUN SERPL-MCNC: 11 MG/DL (ref 5–25)
CALCIUM SERPL-MCNC: 9.4 MG/DL (ref 8.4–10.2)
CARDIAC TROPONIN I PNL SERPL HS: 5 NG/L
CHLORIDE SERPL-SCNC: 106 MMOL/L (ref 96–108)
CLARITY UR: CLEAR
CO2 SERPL-SCNC: 27 MMOL/L (ref 21–32)
COLOR UR: YELLOW
CREAT SERPL-MCNC: 0.66 MG/DL (ref 0.6–1.3)
ERYTHROCYTE [DISTWIDTH] IN BLOOD BY AUTOMATED COUNT: 14.4 % (ref 11.6–15.1)
FLUAV RNA RESP QL NAA+PROBE: NEGATIVE
FLUBV RNA RESP QL NAA+PROBE: NEGATIVE
GFR SERPL CREATININE-BSD FRML MDRD: 97 ML/MIN/1.73SQ M
GLUCOSE SERPL-MCNC: 91 MG/DL (ref 65–140)
GLUCOSE SERPL-MCNC: 92 MG/DL (ref 65–140)
GLUCOSE UR STRIP-MCNC: NEGATIVE MG/DL
HCT VFR BLD AUTO: 42.7 % (ref 34.8–46.1)
HCV AB SER QL: NORMAL
HGB BLD-MCNC: 13.9 G/DL (ref 11.5–15.4)
HGB UR QL STRIP.AUTO: ABNORMAL
INR PPP: 0.96 (ref 0.84–1.19)
KETONES UR STRIP-MCNC: NEGATIVE MG/DL
LEUKOCYTE ESTERASE UR QL STRIP: NEGATIVE
MCH RBC QN AUTO: 28.5 PG (ref 26.8–34.3)
MCHC RBC AUTO-ENTMCNC: 32.6 G/DL (ref 31.4–37.4)
MCV RBC AUTO: 88 FL (ref 82–98)
NITRITE UR QL STRIP: NEGATIVE
NON-SQ EPI CELLS URNS QL MICRO: ABNORMAL /HPF
P AXIS: 74 DEGREES
PH UR STRIP.AUTO: 6 [PH] (ref 4.5–8)
PLATELET # BLD AUTO: 198 THOUSANDS/UL (ref 149–390)
PLATELET # BLD AUTO: 208 THOUSANDS/UL (ref 149–390)
PMV BLD AUTO: 10.9 FL (ref 8.9–12.7)
PMV BLD AUTO: 11.1 FL (ref 8.9–12.7)
POTASSIUM SERPL-SCNC: 3.9 MMOL/L (ref 3.5–5.3)
PR INTERVAL: 150 MS
PROT UR STRIP-MCNC: NEGATIVE MG/DL
PROTHROMBIN TIME: 12.9 SECONDS (ref 11.6–14.5)
QRS AXIS: 68 DEGREES
QRSD INTERVAL: 74 MS
QT INTERVAL: 382 MS
QTC INTERVAL: 432 MS
RBC # BLD AUTO: 4.88 MILLION/UL (ref 3.81–5.12)
RBC #/AREA URNS AUTO: ABNORMAL /HPF
RSV RNA RESP QL NAA+PROBE: NEGATIVE
SARS-COV-2 RNA RESP QL NAA+PROBE: NEGATIVE
SODIUM SERPL-SCNC: 139 MMOL/L (ref 135–147)
SP GR UR STRIP.AUTO: <=1.005 (ref 1–1.03)
T WAVE AXIS: 79 DEGREES
UROBILINOGEN UR QL STRIP.AUTO: 0.2 E.U./DL
VENTRICULAR RATE: 77 BPM
WBC # BLD AUTO: 8.52 THOUSAND/UL (ref 4.31–10.16)
WBC #/AREA URNS AUTO: ABNORMAL /HPF

## 2024-02-13 PROCEDURE — 0241U HB NFCT DS VIR RESP RNA 4 TRGT: CPT

## 2024-02-13 PROCEDURE — 85027 COMPLETE CBC AUTOMATED: CPT | Performed by: EMERGENCY MEDICINE

## 2024-02-13 PROCEDURE — 99285 EMERGENCY DEPT VISIT HI MDM: CPT | Performed by: EMERGENCY MEDICINE

## 2024-02-13 PROCEDURE — 85049 AUTOMATED PLATELET COUNT: CPT | Performed by: INTERNAL MEDICINE

## 2024-02-13 PROCEDURE — 86803 HEPATITIS C AB TEST: CPT | Performed by: INTERNAL MEDICINE

## 2024-02-13 PROCEDURE — 70496 CT ANGIOGRAPHY HEAD: CPT

## 2024-02-13 PROCEDURE — 99222 1ST HOSP IP/OBS MODERATE 55: CPT | Performed by: INTERNAL MEDICINE

## 2024-02-13 PROCEDURE — 99285 EMERGENCY DEPT VISIT HI MDM: CPT

## 2024-02-13 PROCEDURE — 93010 ELECTROCARDIOGRAM REPORT: CPT | Performed by: INTERNAL MEDICINE

## 2024-02-13 PROCEDURE — 80048 BASIC METABOLIC PNL TOTAL CA: CPT | Performed by: EMERGENCY MEDICINE

## 2024-02-13 PROCEDURE — 81001 URINALYSIS AUTO W/SCOPE: CPT

## 2024-02-13 PROCEDURE — 70498 CT ANGIOGRAPHY NECK: CPT

## 2024-02-13 PROCEDURE — 93005 ELECTROCARDIOGRAM TRACING: CPT

## 2024-02-13 PROCEDURE — 70551 MRI BRAIN STEM W/O DYE: CPT

## 2024-02-13 PROCEDURE — 84484 ASSAY OF TROPONIN QUANT: CPT | Performed by: EMERGENCY MEDICINE

## 2024-02-13 PROCEDURE — 36415 COLL VENOUS BLD VENIPUNCTURE: CPT | Performed by: EMERGENCY MEDICINE

## 2024-02-13 PROCEDURE — 99291 CRITICAL CARE FIRST HOUR: CPT | Performed by: PSYCHIATRY & NEUROLOGY

## 2024-02-13 PROCEDURE — 85730 THROMBOPLASTIN TIME PARTIAL: CPT | Performed by: EMERGENCY MEDICINE

## 2024-02-13 PROCEDURE — 85610 PROTHROMBIN TIME: CPT | Performed by: EMERGENCY MEDICINE

## 2024-02-13 PROCEDURE — 82948 REAGENT STRIP/BLOOD GLUCOSE: CPT

## 2024-02-13 RX ORDER — NICOTINE 21 MG/24HR
1 PATCH, TRANSDERMAL 24 HOURS TRANSDERMAL DAILY
Status: DISCONTINUED | OUTPATIENT
Start: 2024-02-13 | End: 2024-02-14 | Stop reason: HOSPADM

## 2024-02-13 RX ORDER — FLUTICASONE PROPIONATE 50 MCG
1 SPRAY, SUSPENSION (ML) NASAL DAILY
Status: DISCONTINUED | OUTPATIENT
Start: 2024-02-13 | End: 2024-02-14 | Stop reason: HOSPADM

## 2024-02-13 RX ORDER — LORAZEPAM 2 MG/ML
1 INJECTION INTRAMUSCULAR ONCE
Status: COMPLETED | OUTPATIENT
Start: 2024-02-13 | End: 2024-02-13

## 2024-02-13 RX ORDER — ENOXAPARIN SODIUM 100 MG/ML
40 INJECTION SUBCUTANEOUS DAILY
Status: DISCONTINUED | OUTPATIENT
Start: 2024-02-13 | End: 2024-02-14 | Stop reason: HOSPADM

## 2024-02-13 RX ORDER — ONDANSETRON 2 MG/ML
4 INJECTION INTRAMUSCULAR; INTRAVENOUS EVERY 6 HOURS PRN
Status: DISCONTINUED | OUTPATIENT
Start: 2024-02-13 | End: 2024-02-14 | Stop reason: HOSPADM

## 2024-02-13 RX ORDER — ATORVASTATIN CALCIUM 40 MG/1
40 TABLET, FILM COATED ORAL EVERY EVENING
Status: DISCONTINUED | OUTPATIENT
Start: 2024-02-13 | End: 2024-02-14 | Stop reason: HOSPADM

## 2024-02-13 RX ORDER — ASPIRIN 81 MG/1
81 TABLET, CHEWABLE ORAL DAILY
Status: DISCONTINUED | OUTPATIENT
Start: 2024-02-13 | End: 2024-02-14 | Stop reason: HOSPADM

## 2024-02-13 RX ORDER — LEVOTHYROXINE SODIUM 0.1 MG/1
100 TABLET ORAL
Status: DISCONTINUED | OUTPATIENT
Start: 2024-02-14 | End: 2024-02-14 | Stop reason: HOSPADM

## 2024-02-13 RX ORDER — ACETAMINOPHEN 325 MG/1
650 TABLET ORAL EVERY 4 HOURS PRN
Status: DISCONTINUED | OUTPATIENT
Start: 2024-02-13 | End: 2024-02-14 | Stop reason: HOSPADM

## 2024-02-13 RX ORDER — ECHINACEA PURPUREA EXTRACT 125 MG
1 TABLET ORAL
Status: DISCONTINUED | OUTPATIENT
Start: 2024-02-13 | End: 2024-02-14 | Stop reason: HOSPADM

## 2024-02-13 RX ORDER — LANOLIN ALCOHOL/MO/W.PET/CERES
400 CREAM (GRAM) TOPICAL DAILY
Status: DISCONTINUED | OUTPATIENT
Start: 2024-02-13 | End: 2024-02-14 | Stop reason: HOSPADM

## 2024-02-13 RX ADMIN — ATORVASTATIN CALCIUM 40 MG: 40 TABLET, FILM COATED ORAL at 18:27

## 2024-02-13 RX ADMIN — IOHEXOL 85 ML: 350 INJECTION, SOLUTION INTRAVENOUS at 13:25

## 2024-02-13 RX ADMIN — SALINE NASAL SPRAY 1 SPRAY: 1.5 SOLUTION NASAL at 20:27

## 2024-02-13 RX ADMIN — ENOXAPARIN SODIUM 40 MG: 40 INJECTION SUBCUTANEOUS at 16:20

## 2024-02-13 RX ADMIN — ASPIRIN 81 MG CHEWABLE TABLET 81 MG: 81 TABLET CHEWABLE at 16:20

## 2024-02-13 RX ADMIN — Medication 1 PATCH: at 16:20

## 2024-02-13 RX ADMIN — FLUTICASONE PROPIONATE 1 SPRAY: 50 SPRAY, METERED NASAL at 20:28

## 2024-02-13 RX ADMIN — SALINE NASAL SPRAY 1 SPRAY: 1.5 SOLUTION NASAL at 20:28

## 2024-02-13 RX ADMIN — SALINE NASAL SPRAY 1 SPRAY: 1.5 SOLUTION NASAL at 20:26

## 2024-02-13 RX ADMIN — LORAZEPAM 1 MG: 2 INJECTION, SOLUTION INTRAMUSCULAR; INTRAVENOUS at 16:27

## 2024-02-13 RX ADMIN — Medication 400 MG: at 16:20

## 2024-02-13 NOTE — ASSESSMENT & PLAN NOTE
She had been in her normal state of health except for compliant of uri symptoms in which she took nyquil  She woke up without any issues this am and then around 10am she became confused with slurred speech  Stroke alert in ed was called  Cta head and neck and ct head were negative  She continues to have waxing and waning with her confusion  Possibly related to nyquil. Could consider EEG   Ua negative  Covid/flu/rsv pending  Appreciate neurology recommendations  Stroke pathway  Mri brain  Echo  Asa, statin   Neuro check

## 2024-02-13 NOTE — ED PROVIDER NOTES
Chief Complaint   Patient presents with    Altered Mental Status     Per EMS, pts daughter called due to increased confusion; pt not remembering things she normalled would.  Per EMS, pt asked same question multiple times on ride to ED.  Pt denies noticing any change to self.       History of Present Illness and Review of Systems   This is a 58 y.o. female with PMH significant for hypothyroidism coming in today with complaint of confusion.  The patient's daughter provides primary history.  She reports that sometime this morning, the patient began having repetitive phrasing and confusion.  Reportedly misstated the day of the week and, was unsure of her daughter's name or cousin's birthday.  She never had symptoms like this previously.  Recently recovered from some URI symptoms and took Nyquil last night. It appears she was last normal last night around 11p, and then became confused around 10A today.  Denies any traumatic head injuries or otherwise.  No anticoagulant or antiplatelet use.  No complaints of chest pain abdominal pain urinary symptoms.  Denies any difficulty speaking, swallowing, ambulating.  She reportedly shoveled snow without difficulty prior to arrival. No other symptoms currently.    - No language barrier.     No other complaints for this encounter.    Remainder of ROS Reviewed and Non-Pertinent    Past Medical, Past Surgical History:    has a past medical history of Colon polyp, Disease of thyroid gland, and Urinary incontinence.   has a past surgical history that includes Thyroidectomy; Laser ablation of the cervix (2008); Colonoscopy; pr anterior colporraphy rpr cystocele w/cysto (N/A, 7/14/2021); pr sling operation stress incontinence (N/A, 7/14/2021); and pr cystourethroscopy (N/A, 7/14/2021).     Allergies:     Allergies   Allergen Reactions    Morphine Palpitations and Other (See Comments)     And itching       Social and Family History:     Social History     Substance and Sexual Activity    Alcohol Use Yes    Comment: occasionally     Social History     Tobacco Use   Smoking Status Every Day    Current packs/day: 0.50    Types: Cigarettes   Smokeless Tobacco Never     Social History     Substance and Sexual Activity   Drug Use Not Currently       Physical Examination     Vitals:    02/13/24 1345 02/13/24 1400 02/13/24 1445 02/13/24 1500   BP: 155/79 121/77 132/81 125/70   BP Location:       Pulse: 75 72 75 73   Resp: 17 20 20 20   Temp:       TempSrc:       SpO2:  98% 97% 97%   Weight:       Height:           Physical Exam  Vitals and nursing note reviewed.   Constitutional:       General: She is not in acute distress.     Appearance: She is well-developed.   HENT:      Head: Normocephalic and atraumatic.   Eyes:      General: No visual field deficit.     Extraocular Movements: Extraocular movements intact.      Conjunctiva/sclera: Conjunctivae normal.      Pupils: Pupils are equal, round, and reactive to light.   Cardiovascular:      Rate and Rhythm: Normal rate and regular rhythm.      Heart sounds: No murmur heard.  Pulmonary:      Effort: Pulmonary effort is normal. No respiratory distress.      Breath sounds: Normal breath sounds.   Abdominal:      Palpations: Abdomen is soft.      Tenderness: There is no abdominal tenderness.   Musculoskeletal:         General: No swelling.      Cervical back: Normal range of motion and neck supple.   Skin:     General: Skin is warm and dry.      Capillary Refill: Capillary refill takes less than 2 seconds.   Neurological:      Mental Status: She is alert. She is disoriented and confused.      Cranial Nerves: No cranial nerve deficit, dysarthria or facial asymmetry.      Sensory: No sensory deficit.   Psychiatric:         Mood and Affect: Mood normal.         Behavior: Behavior normal.           Procedures   Procedures      MDM:   Medical Decision Making  Wilma Rowe is a 58 y.o. who presents with complaints of confusion    Vital signs are stable, physical  exam shows the patient is disoriented to time and place, able to state her age and birthday.  No focal neurodeficits.  No EOM or cranial nerve abnormalities.  No evidence of trauma.  Otherwise appears at her baseline.    Ddx: Clinically concerning for TIA versus CVA.  May be related to toxic metabolic encephalopathy.  Will evaluate for atrial fibrillation as well.  Given her timing of onset, last known well at 11 PM yesterday, will obtain stroke pathway workup.     Plan: Workup will include Stroke pathway, neurology consultation. Will monitor closely and reassess.    Reassessment/Disposition: CTA notable for ectasia of aorta which I relayed the results to the patient and her daughter.  Otherwise negative for acute LVO or otherwise.  Ultimately recommended admission for MRI and further testing.    Discussed the patient's case with the [facility] service who agreed to admit the patient for further care and evaluation. The patient was stable throughout their ED course and admitted without complication while under my care.        Amount and/or Complexity of Data Reviewed  Labs: ordered. Decision-making details documented in ED Course.  Radiology: ordered.    Risk  Prescription drug management.  Decision regarding hospitalization.        - Reviewed relevant past office visits/hospitalizations/procedures  -Obtained pertinent history that influenced decision making from the family    ED Course as of 02/13/24 1504   Tue Feb 13, 2024   1401 Leukocytes, UA: Negative   1401 Nitrite, UA: Negative      Final Dispo   Final Diagnosis:  1. Stroke-like symptoms    2. Amnesia    3. Acute encephalopathy      Time reflects when diagnosis was documented in both MDM as applicable and the Disposition within this note       Time User Action Codes Description Comment    2/13/2024  1:18 PM Frances Gale Add [R29.90] Stroke-like symptoms     2/13/2024  2:12 PM Fernando Oconnell Add [R41.3] Amnesia     2/13/2024  2:57 PM Tori Lewis Add [G93.40]  Acute encephalopathy           ED Disposition       ED Disposition   Admit    Condition   Stable    Date/Time   Tue Feb 13, 2024  2:12 PM    Comment   Case was discussed with PARMINDER and the patient's admission status was agreed to be Admission Status: observation status to the service of Dr. Lewis .               Follow-up Information    None       Medications   iohexol (OMNIPAQUE) 350 MG/ML injection (SINGLE-DOSE) 85 mL (85 mL Intravenous Given 2/13/24 1325)       Risk Stratification Tools                Orders Placed This Encounter   Procedures    FLU/RSV/COVID - if FLU/RSV clinically relevant    CT stroke alert brain    CTA stroke alert (head/neck)    Basic metabolic panel    HS Troponin 0hr (reflex protocol)    CBC and Platelet    Protime-INR    APTT    HS Troponin I 2hr    HS Troponin I 4hr    Urine Microscopic    Baseline NIH stroke scale    Fingerstick Glucose (POCT)    Neurological checks    Vital signs every 15 minutes    Pulse Oximetry, Continuous    Urine dip analyzer    Inpatient consult to Neurology    ECG 12 lead    ECG 12 lead    Place in Observation       Labs:     Labs Reviewed   URINE MICROSCOPIC - Abnormal       Result Value Ref Range Status    RBC, UA 4-10 (*) None Seen, 1-2 /hpf Final    WBC, UA None Seen  None Seen, 1-2 /hpf Final    Epithelial Cells Occasional  None Seen, Occasional /hpf Final    Bacteria, UA None Seen  None Seen, Occasional /hpf Final   URINE MACROSCOPIC, POC - Abnormal    Color, UA Yellow   Final    Clarity, UA Clear   Final    pH, UA 6.0  4.5 - 8.0 Final    Leukocytes, UA Negative  Negative Final    Nitrite, UA Negative  Negative Final    Protein, UA Negative  Negative mg/dl Final    Glucose, UA Negative  Negative mg/dl Final    Ketones, UA Negative  Negative mg/dl Final    Urobilinogen, UA 0.2  0.2, 1.0 E.U./dl E.U./dl Final    Bilirubin, UA Negative  Negative Final    Occult Blood, UA Large (*) Negative Final    Specific Gravity, UA <=1.005  1.003 - 1.030 Final     Narrative:     CLINITEK RESULT   COVID19, INFLUENZA A/B, RSV PCR, [facility] - Normal    SARS-CoV-2 Negative  Negative Final    Comment:      INFLUENZA A PCR Negative  Negative Final    Comment:      INFLUENZA B PCR Negative  Negative Final    Comment:      RSV PCR Negative  Negative Final    Comment:      Narrative:     FOR PEDIATRIC PATIENTS - copy/paste COVID Guidelines URL to browser: [email or web address]    SARS-CoV-2 assay is a Nucleic Acid Amplification assay intended for the  qualitative detection of nucleic acid from SARS-CoV-2 in nasopharyngeal  swabs. Results are for the presumptive identification of SARS-CoV-2 RNA.    Positive results are indicative of infection with SARS-CoV-2, the virus  causing COVID-19, but do not rule out bacterial infection or co-infection  with other viruses. Laboratories within the [place] and its  territories are required to report all positive results to the appropriate  public health authorities. Negative results do not preclude SARS-CoV-2  infection and should not be used as the sole basis for treatment or other  patient management decisions. Negative results must be combined with  clinical observations, patient history, and epidemiological information.  This test has not been FDA cleared or approved.    This test has been authorized by FDA under an Emergency Use Authorization  (EUA). This test is only authorized for the duration of time the  declaration that circumstances exist justifying the authorization of the  emergency use of an in vitro diagnostic tests for detection of SARS-CoV-2  virus and/or diagnosis of COVID-19 infection under section 564(b)(1) of  the Act, 21 U.S.C. 360bbb-3(b)(1), unless the authorization is terminated  or revoked sooner. The test has been validated but independent review by FDA  and CLIA is pending.    Test performed using [employer]: This RT-PCR assay targets N2,  a region unique to  "SARS-CoV-2. A conserved region in the E-gene was chosen  for pan-Sarbecovirus detection which includes SARS-CoV-2.    According to CMS-2020-01-R, this platform meets the definition of high-throughput technology.   HS TROPONIN I 0HR - Normal    hs TnI 0hr 5  \"Refer to ACS Flowchart\"- see link ng/L Final    Comment:                                              Initial (time 0) result  If >=50 ng/L, Myocardial injury suggested ;  Type of myocardial injury and treatment strategy  to be determined.  If 5-49 ng/L, a delta result at 2 hours and or 4 hours will be needed to further evaluate.  If <4 ng/L, and chest pain has been >3 hours since onset, patient may qualify for discharge based on the HEART score in the ED.  If <5 ng/L and <3hours since onset of chest pain, a delta result at 2 hours will be needed to further evaluate.    HS Troponin 99th Percentile URL of a Health Population=12 ng/L with a 95% Confidence Interval of 8-18 ng/L.    Second Troponin (time 2 hours)  If calculated delta >= 20 ng/L,  Myocardial injury suggested ; Type of myocardial injury and treatment strategy to be determined.  If 5-49 ng/L and the calculated delta is 5-19 ng/L, consult medical service for evaluation.  Continue evaluation for ischemia on ecg and other possible etiology and repeat hs troponin at 4 hours.  If delta is <5 ng/L at 2 hours, consider discharge based on risk stratification via the HEART score (if in ED), or LACY risk score in IP/Observation.    HS Troponin 99th Percentile URL of a Health Population=12 ng/L with a 95% Confidence Interval of 8-18 ng/L.   CBC AND PLATELET - Normal    WBC 8.52  4.31 - 10.16 Thousand/uL Final    RBC 4.88  3.81 - 5.12 Million/uL Final    Hemoglobin 13.9  11.5 - 15.4 g/dL Final    Hematocrit 42.7  34.8 - 46.1 % Final    MCV 88  82 - 98 fL Final    MCH 28.5  26.8 - 34.3 pg Final    MCHC 32.6  31.4 - 37.4 g/dL Final    RDW 14.4  11.6 - 15.1 % Final    Platelets 208  149 - 390 Thousands/uL Final    " MPV 11.1  8.9 - 12.7 fL Final   PROTIME-INR - Normal    Protime 12.9  11.6 - 14.5 seconds Final    INR 0.96  0.84 - 1.19 Final   APTT - Normal    PTT 27  23 - 37 seconds Final    Comment: Therapeutic Heparin Range =  60-90 seconds   POCT GLUCOSE - Normal    POC Glucose 92  65 - 140 mg/dl Final   BASIC METABOLIC PANEL    Sodium 139  135 - 147 mmol/L Final    Potassium 3.9  3.5 - 5.3 mmol/L Final    Chloride 106  96 - 108 mmol/L Final    CO2 27  21 - 32 mmol/L Final    ANION GAP 6  mmol/L Final    BUN 11  5 - 25 mg/dL Final    Creatinine 0.66  0.60 - 1.30 mg/dL Final    Comment: Standardized to IDMS reference method    Glucose 91  65 - 140 mg/dL Final    Comment: If the patient is fasting, the ADA then defines impaired fasting glucose as > 100 mg/dL and diabetes as > or equal to 123 mg/dL.    Calcium 9.4  8.4 - 10.2 mg/dL Final    eGFR 97  ml/min/1.73sq m Final    Narrative:     National Kidney Disease Foundation guidelines for Chronic Kidney Disease (CKD):     Stage 1 with normal or high GFR (GFR > 90 mL/min/1.73 square meters)    Stage 2 Mild CKD (GFR = 60-89 mL/min/1.73 square meters)    Stage 3A Moderate CKD (GFR = 45-59 mL/min/1.73 square meters)    Stage 3B Moderate CKD (GFR = 30-44 mL/min/1.73 square meters)    Stage 4 Severe CKD (GFR = 15-29 mL/min/1.73 square meters)    Stage 5 End Stage CKD (GFR <15 mL/min/1.73 square meters)  Note: GFR calculation is accurate only with a steady state creatinine   HS TROPONIN I 2HR       Imaging:     CTA stroke alert (head/neck)   Final Result by Félix Harrell MD (02/13 9782)      Negative CTA head and neck for large vessel occlusion, dissection, aneurysm, or high-grade stenosis.      4.0 cm ectasia of ascending thoracic aorta. Recommend follow-up CT chest in 12 months.      Additional chronic/incidental findings as detailed above.            Findings were directly discussed with Tanesha Purdy at approximately 1:28 PM.                        Workstation performed:  "[id number]         CT stroke alert brain   Final Result by Félix Harrell MD (02/13 1330)      No acute intracranial abnormality given beam-hardening streak artifact in the lonnie.      Findings were directly discussed with Tanesha Purdy at approximately 1:28 PM.      Workstation performed: [id number]            All details of the evaluation and treatment plan were made clear and additionally all questions and concerns were addressed while under my care.    Portions of the record may have been created with voice recognition software. Occasional wrong word or \"sound a like\" substitutions may have occurred due to the inherent limitations of voice recognition software. Read the chart carefully and recognize, using context, where substitutions have occurred.    The attending physician physically available and evaluated the above patient alongside myself.      Fernando Oconnell MD  02/13/24 4845    "

## 2024-02-13 NOTE — CONSULTS
Consultation - Neurology   Wilma Rowe 58 y.o. female MRN: [id number]  Unit/Bed#: ED-15 Encounter: [id number]    Assessment/Plan   Stroke-like symptoms  Assessment & Plan  58 year old female with past medical hx as listed below, the patient presents with acute confusion, otherwise non focal examination.  NIH is a zero.  CT of head with no acute intracranial abnormality, CTA of head and neck negative for large vessel  occlusion or significant stenosis.   The patient is not a tnk or thrombectomy candidate.     DDX - suspect TME, HTN related, no evidence to suspect seizure, rule out small ischemic stroke, rule out TGA.     - Stroke pathway  MRI brain with out contrast.   Echo  Lipid Panel  Hemoglobin A1c  Aspirin 81 mg   Atorvastatin 40 mg  Tobacco cessation  Continue telemetry  PT/OT/ST  Frequent neuro checks. Continue to monitor and notify neurology with any changes.   - Medical management and supportive care per primary team. Correction of any metabolic or infectious disturbances.    - Reviewed case with attending neurologist      History of Present Illness     Reason for Consult / Principal Problem:   Confusion, acute     HPI: Wilma Rowe is a 58 y.o. with past medical hx as listed below, no prior neurological hx.  The patient went to bed last night nl state of health, she did have an upper respiratory infectious complaints prior to bed and was given Nyquil.  The patient awoken this a.m., she was normal state of health.  She was last seen normal at 10 a.m., family noted at 11:30 a.m. that the patient was acutely confused.  She was having problems remembering she was at a [employer] bowl party a few days ago, she could not remember the day or month, she was unable to tell where there sister lives.   The patient took her Nyquil last night at 11 p.m.  They also report she was slurring her speech.  There was no problems with her vision, no one facial droop, no one sided weakness, no numbness, no ataxia.  She has no  aphasia or dysarthria.  She continues to be confused.  Not repetitive speech.   Blood pressure on arrival was 160/105 mmhg.     A stroke alert was activated.   NIH was a zero on arrival.   CT of head - no acute intracranial abnormality.  CTA of head and neck - negative CTA of head and neck for large vessel occlusion, dissection, aneurysm, or stenosis.     The patient was not a tnk or thrombectomy candidate.     The patient current denies headache, denies neck stiffness, denies new problems with vision (she has chronic vision loss), no facial droop, no one sided weakness or numbness, no ataxia, no problems with speech.     Neurology arrival time with in 5 minutes.  NIH is a zero.  Not a tnk or thrombectomy candidate.       Inpatient consult to Neurology  Consult performed by: Jeremy Lee PA-C  Consult ordered by: Frances Gale DO        Review of Systems  12 point ROS as per HPI.    Historical Information   Past Medical History:   Diagnosis Date    Colon polyp     Disease of thyroid gland     Urinary incontinence      Past Surgical History:   Procedure Laterality Date    COLONOSCOPY      LASER ABLATION OF THE CERVIX  2008    WI ANTERIOR COLPORRAPHY RPR CYSTOCELE W/CYSTO N/A 7/14/2021    Procedure: Cystocele Repair, Sacrospinous Ligament fixation;  Surgeon: Carlos Sharp MD;  Location: [facility];  Service: Gynecology    WI CYSTOURETHROSCOPY N/A 7/14/2021    Procedure: CYSTOSCOPY;  Surgeon: Carlos Sharp MD;  Location: [facility];  Service: Gynecology    WI SLING OPERATION STRESS INCONTINENCE N/A 7/14/2021    Procedure: INSERTION PUBOVAGINAL SLING (FEMALE);  Surgeon: Carlos Sharp MD;  Location: [facility];  Service: Gynecology    THYROIDECTOMY       Social History   Social History     Substance and Sexual Activity   Alcohol Use Yes    Comment: occasionally     Social History     Substance and Sexual Activity   Drug Use Not Currently     E-Cigarette/Vaping    E-Cigarette Use Never User   "    E-Cigarette/Vaping Substances    Nicotine No     THC No     CBD No     Flavoring No     Other No     Unknown No      Social History     Tobacco Use   Smoking Status Every Day    Current packs/day: 0.50    Types: Cigarettes   Smokeless Tobacco Never     Family History: History reviewed. No pertinent family history.    Meds/Allergies   all current active meds have been reviewed, current meds:   No current facility-administered medications for this encounter.   , and PTA meds:   Prior to Admission Medications   Prescriptions Last Dose Informant Patient Reported? Taking?   Magnesium 500 MG CAPS Unknown  Yes No   Sig: Take by mouth daily at bedtime   Mirabegron ER 25 MG TB24 Unknown  No No   Sig: Take 25 mg by mouth in the morning   Patient not taking: Reported on 2023   Multiple Vitamins-Minerals (ALIVE MULTI-VITAMIN PO)   Yes Yes   Sig: Take by mouth daily at bedtime   TURMERIC PO   Yes Yes   Sig: Take by mouth daily at bedtime   fluticasone (FLONASE) 50 mcg/act nasal spray Unknown  No No   Si spray into each nostril daily   levothyroxine 100 mcg tablet   Yes Yes   Sig: Take 100 mcg by mouth daily   naproxen (NAPROSYN) 500 mg tablet Unknown  No No   Sig: Take 1 tablet (500 mg total) by mouth 2 (two) times a day with meals      Facility-Administered Medications: None     Allergies   Allergen Reactions    Morphine Palpitations and Other (See Comments)     And itching     Objective   Vitals:Blood pressure 121/77, pulse 72, temperature 98.2 °F (36.8 °C), temperature source Oral, resp. rate 20, height 5' 4\" (1.626 m), weight 66.8 kg (147 lb 4.3 oz), SpO2 98%.,Body mass index is 25.28 kg/m².  No intake or output data in the 24 hours ending 24 1436    Invasive Devices:   Invasive Devices       Peripheral Intravenous Line  Duration             Peripheral IV 24 Right Antecubital <1 day                  Vital Sign reviewed  Constitutional - in NAD  HEENT - NC/AT, no icterus noted, conjunctiva clear, " oral mucosa free of exudate  Cardiac - rate regular  Lungs - no respiratory distress noted.   Abdomen - Soft and non tender, non distended  Extremities - No edema noted  Skin - no rashes noted  Neurological  Mental status - the patient is awake alert oriented x 2, not time/year or president, with no evidence of aphasia or dysarthria, patient is able to follow simple commands, is able to follow complex commands.  No para-phasic errors noted.   She was able to repeat, recognize objects, she was able to repeat and read.  She was able to complete calculations.   Cranial nerves 2 through 12 are intact  Motor - 5/5 upper extremities and lower extremities, without drift, normal tone and bulk.  No drift in the upper or lowers, non focal motor examination.   No tremor or fixed deficit noted  Rapid movements are equal bilaterally  Sensation - non-focal to touch, no neglect.   Coordination -  no ataxia noted on finger-to-nose   Toes are down-going bilaterally  No evidence of clonus or myoclonus or tremor.  No evidence of seizure activity, observed.      NIH is a zero, on my examination.    Lab Results: I have personally reviewed pertinent reports.  , CBC:   Results from last 7 days   Lab Units 02/13/24  1319   WBC Thousand/uL 8.52   RBC Million/uL 4.88   HEMOGLOBIN g/dL 13.9   HEMATOCRIT % 42.7   MCV fL 88   PLATELETS Thousands/uL 208   , BMP/CMP:   Results from last 7 days   Lab Units 02/13/24  1319   SODIUM mmol/L 139   POTASSIUM mmol/L 3.9   CHLORIDE mmol/L 106   CO2 mmol/L 27   BUN mg/dL 11   CREATININE mg/dL 0.66   CALCIUM mg/dL 9.4   EGFR ml/min/1.73sq m 97   , Vitamin B12:   , HgBA1C:   , TSH:   , Coagulation:   Results from last 7 days   Lab Units 02/13/24  1319   INR  0.96   , Lipid Profile:   , Ammonia:   , Urinalysis:   Results from last 7 days   Lab Units 02/13/24  1358   COLOR UA  Yellow   CLARITY UA  Clear   SPEC GRAV UA  <=1.005   PH UA  6.0   LEUKOCYTES UA  Negative   NITRITE UA  Negative   GLUCOSE UA mg/dl  "Negative   KETONES UA mg/dl Negative   BILIRUBIN UA  Negative   BLOOD UA  Large*   , Drug Screen:   , Medication Drug Levels:       Invalid input(s): \"CARBAMAZEPINE\", \"LACOSAMIDE\", \"OXCARBAZEPINE\"    Procedure: CTA stroke alert (head/neck)    Result Date: 2/13/2024  Narrative: CTA NECK AND BRAIN WITH CONTRAST INDICATION: Stroke Alert COMPARISON:   Same day CT stroke alert brain. CT head and CT cervical spine without contrast 5/28/2022. TECHNIQUE:   Post contrast imaging was performed after administration of iodinated contrast through the neck and brain. Post contrast axial 0.625 mm images timed to opacify the arterial system. 3D rendering was performed on an independent workstation.   MIP reconstructions performed. Coronal reconstructions were performed of the noncontrast portion of the brain. Radiation dose length product (DLP) for this visit:  685 mGy-cm .  This examination, like all CT scans performed in the [facility], was performed utilizing techniques to minimize radiation dose exposure, including the use of iterative reconstruction and automated exposure control. IV Contrast:  85 mL of iohexol (OMNIPAQUE) IMAGE QUALITY:   Diagnostic FINDINGS: CERVICAL VASCULATURE AORTIC ARCH AND GREAT VESSELS: 4.0 cm ectasia of ascending thoracic aorta (2:302). Minor calcified atherosclerotic disease of aortic arch. Normal great vessel origins. Normal visualized subclavian vessels. RIGHT VERTEBRAL ARTERY CERVICAL SEGMENT:  Normal origin. The vessel is mildly hypoplastic in caliber throughout the neck. LEFT VERTEBRAL ARTERY CERVICAL SEGMENT:  Normal origin. The vessel is normal and dominant in caliber throughout the neck. RIGHT EXTRACRANIAL CAROTID SEGMENT:  Normal caliber common carotid artery is patent given motion artifact in the mid-to-lower neck.  Normal bifurcation and cervical internal carotid artery.  No stenosis or dissection given motion artifact. LEFT EXTRACRANIAL CAROTID SEGMENT:  Normal caliber " common carotid artery is patent given motion artifact in the mid-to-lower neck.  Normal bifurcation and cervical internal carotid artery.  No stenosis or dissection given motion artifact. NASCET criteria was used to determine the degree of internal carotid artery diameter stenosis. INTRACRANIAL VASCULATURE INTERNAL CAROTID ARTERIES:  Normal enhancement of the intracranial portions of the internal carotid arteries.  Normal ophthalmic artery origins.  Normal ICA terminus. ANTERIOR CIRCULATION: Aplastic right A1 segment, normal variant. Normal caliber left A1 segment. Anterior cerebral arteries with normal enhancement.  Normal anterior communicating artery. MIDDLE CEREBRAL ARTERY CIRCULATION:  M1 segment and middle cerebral artery branches demonstrate normal enhancement bilaterally. DISTAL VERTEBRAL ARTERIES: Patent mildly hypoplastic right and dominant left distal vertebral arteries. Mildly hypoplastic right vertebral artery becomes more diminutive after PICA takeoff. Posterior inferior cerebellar artery origins are normal. Normal vertebral basilar junction. BASILAR ARTERY:  Basilar artery is normal in caliber.  Normal superior cerebellar arteries. POSTERIOR CEREBRAL ARTERIES: Both posterior cerebral arteries arises from the basilar tip.  Both arteries demonstrate normal enhancement. VENOUS STRUCTURES:  Normal. NON VASCULAR ANATOMY BONY STRUCTURES:  No acute osseous abnormality. 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 Multilevel degenerative changes, moderate at C5-C6. SOFT TISSUES OF THE NECK: Postsurgical changes of right hemithyroidectomy. 0.7 cm hypodense nodule left thyroid lobe. Incidental discovery of one or more thyroid nodule(s) measuring less than 1.5 cm and without suspicious features is noted in this patient who is above 35 years old; according to guidelines published in the February 2015 white paper on incidental thyroid nodules in the Journal of the American College of  Radiology (JACR), no further evaluation is recommended. THORACIC INLET: Mild biapical fibrotic change. No focal consolidation visualized upper lung zone. Tiny calcified granuloma left upper lobe. 4.0 cm ectasia of ascending thoracic aorta (2:302).     Impression: Negative CTA head and neck for large vessel occlusion, dissection, aneurysm, or high-grade stenosis. 4.0 cm ectasia of ascending thoracic aorta. Recommend follow-up CT chest in 12 months. Additional chronic/incidental findings as detailed above. Findings were directly discussed with Tanesha Purdy at approximately 1:28 PM. Workstation performed: [id number]     Procedure: CT stroke alert brain    Result Date: 2/13/2024  Narrative: CT BRAIN - STROKE ALERT PROTOCOL INDICATION:   Stroke Alert. COMPARISON: Pending same day CTA stroke alert head and neck. CT head without contrast 5/28/2022. TECHNIQUE:  CT examination of the brain was performed.  In addition to axial images, coronal reformatted images were created and submitted for interpretation. Radiation dose length product (DLP) for this visit:  903 mGy-cm .  This examination, like all CT scans performed in the [facility], was performed utilizing techniques to minimize radiation dose exposure, including the use of iterative reconstruction and automated exposure control. IMAGE QUALITY:  Diagnostic. Beam-hardening streak artifact in the lonnie. FINDINGS: PARENCHYMA:  No intracranial mass, mass effect or midline shift. No CT signs of acute infarction given beam-hardening streak artifact in the lonine.  No acute parenchymal hemorrhage. Mild calcification of the cavernous internal carotid and left intradural vertebral arteries. VENTRICLES AND EXTRA-AXIAL SPACES:  Normal for the patient's age. VISUALIZED ORBITS: Normal visualized orbits. PARANASAL SINUSES: Normal visualized paranasal sinuses. CALVARIUM AND EXTRACRANIAL SOFT TISSUES:   Normal. OTHER: Incomplete osseous fusion of posterior C1 ring,  congenital variant.     Impression: No acute intracranial abnormality given beam-hardening streak artifact in the lonnie. Findings were directly discussed with Tanesha Purdy at approximately 1:28 PM. Workstation performed: [id number]       Imaging Studies: I have personally reviewed pertinent reports.    EKG, Pathology, and Other Studies: I have personally reviewed pertinent reports.      Code Status: No Order    Counseling / Coordination of Care  Reviewed case with neurology attending, history and physical examination, labs and imaging completed, plan of care as per attending physician.  Please see attestation for further details.     Critical care time total 45 minutes, reviewing imaging, physical examination and critical decision making.

## 2024-02-13 NOTE — ED ATTENDING ATTESTATION
2/13/2024  I, Frances Gale DO, saw and evaluated the patient. I have discussed the patient with the resident/non-physician practitioner and agree with the resident's/non-physician practitioner's findings, Plan of Care, and MDM as documented in the resident's/non-physician practitioner's note, except where noted. All available labs and Radiology studies were reviewed.  I was present for key portions of any procedure(s) performed by the resident/non-physician practitioner and I was immediately available to provide assistance.       At this point I agree with the current assessment done in the Emergency Department.  I have conducted an independent evaluation of this patient a history and physical is as follows:    59 yo F presenting for evaluation of confusion/difficulty remembering things.   She reports that sx started this morning with repetitive speech, forgetting birthdays/dates.    Denies visual changes, numbness, weakness, HA, CP, fevers, chills, etc.    Stroke alert called. Stroke workup/neuro cs/admit    ED Course         Critical Care Time  Procedures

## 2024-02-13 NOTE — ASSESSMENT & PLAN NOTE
58 year old female with past medical hx as listed below, the patient presents with acute confusion, otherwise non focal examination.  NIH is a zero.  CT of head with no acute intracranial abnormality, CTA of head and neck negative for large vessel  occlusion or significant stenosis.   The patient is not a tnk or thrombectomy candidate.  MRI of brain with out contrast - no evidence of acute infarct, ICH or mass.  LDL was 69, A1c pending.  Metabolic and infectious work up has been unrevealing at this time.     Suspect TGA    - Stroke pathway  Echo pending  Lipid Panel - LDL 69  Hemoglobin A1c  Aspirin 81 mg (takes at home, no need from neuroperspective)  No need for statin per neurological stand point  Tobacco cessation  Continue telemetry  PT/OT/ST  Frequent neuro checks. Continue to monitor and notify neurology with any changes.   - Medical management and supportive care per primary team. Correction of any metabolic or infectious disturbances.    - Reviewed case with attending neurologist

## 2024-02-13 NOTE — PROGRESS NOTES
Pastoral Care Progress Note    2024  Patient: Wilma Rowe : 1965  Admission Date & Time: 2024 1253  MRN: [id number] [facility]: [id number]         24 1300   Clinical Encounter Type   Visited With Patient and family together      responded to [place] Stroke Alert.  Spoke with daughter while mother was getting a CT Scan.  Stayed with daughter and patient in ED room while patient was being examined.  is available upon request.

## 2024-02-13 NOTE — H&P
[facility]  H&P  Name: Wilma Rowe 58 y.o. female I MRN: [id number]  Unit/Bed#: ED-15 I Date of Admission: 2/13/2024   Date of Service: 2/13/2024 I Hospital Day: 0      Assessment/Plan   * Acute encephalopathy  Assessment & Plan  She had been in her normal state of health except for compliant of uri symptoms in which she took nyquil  She woke up without any issues this am and then around 10am she became confused with slurred speech  Stroke alert in ed was called  Cta head and neck and ct head were negative  She continues to have waxing and waning with her confusion  Possibly related to nyquil. Could consider EEG   Ua negative  Covid/flu/rsv pending  Appreciate neurology recommendations  Stroke pathway  Mri brain  Echo  Asa, statin   Neuro check     Abnormality of thoracic aorta  Assessment & Plan  4.0 cm ectasia of the ascending thoracic aorta  Repeat ct chest in 12 months     URI (upper respiratory infection)  Assessment & Plan  Covid/flu/rsv pending  Will start flonase and nasal saline     Tobacco dependence  Assessment & Plan  Encourage pt to stop smoking  Will add nicotine patch     Disease of thyroid gland  Assessment & Plan  Continue synthroid           VTE Prophylaxis: Enoxaparin (Lovenox)  / sequential compression device   Code Status: full code     Anticipated Length of Stay:  Patient will be admitted on an Observation basis with an anticipated length of stay of  less than 2 midnights.   Ju    Chief Complaint:   confusion     History of Present Illness:    Wilma Rowe is a 58 y.o. female pmhx of hypothyroid presents with confusion. She was in normal state of health last night but felt that she was starting to have upper respiratory infection. She stated her symptom was congestion. She took nyquil and woke up without any complaints this am. Then around 10am this morning she started to have acute confusion and slurred speech that waxes and wanes. A stroke alert was called in  the ed and tnk was not given due to nih scale. She denies weakness.     Review of Systems:    Review of Systems   Unable to perform ROS: Mental status change       Past Medical and Surgical History:     Past Medical History:   Diagnosis Date    Colon polyp     Disease of thyroid gland     Urinary incontinence        Past Surgical History:   Procedure Laterality Date    COLONOSCOPY      LASER ABLATION OF THE CERVIX  2008    NE ANTERIOR COLPORRAPHY RPR CYSTOCELE W/CYSTO N/A 7/14/2021    Procedure: Cystocele Repair, Sacrospinous Ligament fixation;  Surgeon: Carlos Sharp MD;  [facility];  Service: Gynecology    NE CYSTOURETHROSCOPY N/A 7/14/2021    Procedure: CYSTOSCOPY;  Surgeon: Carlos Sharp MD;  [facility];  Service: Gynecology    NE SLING OPERATION STRESS INCONTINENCE N/A 7/14/2021    Procedure: INSERTION PUBOVAGINAL SLING (FEMALE);  Surgeon: Carlos Sharp MD;  [facility];  Service: Gynecology    THYROIDECTOMY         Meds/Allergies:    Prior to Admission medications    Medication Sig Start Date End Date Taking? Authorizing Provider   levothyroxine 100 mcg tablet Take 100 mcg by mouth daily 10/7/20  Yes Historical Provider, MD   Multiple Vitamins-Minerals (ALIVE MULTI-VITAMIN PO) Take by mouth daily at bedtime   Yes Historical Provider, MD   TURMERIC PO Take by mouth daily at bedtime   Yes Historical Provider, MD   fluticasone (FLONASE) 50 mcg/act nasal spray 1 spray into each nostril daily 12/16/23   SINDY Alston   Magnesium 500 MG CAPS Take by mouth daily at bedtime    Historical Provider, MD   Mirabegron ER 25 MG TB24 Take 25 mg by mouth in the morning  Patient not taking: Reported on 12/16/2023 7/21/23   Edison Rosenbaum MD   naproxen (NAPROSYN) 500 mg tablet Take 1 tablet (500 mg total) by mouth 2 (two) times a day with meals 1/15/24   Charissa Carpenter MD   cyclobenzaprine (FLEXERIL) 5 mg tablet Take 1 tablet (5 mg total) by mouth 3 (three) times a day as needed for muscle spasms  "1/15/24 2/13/24  Charissa Carpenter MD   ergocalciferol (VITAMIN D2) 50,000 units TAKE 1 CAPSULE BY MOUTH ONE TIME PER WEEK  Patient not taking: Reported on 12/4/2022 9/14/20 2/13/24  Charissa Carpenter MD   methylPREDNISolone 4 MG tablet therapy pack Use as directed on package 12/16/23 2/13/24  SINDY Alston   neomycin-polymyxin-hydrocortisone (CORTISPORIN) 0.35%-10,000 units/mL-1% otic suspension Administer 4 drops into ears 4 (four) times a day  Patient not taking: Reported on 12/16/2023 12/16/23 2/13/24  Charissa Carpenter MD   tobramycin-dexamethasone (TOBRADEX) ophthalmic suspension Administer 1 drop to both eyes every 4 (four) hours while awake  Patient not taking: Reported on 12/16/2023 12/14/23 2/13/24  Charissa Carpenter MD     I have reviewed home medications with patient personally.    Allergies:   Allergies   Allergen Reactions    Morphine Palpitations and Other (See Comments)     And itching       Social History:     Marital Status: /Civil Union     Substance Use History:   Social History     Substance and Sexual Activity   Alcohol Use Yes    Comment: occasionally     Social History     Tobacco Use   Smoking Status Every Day    Current packs/day: 0.50    Types: Cigarettes   Smokeless Tobacco Never     Social History     Substance and Sexual Activity   Drug Use Not Currently       Family History:    non-contributory    Physical Exam:     Vitals:   Blood Pressure: 121/77 (02/13/24 1400)  Pulse: 72 (02/13/24 1400)  Temperature: 98.2 °F (36.8 °C) (02/13/24 1337)  Temp Source: Oral (02/13/24 1337)  Respirations: 20 (02/13/24 1400)  Height: 5' 4\" (162.6 cm) (02/13/24 1256)  Weight - Scale: 66.8 kg (147 lb 4.3 oz) (02/13/24 1256)  SpO2: 98 % (02/13/24 1400)    Physical Exam  Constitutional:       Appearance: Normal appearance.   HENT:      Head: Normocephalic and atraumatic.   Eyes:      Extraocular Movements: Extraocular movements intact.      Pupils: Pupils are equal, round, and reactive to light.   Cardiovascular: "      Rate and Rhythm: Normal rate and regular rhythm.      Heart sounds: No murmur heard.     No friction rub. No gallop.   Pulmonary:      Effort: Pulmonary effort is normal. No respiratory distress.      Breath sounds: Normal breath sounds. No wheezing, rhonchi or rales.   Abdominal:      General: Bowel sounds are normal. There is no distension.      Palpations: Abdomen is soft.      Tenderness: There is no abdominal tenderness. There is no guarding or rebound.   Neurological:      Mental Status: She is alert.      Cranial Nerves: No cranial nerve deficit.      Motor: No weakness.      Comments: Awake alert oriented x2 person, place that improves sometimes to include time.        Additional Data:     Lab Results: I have personally reviewed pertinent reports.      Results from last 7 days   Lab Units 02/13/24  1319   WBC Thousand/uL 8.52   HEMOGLOBIN g/dL 13.9   HEMATOCRIT % 42.7   PLATELETS Thousands/uL 208     Results from last 7 days   Lab Units 02/13/24  1319   POTASSIUM mmol/L 3.9   CHLORIDE mmol/L 106   CO2 mmol/L 27   BUN mg/dL 11   CREATININE mg/dL 0.66   CALCIUM mg/dL 9.4     Results from last 7 days   Lab Units 02/13/24  1319   INR  0.96       Imaging: I have personally reviewed pertinent reports.      CTA stroke alert (head/neck)    Result Date: 2/13/2024  Narrative: CTA NECK AND BRAIN WITH CONTRAST INDICATION: Stroke Alert COMPARISON:   Same day CT stroke alert brain. CT head and CT cervical spine without contrast 5/28/2022. TECHNIQUE:   Post contrast imaging was performed after administration of iodinated contrast through the neck and brain. Post contrast axial 0.625 mm images timed to opacify the arterial system. 3D rendering was performed on an independent workstation.   MIP reconstructions performed. Coronal reconstructions were performed of the noncontrast portion of the brain. Radiation dose length product (DLP) for this visit:  685 mGy-cm .  This examination, like all CT scans performed in the  [facility], was performed utilizing techniques to minimize radiation dose exposure, including the use of iterative reconstruction and automated exposure control. IV Contrast:  85 mL of iohexol (OMNIPAQUE) IMAGE QUALITY:   Diagnostic FINDINGS: CERVICAL VASCULATURE AORTIC ARCH AND GREAT VESSELS: 4.0 cm ectasia of ascending thoracic aorta (2:302). Minor calcified atherosclerotic disease of aortic arch. Normal great vessel origins. Normal visualized subclavian vessels. RIGHT VERTEBRAL ARTERY CERVICAL SEGMENT:  Normal origin. The vessel is mildly hypoplastic in caliber throughout the neck. LEFT VERTEBRAL ARTERY CERVICAL SEGMENT:  Normal origin. The vessel is normal and dominant in caliber throughout the neck. RIGHT EXTRACRANIAL CAROTID SEGMENT:  Normal caliber common carotid artery is patent given motion artifact in the mid-to-lower neck.  Normal bifurcation and cervical internal carotid artery.  No stenosis or dissection given motion artifact. LEFT EXTRACRANIAL CAROTID SEGMENT:  Normal caliber common carotid artery is patent given motion artifact in the mid-to-lower neck.  Normal bifurcation and cervical internal carotid artery.  No stenosis or dissection given motion artifact. NASCET criteria was used to determine the degree of internal carotid artery diameter stenosis. INTRACRANIAL VASCULATURE INTERNAL CAROTID ARTERIES:  Normal enhancement of the intracranial portions of the internal carotid arteries.  Normal ophthalmic artery origins.  Normal ICA terminus. ANTERIOR CIRCULATION: Aplastic right A1 segment, normal variant. Normal caliber left A1 segment. Anterior cerebral arteries with normal enhancement.  Normal anterior communicating artery. MIDDLE CEREBRAL ARTERY CIRCULATION:  M1 segment and middle cerebral artery branches demonstrate normal enhancement bilaterally. DISTAL VERTEBRAL ARTERIES: Patent mildly hypoplastic right and dominant left distal vertebral arteries. Mildly hypoplastic right  vertebral artery becomes more diminutive after PICA takeoff. Posterior inferior cerebellar artery origins are normal. Normal vertebral basilar junction. BASILAR ARTERY:  Basilar artery is normal in caliber.  Normal superior cerebellar arteries. POSTERIOR CEREBRAL ARTERIES: Both posterior cerebral arteries arises from the basilar tip.  Both arteries demonstrate normal enhancement. VENOUS STRUCTURES:  Normal. NON VASCULAR ANATOMY BONY STRUCTURES:  No acute osseous abnormality. 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 Multilevel degenerative changes, moderate at C5-C6. SOFT TISSUES OF THE NECK: Postsurgical changes of right hemithyroidectomy. 0.7 cm hypodense nodule left thyroid lobe. Incidental discovery of one or more thyroid nodule(s) measuring less than 1.5 cm and without suspicious features is noted in this patient who is above 35 years old; according to guidelines published in the February 2015 white paper on incidental thyroid nodules in the Journal of the American College of Radiology (JACR), no further evaluation is recommended. THORACIC INLET: Mild biapical fibrotic change. No focal consolidation visualized upper lung zone. Tiny calcified granuloma left upper lobe. 4.0 cm ectasia of ascending thoracic aorta (2:302).     Impression: Negative CTA head and neck for large vessel occlusion, dissection, aneurysm, or high-grade stenosis. 4.0 cm ectasia of ascending thoracic aorta. Recommend follow-up CT chest in 12 months. Additional chronic/incidental findings as detailed above. Findings were directly discussed with Tanesha Purdy at approximately 1:28 PM. Workstation performed: [id number]     CT stroke alert brain    Result Date: 2/13/2024  Narrative: CT BRAIN - STROKE ALERT PROTOCOL INDICATION:   Stroke Alert. COMPARISON: Pending same day CTA stroke alert head and neck. CT head without contrast 5/28/2022. TECHNIQUE:  CT examination of the brain was performed.  In addition to axial  images, coronal reformatted images were created and submitted for interpretation. Radiation dose length product (DLP) for this visit:  903 mGy-cm .  This examination, like all CT scans performed in the [facility], was performed utilizing techniques to minimize radiation dose exposure, including the use of iterative reconstruction and automated exposure control. IMAGE QUALITY:  Diagnostic. Beam-hardening streak artifact in the lonnie. FINDINGS: PARENCHYMA:  No intracranial mass, mass effect or midline shift. No CT signs of acute infarction given beam-hardening streak artifact in the lonnie.  No acute parenchymal hemorrhage. Mild calcification of the cavernous internal carotid and left intradural vertebral arteries. VENTRICLES AND EXTRA-AXIAL SPACES:  Normal for the patient's age. VISUALIZED ORBITS: Normal visualized orbits. PARANASAL SINUSES: Normal visualized paranasal sinuses. CALVARIUM AND EXTRACRANIAL SOFT TISSUES:   Normal. 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     Impression: No acute intracranial abnormality given beam-hardening streak artifact in the lonnie. Findings were directly discussed with Tanesha Purdy at approximately 1:28 PM. Workstation performed: [id number]       EKG, Pathology, and Other Studies Reviewed on Admission:   EKG: nsr at 77bpm no st changes were noted     Epic / Care Everywhere Records Reviewed: Yes

## 2024-02-14 ENCOUNTER — APPOINTMENT (OUTPATIENT)
Dept: NON INVASIVE DIAGNOSTICS | Facility: HOSPITAL | Age: 59
End: 2024-02-14
Payer: COMMERCIAL

## 2024-02-14 VITALS
RESPIRATION RATE: 16 BRPM | HEIGHT: 65 IN | BODY MASS INDEX: 24.49 KG/M2 | DIASTOLIC BLOOD PRESSURE: 76 MMHG | HEART RATE: 76 BPM | TEMPERATURE: 98 F | OXYGEN SATURATION: 94 % | SYSTOLIC BLOOD PRESSURE: 118 MMHG | WEIGHT: 147 LBS

## 2024-02-14 PROBLEM — I31.39 PERICARDIAL EFFUSION: Status: ACTIVE | Noted: 2024-02-14

## 2024-02-14 LAB
ANION GAP SERPL CALCULATED.3IONS-SCNC: 6 MMOL/L
AORTIC ROOT: 2.4 CM
APICAL FOUR CHAMBER EJECTION FRACTION: 70 %
ASCENDING AORTA: 4 CM
BSA FOR ECHO PROCEDURE: 1.74 M2
BUN SERPL-MCNC: 23 MG/DL (ref 5–25)
CALCIUM SERPL-MCNC: 9.1 MG/DL (ref 8.4–10.2)
CHLORIDE SERPL-SCNC: 107 MMOL/L (ref 96–108)
CHOLEST SERPL-MCNC: 174 MG/DL
CO2 SERPL-SCNC: 27 MMOL/L (ref 21–32)
CREAT SERPL-MCNC: 0.67 MG/DL (ref 0.6–1.3)
E WAVE DECELERATION TIME: 204 MS
E/A RATIO: 0.78
ERYTHROCYTE [DISTWIDTH] IN BLOOD BY AUTOMATED COUNT: 14.4 % (ref 11.6–15.1)
EST. AVERAGE GLUCOSE BLD GHB EST-MCNC: 120 MG/DL
FRACTIONAL SHORTENING: 38 (ref 28–44)
GFR SERPL CREATININE-BSD FRML MDRD: 96 ML/MIN/1.73SQ M
GLUCOSE P FAST SERPL-MCNC: 97 MG/DL (ref 65–99)
GLUCOSE SERPL-MCNC: 97 MG/DL (ref 65–140)
HBA1C MFR BLD: 5.8 %
HCT VFR BLD AUTO: 39.2 % (ref 34.8–46.1)
HDLC SERPL-MCNC: 79 MG/DL
HGB BLD-MCNC: 12.8 G/DL (ref 11.5–15.4)
INTERVENTRICULAR SEPTUM IN DIASTOLE (PARASTERNAL SHORT AXIS VIEW): 0.8 CM
INTERVENTRICULAR SEPTUM: 0.8 CM (ref 0.6–1.1)
LAAS-AP2: 13.7 CM2
LAAS-AP4: 14.7 CM2
LDLC SERPL CALC-MCNC: 69 MG/DL (ref 0–100)
LEFT ATRIUM SIZE: 3.1 CM
LEFT ATRIUM VOLUME (MOD BIPLANE): 35 ML
LEFT ATRIUM VOLUME INDEX (MOD BIPLANE): 20.1 ML/M2
LEFT INTERNAL DIMENSION IN SYSTOLE: 2.3 CM (ref 2.1–4)
LEFT VENTRICLE DIASTOLIC VOLUME (MOD BIPLANE): 49 ML
LEFT VENTRICLE DIASTOLIC VOLUME INDEX (MOD BIPLANE): 28.2 ML/M2
LEFT VENTRICLE SYSTOLIC VOLUME (MOD BIPLANE): 13 ML
LEFT VENTRICLE SYSTOLIC VOLUME INDEX (MOD BIPLANE): 7.5 ML/M2
LEFT VENTRICULAR INTERNAL DIMENSION IN DIASTOLE: 3.7 CM (ref 3.5–6)
LEFT VENTRICULAR POSTERIOR WALL IN END DIASTOLE: 0.8 CM
LEFT VENTRICULAR STROKE VOLUME: 41 ML
LV EF: 73 %
LVSV (TEICH): 41 ML
MCH RBC QN AUTO: 28.4 PG (ref 26.8–34.3)
MCHC RBC AUTO-ENTMCNC: 32.7 G/DL (ref 31.4–37.4)
MCV RBC AUTO: 87 FL (ref 82–98)
MV E'TISSUE VEL-SEP: 8 CM/S
MV PEAK A VEL: 0.76 M/S
MV PEAK E VEL: 59 CM/S
MV STENOSIS PRESSURE HALF TIME: 59 MS
MV VALVE AREA P 1/2 METHOD: 3.73
PLATELET # BLD AUTO: 187 THOUSANDS/UL (ref 149–390)
PMV BLD AUTO: 11.2 FL (ref 8.9–12.7)
POTASSIUM SERPL-SCNC: 3.5 MMOL/L (ref 3.5–5.3)
RBC # BLD AUTO: 4.5 MILLION/UL (ref 3.81–5.12)
RIGHT ATRIUM AREA SYSTOLE A4C: 13.4 CM2
RIGHT VENTRICLE ID DIMENSION: 3.6 CM
SL CV LEFT ATRIUM LENGTH A2C: 4.5 CM
SL CV PED ECHO LEFT VENTRICLE DIASTOLIC VOLUME (MOD BIPLANE) 2D: 59 ML
SL CV PED ECHO LEFT VENTRICLE SYSTOLIC VOLUME (MOD BIPLANE) 2D: 18 ML
SL CV SINUS OF VALSALVA 2D: 3.5 CM
SODIUM SERPL-SCNC: 140 MMOL/L (ref 135–147)
TR MAX PG: 16 MMHG
TR PEAK VELOCITY: 2 M/S
TRICUSPID ANNULAR PLANE SYSTOLIC EXCURSION: 2.2 CM
TRICUSPID VALVE PEAK REGURGITATION VELOCITY: 2.01 M/S
TRIGL SERPL-MCNC: 129 MG/DL
WBC # BLD AUTO: 5.71 THOUSAND/UL (ref 4.31–10.16)

## 2024-02-14 PROCEDURE — 80061 LIPID PANEL: CPT | Performed by: INTERNAL MEDICINE

## 2024-02-14 PROCEDURE — 99233 SBSQ HOSP IP/OBS HIGH 50: CPT | Performed by: PSYCHIATRY & NEUROLOGY

## 2024-02-14 PROCEDURE — 99239 HOSP IP/OBS DSCHRG MGMT >30: CPT | Performed by: PHYSICIAN ASSISTANT

## 2024-02-14 PROCEDURE — 83036 HEMOGLOBIN GLYCOSYLATED A1C: CPT | Performed by: INTERNAL MEDICINE

## 2024-02-14 PROCEDURE — 93306 TTE W/DOPPLER COMPLETE: CPT

## 2024-02-14 PROCEDURE — RECHECK: Performed by: PHYSICIAN ASSISTANT

## 2024-02-14 PROCEDURE — 85027 COMPLETE CBC AUTOMATED: CPT | Performed by: INTERNAL MEDICINE

## 2024-02-14 PROCEDURE — 92610 EVALUATE SWALLOWING FUNCTION: CPT

## 2024-02-14 PROCEDURE — 93306 TTE W/DOPPLER COMPLETE: CPT | Performed by: INTERNAL MEDICINE

## 2024-02-14 PROCEDURE — 97161 PT EVAL LOW COMPLEX 20 MIN: CPT

## 2024-02-14 PROCEDURE — 80048 BASIC METABOLIC PNL TOTAL CA: CPT | Performed by: INTERNAL MEDICINE

## 2024-02-14 PROCEDURE — 99204 OFFICE O/P NEW MOD 45 MIN: CPT

## 2024-02-14 RX ORDER — NICOTINE 21 MG/24HR
1 PATCH, TRANSDERMAL 24 HOURS TRANSDERMAL DAILY
Qty: 28 PATCH | Refills: 0 | Status: SHIPPED | OUTPATIENT
Start: 2024-02-15 | End: 2024-02-21 | Stop reason: SDUPTHER

## 2024-02-14 RX ADMIN — Medication 1 PATCH: at 08:25

## 2024-02-14 RX ADMIN — ASPIRIN 81 MG CHEWABLE TABLET 81 MG: 81 TABLET CHEWABLE at 08:24

## 2024-02-14 RX ADMIN — ENOXAPARIN SODIUM 40 MG: 40 INJECTION SUBCUTANEOUS at 08:25

## 2024-02-14 RX ADMIN — FLUTICASONE PROPIONATE 1 SPRAY: 50 SPRAY, METERED NASAL at 08:34

## 2024-02-14 RX ADMIN — LEVOTHYROXINE SODIUM 100 MCG: 100 TABLET ORAL at 05:07

## 2024-02-14 RX ADMIN — ACETAMINOPHEN 325MG 650 MG: 325 TABLET ORAL at 12:14

## 2024-02-14 RX ADMIN — Medication 400 MG: at 08:25

## 2024-02-14 NOTE — OCCUPATIONAL THERAPY NOTE
Occupational Therapy Evaluation     Patient Name: Wilma Rowe  Today's Date: 2/14/2024  Problem List  Principal Problem:    Acute encephalopathy  Active Problems:    Disease of thyroid gland    Tobacco dependence    URI (upper respiratory infection)    Stroke-like symptoms    Abnormality of thoracic aorta    Past Medical History  Past Medical History:   Diagnosis Date    Colon polyp     Disease of thyroid gland     Urinary incontinence      Past Surgical History  Past Surgical History:   Procedure Laterality Date    COLONOSCOPY      LASER ABLATION OF THE CERVIX  2008    MS ANTERIOR COLPORRAPHY RPR CYSTOCELE W/CYSTO N/A 7/14/2021    Procedure: Cystocele Repair, Sacrospinous Ligament fixation;  Surgeon: Carlos Sharp MD;  [facility];  Service: Gynecology    MS CYSTOURETHROSCOPY N/A 7/14/2021    Procedure: CYSTOSCOPY;  Surgeon: Carlos Sharp MD;  [facility];  Service: Gynecology    MS SLING OPERATION STRESS INCONTINENCE N/A 7/14/2021    Procedure: INSERTION PUBOVAGINAL SLING (FEMALE);  Surgeon: Carlos Sharp MD;  [facility];  Service: Gynecology    THYROIDECTOMY           02/14/24 0953   OT Last Visit   OT Visit Date 02/14/24   Note Type   Note type Evaluation   Pain Assessment   Pain Assessment Tool 0-10   Pain Score No Pain   Restrictions/Precautions   Other Precautions Telemetry   Home Living   Type of Home House   Home Layout 1/2 bath on main level;Bed/bath upstairs;Stairs to enter without rails;Laundry in basement;Multi-level   Bathroom Shower/Tub Tub/shower unit   Bathroom Toilet Standard   Prior Function   Level of [place] Independent with ADLs;Independent with functional mobility;Independent with IADLS   Lives With Daughter   IADLs Independent with driving;Independent with meal prep;Independent with medication management   Falls in the last 6 months 0   Vocational Full time employment   Lifestyle   [employer] PTA, was (I) with ADLs and was (I) with IADLs. Patient lives in  "a multi-level home w/ 1st fl s/u. No DME use. FT employment. (+) .   General   Additional Pertinent History Comorbidities affecting pt’s functional performance include a significant PMH of: dysphonia, dysphagia, hx total thyroidectomy, vaginal atrophy.  Patient with active OT orders and activity orders for Up and OOB as tolerated .   Family/Caregiver Present Yes   Subjective   Subjective \"I'm better\"   ADL   Where Assessed Edge of bed   Eating Assistance 7  Independent   Grooming Assistance 7  Independent   UB Bathing Assistance 7  Independent   LB Bathing Assistance 7  Independent   UB Dressing Assistance 7  Independent   LB Dressing Assistance 7  Independent   Toileting Assistance  7  Independent   Bed Mobility   Supine to Sit 6  Modified independent   Additional items HOB elevated   Transfers   Sit to Stand 7  Independent   Stand to Sit 7  Independent   Functional Mobility   Functional Mobility 7  Independent   Balance   Static Sitting Normal   Dynamic Sitting Good   Static Standing Fair +   Dynamic Standing Fair   Ambulatory Fair   Activity Tolerance   Activity Tolerance Patient tolerated treatment well   Medical Staff Made Aware PT, neuro, CM   Nurse Made Aware Yes   RUE Assessment   RUE Assessment WNL   LUE Assessment   LUE Assessment WNL   Hand Function   Gross Motor Coordination Functional   Fine Motor Coordination Functional   Sensation   Light Touch No apparent deficits   Proprioception   Proprioception No apparent deficits   Vision-Basic Assessment   Current Vision No visual deficits   Vision - Complex Assessment   Ocular Range of Motion Intact   Perception   Inattention/Neglect Appears intact   Cognition   Overall Cognitive Status WFL   Arousal/Participation Alert;Responsive;Cooperative   Attention Within functional limits   Orientation Level Oriented X4   Memory Within functional limits   Following Commands Follows all commands and directions without difficulty   Assessment   Assessment Patient is " a 58 y.o. year old female seen for OT eval s/p admit to [facility] on 2/13/2024 with acute encephalopathy, URI, stroke-like symptoms.  OT consulted to assess ADLs/IADLs/functional mobility and assist w/ D/C planning. Pt reports symptoms have resolved, feels back to her baseline. ADLs and functional mobility/transfers completed (I) w/o AD. B/l UE ROM/MMT WNL. A&Ox4.  strength 5/5, symmetrical. No further acute OT needs identified at this time. Recommend continued mobilization with hospital staff while in the hospital to increase pt’s endurance and strength upon D/C. From OT standpoint, recommend D/C to home with family support when medically cleared. D/C pt from OT caseload at this time.   Plan   OT Frequency Eval only   Discharge Recommendation   Rehab Resource Intensity Level, OT No post-acute rehabilitation needs   AM-PAC Daily Activity Inpatient   Lower Body Dressing 4   Bathing 4   Toileting 4   Upper Body Dressing 4   Grooming 4   Eating 4   Daily Activity Raw Score 24   Daily Activity Standardized Score (Calc for Raw Score >=11) 57.54   AM-PAC Applied Cognition Inpatient   Following a Speech/Presentation 4   Understanding Ordinary Conversation 4   Taking Medications 4   Remembering Where Things Are Placed or Put Away 4   Remembering List of 4-5 Errands 4   Taking Care of Complicated Tasks 4   Applied Cognition Raw Score 24   Applied Cognition Standardized Score 62.21     Cristina Davison

## 2024-02-14 NOTE — SPEECH THERAPY NOTE
Speech Language/Pathology  Speech/Language Pathology  Assessment    Patient Name: Wilma Rowe  Today's Date: 2/14/2024     Problem List  Principal Problem:    Acute encephalopathy  Active Problems:    Disease of thyroid gland    Tobacco dependence    URI (upper respiratory infection)    Stroke-like symptoms    Abnormality of thoracic aorta    Past Medical History  Past Medical History:   Diagnosis Date    Colon polyp     Disease of thyroid gland     Urinary incontinence      Past Surgical History  Past Surgical History:   Procedure Laterality Date    COLONOSCOPY      LASER ABLATION OF THE CERVIX  2008    VT ANTERIOR COLPORRAPHY RPR CYSTOCELE W/CYSTO N/A 7/14/2021    Procedure: Cystocele Repair, Sacrospinous Ligament fixation;  Surgeon: Carlos Sharp MD;  [facility];  Service: Gynecology    VT CYSTOURETHROSCOPY N/A 7/14/2021    Procedure: CYSTOSCOPY;  Surgeon: Carlos Sharp MD;  [facility];  Service: Gynecology    VT SLING OPERATION STRESS INCONTINENCE N/A 7/14/2021    Procedure: INSERTION PUBOVAGINAL SLING (FEMALE);  Surgeon: Carlos Sharp MD;  [facility];  Service: Gynecology    THYROIDECTOMY            Bedside Swallow Evaluation:    Summary:  Pt presented w/ nasal congestion, otherwise speech was clear and fluent. No facial asymmetry. Stated her daughter overreacted, but admits to not remembering what happened. Fed self omelette and fresh fruit. Also drank coffee and water. Mastication, bolus formation, control, and transfer WNL. Swallows prompt. No cough or wet vocal quality. Oral/pharyngeal stages WNL. H/o reflux. Stated she is not taking meds. She just tries to watch what she eats. ENT consult 2022 below.     Recommendations:  Diet:regular  Liquid:thin  Meds: as toelrated  Supervision:none  Positioning:Upright  Pt to take PO/Meds only when fully alert and upright.   Oral care  Aspiration precautions  Reflux precautions  Eval only, No f/u tx indicated.     Consider consult  w/:  Rehab  Neurology  Nutrition    H&P/Admit info/ pertinent provider notes: (PMH noted above)  Chief Complaint:   confusion   History of Present Illness:  Wilma Rowe is a 58 y.o. female pmhx of hypothyroid presents with confusion. She was in normal state of health last night but felt that she was starting to have upper respiratory infection. She stated her symptom was congestion. She took nyquil and woke up without any complaints this am. Then around 10am this morning she started to have acute confusion and slurred speech that waxes and wanes. A stroke alert was called in the ed and tnk was not given due to nih scale. She denies weakness.       Special Studies:  Mri 2/13  No evidence of acute infarct, intracranial hemorrhage or mass.   2/13 CTA  Negative CTA head and neck for large vessel occlusion, dissection, aneurysm, or high-grade stenosis.  4.0 cm ectasia of ascending thoracic aorta. Recommend follow-up CT chest in 12 months.  Additional chronic/incidental findings as detailed above.    Procalcitonin:    WBC:  5.71  2/14       Previous MBS:  None  Seen by ENT  5/31/22 for voice change after thyroid sx. See consult for multiple dx (one was GERD)  Plan was:  Plan:  Change in voice/swallow following total thyroidectomy surgery - favor RLN/SLN injury with permanent paresis R>L  Also severe Moncho's edema from smoking  Dry saliva/mucous  Compensatory MTD  Smoking cessation strongly advised  Nicotine patch Rx  Labs  Voice therapy (SLP) if needed/desired  Hydrate optimally  Breathe right strips  Reflux diet/lifestyle modifications  Mediterranean diet  Gluten/dairy reduced  She will return to my office in 3 mos    Patient's goal: none stated    Did the pt report pain? no  If yes, was nursing notified/was it addressed? N/a    Reason for consult:  R/o aspiration  Determine safest and least restrictive diet  Change in mental status  New neuro event  Stroke protocol  h/o dysphagia/  /esophageal/gi    Precautions:  Fall    Food Allergies:  none   Current Diet:  regular   Premorbid diet:  regular   O2 requirement:  none   Social/Prior living  Lives w/ children   Voice/Speech:  Low pitch   Follows commands:  yes   Cognitive status:  Alert and wnl     [facility]  Dentition:natural  Lips (VII):+  Tongue (XII):+  Secretion management:wnl    Esophageal stage:  H/o GERD    Results d/w:  Pt, nursing

## 2024-02-14 NOTE — ASSESSMENT & PLAN NOTE
Echocardiogram obtained for stroke pathway  Pericardial effusion noted, seen in consult by cardiology-asymptomatic, continue outpatient follow-up

## 2024-02-14 NOTE — ASSESSMENT & PLAN NOTE
Echocardiogram obtained for stroke pathway  Pericardial effusion noted, will consult cardiology for recommendations and evaluation

## 2024-02-14 NOTE — PROGRESS NOTES
"Progress Note - Neurology   Wilma Rowe 58 y.o. female MRN: [id number]  Unit/Bed#: E4 -01 Encounter: [id number]    Assessment/Plan   Stroke-like symptoms  Assessment & Plan  58 year old female with past medical hx as listed below, the patient presents with acute confusion, otherwise non focal examination.  NIH is a zero.  CT of head with no acute intracranial abnormality, CTA of head and neck negative for large vessel  occlusion or significant stenosis.   The patient is not a tnk or thrombectomy candidate.  MRI of brain with out contrast - no evidence of acute infarct, ICH or mass.  LDL was 69, A1c pending.  Metabolic and infectious work up has been unrevealing at this time.     Suspect TGA    - Stroke pathway  Echo pending  Lipid Panel - LDL 69  Hemoglobin A1c  Aspirin 81 mg (takes at home, no need from neuroperspective)  No need for statin per neurological stand point  Tobacco cessation  Continue telemetry  PT/OT/ST  Frequent neuro checks. Continue to monitor and notify neurology with any changes.   - Medical management and supportive care per primary team. Correction of any metabolic or infectious disturbances.    - Reviewed case with attending neurologist      Wilma Rowe will need follow up in in 6 weeks with general attending. She will not require outpatient neurological testing.    Subjective:   Neurological follow up.   The patient is back to baseline, no new neurological complaints.   No headache, no aphasia, no dysarthria, no memory problems currently, does not remember bits and pieces of yesterday, non focal examination, no one sided weakness or numbness or ataxia.     Vitals: Blood pressure 129/85, pulse 77, temperature 99 °F (37.2 °C), temperature source Temporal, resp. rate 18, height 5' 5\" (1.651 m), weight 66.7 kg (147 lb), SpO2 95%.,Body mass index is 24.46 kg/m².    Current Facility-Administered Medications   Medication Dose Route Frequency Provider Last Rate    acetaminophen  650 mg " Oral Q4H PRN Tori Ambron, DO      aspirin  81 mg Oral Daily Tori Ambron, DO      atorvastatin  40 mg Oral QPM Tori Ambron, DO      enoxaparin  40 mg Subcutaneous Daily Tori Ambron, DO      fluticasone  1 spray Nasal Daily Tori Ambron, DO      levothyroxine  100 mcg Oral Early Morning Tori Ambron, DO      magnesium Oxide  400 mg Oral Daily Tori Ambron, DO      nicotine  1 patch Transdermal Daily Tori Ambron, DO      ondansetron  4 mg Intravenous Q6H PRN Tori Ambron, DO      sodium chloride  1 spray Each Nare Q1H PRN Tori Ambron, DO          Physical Exam:   Neurological  Mental status - the patient is awake alert oriented x 3, with no evidence of aphasia or dysarthria, patient is able to follow simple commands, is able to follow complex commands.  No para-phasic errors noted.  She is able to tell me the month, season, holiday today, she was able to recognize family in the room.   Cranial nerves 2 through 12 are intact  Motor - 5/5 upper extremities and lower extremities, without drift, normal tone and bulk.  No drift in the upper or lowers, non focal motor examination.   No tremor or fixed deficit noted  Rapid movements are equal bilaterally  Sensation - non-focal to touch  Coordination -  no ataxia noted on finger-to-nose   Toes are down-going bilaterally  No evidence of seizure activity, observed.      Lab, Imaging and other studies: I have personally reviewed pertinent reports.  , CBC:   Results from last 7 days   Lab Units 02/14/24  0442 02/13/24  1619 02/13/24  1319   WBC Thousand/uL 5.71  --  8.52   RBC Million/uL 4.50  --  4.88   HEMOGLOBIN g/dL 12.8  --  13.9   HEMATOCRIT % 39.2  --  42.7   MCV fL 87  --  88   PLATELETS Thousands/uL 187 198 208   , BMP/CMP:   Results from last 7 days   Lab Units 02/14/24  0442 02/13/24  1319   SODIUM mmol/L 140 139   POTASSIUM mmol/L 3.5 3.9   CHLORIDE mmol/L 107 106   CO2 mmol/L 27 27   BUN mg/dL 23 11   CREATININE mg/dL 0.67 0.66   CALCIUM  "mg/dL 9.1 9.4   EGFR ml/min/1.73sq m 96 97   , Vitamin B12:   , HgBA1C:   , TSH:   , Coagulation:   Results from last 7 days   Lab Units 02/13/24  1319   INR  0.96   , Lipid Profile:   Results from last 7 days   Lab Units 02/14/24  0442   HDL mg/dL 79   LDL CALC mg/dL 69   TRIGLYCERIDES mg/dL 129   , Ammonia:   , Urinalysis:   Results from last 7 days   Lab Units 02/13/24  1358   COLOR UA  Yellow   CLARITY UA  Clear   SPEC GRAV UA  <=1.005   PH UA  6.0   LEUKOCYTES UA  Negative   NITRITE UA  Negative   GLUCOSE UA mg/dl Negative   KETONES UA mg/dl Negative   BILIRUBIN UA  Negative   BLOOD UA  Large*   , Drug Screen:   , Medication Drug Levels:       Invalid input(s): \"CARBAMAZEPINE\", \"LACOSAMIDE\", \"OXCARBAZEPINE\"    Procedure: MRI brain wo contrast    Result Date: 2/14/2024  Narrative: MRI BRAIN WITHOUT CONTRAST INDICATION: Altered mental status. COMPARISON: 2/13/2024. TECHNIQUE:  Multiplanar, multisequence imaging of the brain was performed. IMAGE QUALITY:  Diagnostic. FINDINGS: BRAIN PARENCHYMA: Few periventricular and subcortical punctate T2/FLAIR hyperintense foci suggesting early microangiopathic change. No restricted diffusion. No acute intracranial hemorrhage or mass effect. VENTRICLES: No hydrocephalus or extra-axial collection. SELLA AND PITUITARY GLAND:  Normal. ORBITS: Intact. PARANASAL SINUSES:  Normal. VASCULATURE:  Evaluation of the major intracranial vasculature demonstrates appropriate flow voids. CALVARIUM AND SKULL BASE:  Normal. EXTRACRANIAL SOFT TISSUES:  Normal.     Impression: No evidence of acute infarct, intracranial hemorrhage or mass. Workstation performed: [id number]     Procedure: CTA stroke alert (head/neck)    Result Date: 2/13/2024  Narrative: CTA NECK AND BRAIN WITH CONTRAST INDICATION: Stroke Alert COMPARISON:   Same day CT stroke alert brain. CT head and CT cervical spine without contrast 5/28/2022. TECHNIQUE:   Post contrast imaging was performed after administration of iodinated " contrast through the neck and brain. Post contrast axial 0.625 mm images timed to opacify the arterial system. 3D rendering was performed on an independent workstation.   MIP reconstructions performed. Coronal reconstructions were performed of the noncontrast portion of the brain. Radiation dose length product (DLP) for this visit:  685 mGy-cm .  This examination, like all CT scans performed in the [facility], was performed utilizing techniques to minimize radiation dose exposure, including the use of iterative reconstruction and automated exposure control. IV Contrast:  85 mL of iohexol (OMNIPAQUE) IMAGE QUALITY:   Diagnostic FINDINGS: CERVICAL VASCULATURE AORTIC ARCH AND GREAT VESSELS: 4.0 cm ectasia of ascending thoracic aorta (2:302). Minor calcified atherosclerotic disease of aortic arch. Normal great vessel origins. Normal visualized subclavian vessels. RIGHT VERTEBRAL ARTERY CERVICAL SEGMENT:  Normal origin. The vessel is mildly hypoplastic in caliber throughout the neck. LEFT VERTEBRAL ARTERY CERVICAL SEGMENT:  Normal origin. The vessel is normal and dominant in caliber throughout the neck. RIGHT EXTRACRANIAL CAROTID SEGMENT:  Normal caliber common carotid artery is patent given motion artifact in the mid-to-lower neck.  Normal bifurcation and cervical internal carotid artery.  No stenosis or dissection given motion artifact. LEFT EXTRACRANIAL CAROTID SEGMENT:  Normal caliber common carotid artery is patent given motion artifact in the mid-to-lower neck.  Normal bifurcation and cervical internal carotid artery.  No stenosis or dissection given motion artifact. NASCET criteria was used to determine the degree of internal carotid artery diameter stenosis. INTRACRANIAL VASCULATURE INTERNAL CAROTID ARTERIES:  Normal enhancement of the intracranial portions of the internal carotid arteries.  Normal ophthalmic artery origins.  Normal ICA terminus. ANTERIOR CIRCULATION: Aplastic right A1 segment,  normal variant. Normal caliber left A1 segment. Anterior cerebral arteries with normal enhancement.  Normal anterior communicating artery. MIDDLE CEREBRAL ARTERY CIRCULATION:  M1 segment and middle cerebral artery branches demonstrate normal enhancement bilaterally. DISTAL VERTEBRAL ARTERIES: Patent mildly hypoplastic right and dominant left distal vertebral arteries. Mildly hypoplastic right vertebral artery becomes more diminutive after PICA takeoff. Posterior inferior cerebellar artery origins are normal. Normal vertebral basilar junction. BASILAR ARTERY:  Basilar artery is normal in caliber.  Normal superior cerebellar arteries. POSTERIOR CEREBRAL ARTERIES: Both posterior cerebral arteries arises from the basilar tip.  Both arteries demonstrate normal enhancement. VENOUS STRUCTURES:  Normal. NON VASCULAR ANATOMY BONY STRUCTURES:  No acute osseous abnormality. 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 Multilevel degenerative changes, moderate at C5-C6. SOFT TISSUES OF THE NECK: Postsurgical changes of right hemithyroidectomy. 0.7 cm hypodense nodule left thyroid lobe. Incidental discovery of one or more thyroid nodule(s) measuring less than 1.5 cm and without suspicious features is noted in this patient who is above 35 years old; according to guidelines published in the February 2015 white paper on incidental thyroid nodules in the Journal of the American College of Radiology (JACR), no further evaluation is recommended. THORACIC INLET: Mild biapical fibrotic change. No focal consolidation visualized upper lung zone. Tiny calcified granuloma left upper lobe. 4.0 cm ectasia of ascending thoracic aorta (2:302).     Impression: Negative CTA head and neck for large vessel occlusion, dissection, aneurysm, or high-grade stenosis. 4.0 cm ectasia of ascending thoracic aorta. Recommend follow-up CT chest in 12 months. Additional chronic/incidental findings as detailed above. Findings were  directly discussed with Tanesha Purdy at approximately 1:28 PM. Workstation performed: [id number]     Procedure: CT stroke alert brain    Result Date: 2/13/2024  Narrative: CT BRAIN - STROKE ALERT PROTOCOL INDICATION:   Stroke Alert. COMPARISON: Pending same day CTA stroke alert head and neck. CT head without contrast 5/28/2022. TECHNIQUE:  CT examination of the brain was performed.  In addition to axial images, coronal reformatted images were created and submitted for interpretation. Radiation dose length product (DLP) for this visit:  903 mGy-cm .  This examination, like all CT scans performed in the [facility], was performed utilizing techniques to minimize radiation dose exposure, including the use of iterative reconstruction and automated exposure control. IMAGE QUALITY:  Diagnostic. Beam-hardening streak artifact in the lonnie. FINDINGS: PARENCHYMA:  No intracranial mass, mass effect or midline shift. No CT signs of acute infarction given beam-hardening streak artifact in the lonnie.  No acute parenchymal hemorrhage. Mild calcification of the cavernous internal carotid and left intradural vertebral arteries. VENTRICLES AND EXTRA-AXIAL SPACES:  Normal for the patient's age. VISUALIZED ORBITS: Normal visualized orbits. PARANASAL SINUSES: Normal visualized paranasal sinuses. CALVARIUM AND EXTRACRANIAL SOFT TISSUES:   Normal. 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     Impression: No acute intracranial abnormality given beam-hardening streak artifact in the lonnie. Findings were directly discussed with Tanesha Purdy at approximately 1:28 PM. Workstation performed: [id number]       Counseling / Coordination of Care  Reviewed case with neurology attending, history and physical examination, labs and imaging completed, plan of care as per attending physician.    Please see attestation for further details.

## 2024-02-14 NOTE — CASE MANAGEMENT
Case Management Assessment & Discharge Planning Note    Patient name Wilma Rowe  Location [facility] /E4 -* MRN [id number]  : 1965 Date 2024       Current Admission Date: 2024  Current Admission Diagnosis:Acute encephalopathy   Patient Active Problem List    Diagnosis Date Noted    Acute encephalopathy 2024    URI (upper respiratory infection) 2024    Stroke-like symptoms 2024    Abnormality of thoracic aorta 2024    Urge incontinence 2023    Dense breast tissue on mammogram 2023    Vaginal atrophy 2023    Dysphonia 2022    Bilateral vocal fold paresis 2022    Gluten intolerance 2022    Gastroesophageal reflux disease 2022    Pharyngoesophageal dysphagia 2022    Reflux laryngitis 2022    Muscle tension dysphonia 2022    Moncho's edema of vocal folds 2022    Hx of total thyroidectomy 2022    Nasal obstruction 2022    Tobacco dependence 2021    Disease of thyroid gland     Incomplete uterovaginal prolapse 10/18/2019    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                      Patient Information  Admitted from:: Home  Mental Status: Alert  During Assessment patient was accompanied by: Other-Comment (ex )  Assessment information provided by:: Patient  Primary Caregiver: Self  County of Residence: [place]  What city do you live in?: [place]    Activities of Daily Living Prior to  Admission  Completes ADLs independently?: Yes  Ambulates independently?: Yes  Does patient use assisted devices?: No  Does patient currently own DME?: No  Does patient have a history of Outpatient Therapy (PT/OT)?: No  Does the patient have a history of Short-Term Rehab?: No  Does patient have a history of HHC?: No  Does patient currently have HHC?: No         Patient Information Continued  Income Source: Employed  Does patient have prescription coverage?: Yes  Does patient receive dialysis treatments?: No  Does patient have a history of substance abuse?: No  Does patient have a history of Mental Health Diagnosis?: No         Means of Transportation  Means of Transport to Appts:: Drives Self      Social Determinants of Health (SDOH)      Flowsheet Row Most Recent Value   Housing Stability    In the last 12 months, was there a time when you were not able to pay the mortgage or rent on time? N   In the last 12 months, how many places have you lived? 1   In the last 12 months, was there a time when you did not have a steady place to sleep or slept in a shelter (including now)? N   Transportation Needs    In the past 12 months, has lack of transportation kept you from medical appointments or from getting medications? no   In the past 12 months, has lack of transportation kept you from meetings, work, or from getting things needed for daily living? No   Food Insecurity    Within the past 12 months, you worried that your food would run out before you got the money to buy more. Sometimes   Within the past 12 months, the food you bought just didn't last and you didn't have money to get more. Sometimes   Utilities    In the past 12 months has the electric, gas, oil, or water company threatened to shut off services in your home? No            DISCHARGE DETAILS:    Discharge planning discussed with:: Pt        CM contacted family/caregiver?: No- see comments (pt able to answer for herself)                  Requested Home  Health Care         Is the patient interested in HHC at discharge?: No    DME Referral Provided  Referral made for DME?: No              Treatment Team Recommendation: Home  Discharge Destination Plan:: Home  Transport at Discharge : Automobile, Family                                      Additional Comments: CM assessment was completed with the pt.  Ex- carolebad present in room.  Alert and oriented x 4.  Spoke to patient and no needs anticipated on discharge.  Plan to discharge home to day.  Verbalized understanding of following any recommendations for follow ups after dc.  Ex-  will prvide transport home.

## 2024-02-14 NOTE — DISCHARGE SUMMARY
Critical access hospital  Discharge- Wilma Rowe 1965, 58 y.o. female MRN: [id number]  Unit/Bed#: E4 -01 Encounter: [id number]  Primary Care Provider: Charissa Carpenter MD   Date and time admitted to hospital: 2/13/2024 12:53 PM    * Acute encephalopathy  Assessment & Plan  She had been in her normal state of health except for compliant of uri symptoms in which she took nyquil  She woke up without any issues this am and then around 10am she became confused with slurred speech  Stroke alert in ed was called  Cta head and neck and ct head were negative  Notes complete resolution in her confusion this morning  Likely related to NyQuil versus significant exertion with shoveling snow  Ua negative  Covid/flu/rsv negative  Placed on the stroke pathway  Neurology evaluation, likely transient global amnesia  MRI brain negative  No indication to continue aspirin per neurology    Pericardial effusion  Assessment & Plan  Echocardiogram obtained for stroke pathway  Pericardial effusion noted, seen in consult by cardiology-asymptomatic, continue outpatient follow-up    Abnormality of thoracic aorta  Assessment & Plan  4.0 cm ectasia of the ascending thoracic aorta  Repeat ct chest in 12 months     URI (upper respiratory infection)  Assessment & Plan  Covid/flu/rsv negative  Will start flonase and nasal saline     Tobacco dependence  Assessment & Plan  Encourage pt to stop smoking  Will add nicotine patch     Disease of thyroid gland  Assessment & Plan  Continue synthroid       Medical Problems       Resolved Problems  Date Reviewed: 2/14/2024   None       Discharging Physician / Practitioner: Ruth Mahan PA-C  PCP: Charissa Carpenter MD  Admission Date:   Admission Orders (From admission, onward)       Ordered        02/13/24 1412  Place in Observation  Once                          Discharge Date: 02/14/24    Consultations During Hospital Stay:  Neurology    Procedures Performed:   MRI brain wo  contrast    Result Date: 2/14/2024  Impression: No evidence of acute infarct, intracranial hemorrhage or mass.     CTA stroke alert (head/neck)    Result Date: 2/13/2024  Impression: Negative CTA head and neck for large vessel occlusion, dissection, aneurysm, or high-grade stenosis. 4.0 cm ectasia of ascending thoracic aorta. Recommend follow-up CT chest in 12 months.    CT stroke alert brain    Result Date: 2/13/2024  Impression: No acute intracranial abnormality given beam-hardening streak artifact in the lonnie.       Significant Findings / Test Results:   See above    Incidental Findings:   Ectasia of ascending thoracic aorta    Test Results Pending at Discharge (will require follow up):   None     Outpatient Tests Requested:  BP slightly high PCP follow-up    Complications: None    Reason for Admission: Acute encephalopathy    Hospital Course:   Wilma Rowe is a 58 y.o. female patient who originally presented to the hospital on 2/13/2024 due to increased confusion.  Patient presented with confusion after taking NyQuil the night prior had been struggling snow in the morning.  She notes she became acutely confused and could not remember what was happening throughout the day.  She was a stroke alert in the emergency room and seen in consult by neurology.  She underwent MRI which was negative for acute CVA.  She was diagnosed with transient global amnesia.  Her symptoms completely resolved and she was back to her normal mental state on day of discharge.  She was cleared for discharge home by physical and Occupational Therapy.  She was advised to follow-up with PCP.       Please see above list of diagnoses and related plan for additional information.     Condition at Discharge: stable    Discharge Day Visit / Exam:   Subjective: Patient notes she is feeling much better today.  Notes she does not remember much that happened yesterday.  Feels comfortable returning home.  Vitals: Blood Pressure: 118/76 (02/14/24  "1550)  Pulse: 76 (02/14/24 1550)  Temperature: 98 °F (36.7 °C) (02/14/24 1550)  Temp Source: Temporal (02/14/24 1550)  Respirations: 16 (02/14/24 1550)  Height: 5' 5\" (165.1 cm) (02/14/24 0922)  Weight - Scale: 66.7 kg (147 lb) (02/14/24 0922)  SpO2: 94 % (02/14/24 1550)  Exam:   Physical Exam  Vitals reviewed.   Constitutional:       General: She is not in acute distress.  HENT:      Head: Normocephalic and atraumatic.   Eyes:      General: No scleral icterus.     Conjunctiva/sclera: Conjunctivae normal.   Cardiovascular:      Rate and Rhythm: Normal rate and regular rhythm.      Heart sounds: No murmur heard.  Pulmonary:      Effort: Pulmonary effort is normal. No respiratory distress.      Breath sounds: Normal breath sounds. No wheezing.   Abdominal:      General: Bowel sounds are normal. There is no distension.      Palpations: Abdomen is soft.      Tenderness: There is no abdominal tenderness.   Musculoskeletal:      Cervical back: Neck supple.      Right lower leg: No edema.      Left lower leg: No edema.   Skin:     General: Skin is warm and dry.   Neurological:      General: No focal deficit present.      Mental Status: She is alert and oriented to person, place, and time.   Psychiatric:         Mood and Affect: Mood normal.         Behavior: Behavior normal.          Discussion with Family: Updated  (significant other) at bedside.    Discharge instructions/Information to patient and family:   See after visit summary for information provided to patient and family.      Provisions for Follow-Up Care:  See after visit summary for information related to follow-up care and any pertinent home health orders.      Mobility at time of Discharge:   Basic Mobility Inpatient Raw Score: 24  JH-HLM Goal: 8: Walk 250 feet or more  JH-HLM Achieved: 7: Walk 25 feet or more  HLM Goal NOT achieved. Continue to encourage mobility in post discharge setting.     Disposition:   Home    Planned Readmission: None   "   Discharge Statement:  I spent 35 minutes discharging the patient. This time was spent on the day of discharge. I had direct contact with the patient on the day of discharge. Greater than 50% of the total time was spent examining patient, answering all patient questions, arranging and discussing plan of care with patient as well as directly providing post-discharge instructions.  Additional time then spent on discharge activities.    Discharge Medications:  See after visit summary for reconciled discharge medications provided to patient and/or family.      **Please Note: This note may have been constructed using a voice recognition system**

## 2024-02-14 NOTE — PROGRESS NOTES
[facility]  Progress Note  Name: Wilma Rowe I  MRN: [id number]  Unit/Bed#: E4 -01 I Date of Admission: 2/13/2024   Date of Service: 2/14/2024 I Hospital Day: 0    Assessment/Plan   * Acute encephalopathy  Assessment & Plan  She had been in her normal state of health except for compliant of uri symptoms in which she took nyquil  She woke up without any issues this am and then around 10am she became confused with slurred speech  Stroke alert in ed was called  Cta head and neck and ct head were negative  Notes complete resolution in her confusion this morning  Likely related to NyQuil versus significant exertion with shoveling snow  Ua negative  Covid/flu/rsv negative  Placed on the stroke pathway  Neurology evaluation, likely transient global amnesia  MRI brain negative  No indication to continue aspirin per neurology    Pericardial effusion  Assessment & Plan  Echocardiogram obtained for stroke pathway  Pericardial effusion noted, will consult cardiology for recommendations and evaluation    Abnormality of thoracic aorta  Assessment & Plan  4.0 cm ectasia of the ascending thoracic aorta  Repeat ct chest in 12 months     URI (upper respiratory infection)  Assessment & Plan  Covid/flu/rsv negative  Will start flonase and nasal saline     Tobacco dependence  Assessment & Plan  Encourage pt to stop smoking  Will add nicotine patch     Disease of thyroid gland  Assessment & Plan  Continue synthroid              VTE Pharmacologic Prophylaxis: VTE Score: 6 High Risk (Score >/= 5) - Pharmacological DVT Prophylaxis Ordered: enoxaparin (Lovenox). Sequential Compression Devices Ordered.    Mobility:   Basic Mobility Inpatient Raw Score: 24  JH-HLM Goal: 8: Walk 250 feet or more  JH-HLM Achieved: 7: Walk 25 feet or more  HLM Goal NOT achieved. Continue with multidisciplinary rounding and encourage appropriate mobility to improve upon HLM goals.    Patient Centered Rounds: I performed bedside  rounds with nursing staff today.   Discussions with Specialists or Other Care Team Provider: neurology     Education and Discussions with Family / Patient: Updated  (significant other) at bedside.    Total Time Spent on Date of Encounter in care of patient: This time was spent on one or more of the following: performing physical exam; counseling and coordination of care; obtaining or reviewing history; documenting in the medical record; reviewing/ordering tests, medications or procedures; communicating with other healthcare professionals and discussing with patient's family/caregivers.    Current Length of Stay: 0 day(s)  Current Patient Status: Observation   Certification Statement: The patient, admitted on an observation basis, will now require > 2 midnight hospital stay due to pericardial effusion pending cardiology evaluation  Discharge Plan: Anticipate discharge later today or tomorrow to home.    Code Status: Level 1 - Full Code    Subjective:   Patient seen and examined at bedside.  Notes she feels much better today than she did yesterday.  Notes she does not remember much of what happened yesterday but feels back to baseline.    Objective:     Vitals:   Temp (24hrs), Av.1 °F (36.7 °C), Min:97 °F (36.1 °C), Max:99.2 °F (37.3 °C)    Temp:  [97 °F (36.1 °C)-99.2 °F (37.3 °C)] 97.5 °F (36.4 °C)  HR:  [71-83] 71  Resp:  [18-20] 19  BP: (103-134)/(60-90) 123/79  SpO2:  [95 %-99 %] 95 %  Body mass index is 24.46 kg/m².     Input and Output Summary (last 24 hours):     Intake/Output Summary (Last 24 hours) at 2024 1445  Last data filed at 2024 1142  Gross per 24 hour   Intake 600 ml   Output --   Net 600 ml       Physical Exam:   Physical Exam  Vitals reviewed.   Constitutional:       General: She is not in acute distress.  HENT:      Head: Normocephalic and atraumatic.   Eyes:      General: No scleral icterus.     Conjunctiva/sclera: Conjunctivae normal.   Cardiovascular:      Rate and  Rhythm: Normal rate and regular rhythm.      Heart sounds: No murmur heard.  Pulmonary:      Effort: Pulmonary effort is normal.      Breath sounds: Normal breath sounds. No wheezing.   Abdominal:      General: Bowel sounds are normal. There is no distension.      Palpations: Abdomen is soft.      Tenderness: There is no abdominal tenderness.   Musculoskeletal:      Cervical back: Neck supple.      Right lower leg: No edema.      Left lower leg: No edema.   Skin:     General: Skin is warm and dry.   Neurological:      Mental Status: She is alert and oriented to person, place, and time.   Psychiatric:         Mood and Affect: Mood normal.         Behavior: Behavior normal.          Additional Data:     Labs:  Results from last 7 days   Lab Units 02/14/24  0442   WBC Thousand/uL 5.71   HEMOGLOBIN g/dL 12.8   HEMATOCRIT % 39.2   PLATELETS Thousands/uL 187     Results from last 7 days   Lab Units 02/14/24  0442   SODIUM mmol/L 140   POTASSIUM mmol/L 3.5   CHLORIDE mmol/L 107   CO2 mmol/L 27   BUN mg/dL 23   CREATININE mg/dL 0.67   ANION GAP mmol/L 6   CALCIUM mg/dL 9.1   GLUCOSE RANDOM mg/dL 97     Results from last 7 days   Lab Units 02/13/24  1319   INR  0.96     Results from last 7 days   Lab Units 02/13/24  1312   POC GLUCOSE mg/dl 92     Results from last 7 days   Lab Units 02/14/24  0442   HEMOGLOBIN A1C % 5.8*           Lines/Drains:  Invasive Devices       Peripheral Intravenous Line  Duration             Peripheral IV 02/13/24 Right Antecubital 1 day                      Telemetry:  Telemetry Orders (From admission, onward)               24 Hour Telemetry Monitoring  Continuous x 24 Hours (Telem)        Expiring   Question:  Reason for 24 Hour Telemetry  Answer:  TIA/Suspected CVA/ Confirmed CVA                     Telemetry Reviewed: Normal Sinus Rhythm  Indication for Continued Telemetry Use: Kp/reed/endocarditis             Imaging: Reviewed radiology reports from this admission including: ECHO    Recent  Cultures (last 7 days):         Last 24 Hours Medication List:   Current Facility-Administered Medications   Medication Dose Route Frequency Provider Last Rate    acetaminophen  650 mg Oral Q4H PRN Tori Ambron, DO      aspirin  81 mg Oral Daily Tori Ambron, DO      atorvastatin  40 mg Oral QPM Tori Ambron, DO      enoxaparin  40 mg Subcutaneous Daily Tori Ambron, DO      fluticasone  1 spray Nasal Daily Tori Ambron, DO      levothyroxine  100 mcg Oral Early Morning Tori Ambron, DO      magnesium Oxide  400 mg Oral Daily Tori Ambron, DO      nicotine  1 patch Transdermal Daily Tori Ambron, DO      ondansetron  4 mg Intravenous Q6H PRN Tori Ambron, DO      sodium chloride  1 spray Each Nare Q1H PRN Tori Ambron, DO          Today, Patient Was Seen By: Ruth Mahan PA-C    **Please Note: This note may have been constructed using a voice recognition system.**

## 2024-02-14 NOTE — PHYSICAL THERAPY NOTE
PHYSICAL THERAPY EVALUATION          Patient Name: Wilma Rowe  Today's Date: 2/14/2024 02/14/24 1000   PT Last Visit   PT Visit Date 02/14/24   Note Type   Note type Evaluation   Pain Assessment   Pain Assessment Tool 0-10   Pain Score No Pain   Restrictions/Precautions   Other Precautions Telemetry   Home Living   Type of Home House   Home Layout 1/2 bath on main level;Bed/bath upstairs;Stairs to enter without rails;Laundry in basement;Multi-level   Bathroom Shower/Tub Tub/shower unit   Bathroom Toilet Standard   Additional Comments 2 KAYCEE. 1st fl set up   Prior Function   Level of [place] Independent with ADLs;Independent with functional mobility;Independent with IADLS   Lives With Daughter   IADLs Independent with driving;Independent with meal prep;Independent with medication management   Falls in the last 6 months 0   Vocational Full time employment   General   Additional Pertinent History pt admitted 2/13/24 for acute encephalopathy. up and oob orders.   Family/Caregiver Present Yes   Cognition   Overall Cognitive Status WFL   Arousal/Participation Cooperative   Orientation Level Oriented X4   Memory Within functional limits   Following Commands Follows all commands and directions without difficulty   RLE Assessment   RLE Assessment WFL   LLE Assessment   LLE Assessment WFL   Coordination   Sensation WFL   Bed Mobility   Supine to Sit 6  Modified independent   Additional items HOB elevated   Transfers   Sit to Stand 7  Independent   Stand to Sit 7  Independent   Ambulation/Elevation   Gait pattern WNL   Gait Assistance 7  Independent   Assistive Device None   Distance 130'   Balance   Static Standing Fair +   Dynamic Standing Fair   Ambulatory Fair   Endurance Deficit   Endurance Deficit No   Activity Tolerance   Activity Tolerance Patient tolerated treatment well   Medical Staff Made Aware OT; neuro   Assessment   Prognosis Good   Assessment Wilma Rowe is a 58 y.o. female  admitted to [facility] on 2/13/2024 for Acute encephalopathy. PT was consulted and pt was seen on 2/14/2024 for mobility assessment and d/c planning. Pt presents w telemetry, low fall risk. At baseline is indep without an AD. Pt is currently functioning at an [facility] for bed mobility, transfers and ambulation without an AD. Pt demonstrated ability to ambulate limited community distances without difficulty. Appears to be functioning at baseline w admitting sx resolved. The patient's AM-PAC Basic Mobility Inpatient Short Form Raw Score is 24. A Raw score of greater than 16 suggests the patient may benefit from discharge to home.   Barriers to Discharge None   Plan   PT Frequency   (d/c PT)   Discharge Recommendation   Rehab Resource Intensity Level, PT No post-acute rehabilitation needs   AM-PAC Basic Mobility Inpatient   Turning in Flat Bed Without Bedrails 4   Lying on Back to Sitting on Edge of Flat Bed Without Bedrails 4   Moving Bed to Chair 4   Standing Up From Chair Using Arms 4   Walk in Room 4   Climb 3-5 Stairs With Railing 4   Basic Mobility Inpatient Raw Score 24   Basic Mobility Standardized Score 57.68   Highest Level Of Mobility   JH-HLM Goal 8: Walk 250 feet or more   JH-HLM Achieved 7: Walk 25 feet or more   End of Consult   Patient Position at End of Consult Standing     History: current experience including fall risk  Exam: impairments in systems including multiple body structures involved; neuromuscular (balance, gait, transfers), strength, am-pac, cognition  Clinical: stable/unpredictable  Complexity: low    Tomeka Tucker, PT

## 2024-02-14 NOTE — INCIDENTAL FINDINGS
The following findings require follow up:  Radiographic finding   Findin.0 cm ectasia of ascending thoracic aorta   Follow up required: repeat CT chest    Follow up should be done within 12 month(s)    Please notify the following clinician to assist with the follow up:   PCP

## 2024-02-14 NOTE — PLAN OF CARE
Problem: PAIN - ADULT  Goal: Verbalizes/displays adequate comfort level or baseline comfort level  Description: Interventions:  - Encourage patient to monitor pain and request assistance  - Assess pain using appropriate pain scale  - Administer analgesics based on type and severity of pain and evaluate response  - Implement non-pharmacological measures as appropriate and evaluate response  - Consider cultural and social influences on pain and pain management  - Notify physician/advanced practitioner if interventions unsuccessful or patient reports new pain  Outcome: Progressing     Problem: INFECTION - ADULT  Goal: Absence or prevention of progression during hospitalization  Description: INTERVENTIONS:  - Assess and monitor for signs and symptoms of infection  - Monitor lab/diagnostic results  - Monitor all insertion sites, i.e. indwelling lines, tubes, and drains  - Monitor endotracheal if appropriate and nasal secretions for changes in amount and color  - [place] appropriate cooling/warming therapies per order  - Administer medications as ordered  - Instruct and encourage patient and family to use good hand hygiene technique  - Identify and instruct in appropriate isolation precautions for identified infection/condition  Outcome: Progressing     Problem: METABOLIC, FLUID AND ELECTROLYTES - ADULT  Goal: Electrolytes maintained within normal limits  Description: INTERVENTIONS:  - Monitor labs and assess patient for signs and symptoms of electrolyte imbalances  - Administer electrolyte replacement as ordered  - Monitor response to electrolyte replacements, including repeat lab results as appropriate  - Instruct patient on fluid and nutrition as appropriate  Outcome: Progressing     Problem: NEUROSENSORY - ADULT  Goal: Achieves stable or improved neurological status  Description: INTERVENTIONS  - Monitor and report changes in neurological status  - Monitor vital signs such as temperature, blood pressure, glucose,  and any other labs ordered   - Initiate measures to prevent increased intracranial pressure  - Monitor for seizure activity and implement precautions if appropriate      Outcome: Progressing     Problem: Knowledge Deficit  Goal: Patient/family/caregiver demonstrates understanding of disease process, treatment plan, medications, and discharge instructions  Description: Complete learning assessment and assess knowledge base.  Interventions:  - Provide teaching at level of understanding  - Provide teaching via preferred learning methods  Outcome: Progressing     Problem: DISCHARGE PLANNING  Goal: Discharge to home or other facility with appropriate resources  Description: INTERVENTIONS:  - Identify barriers to discharge w/patient and caregiver  - Arrange for needed discharge resources and transportation as appropriate  - Identify discharge learning needs (meds, wound care, etc.)  - Arrange for interpretive services to assist at discharge as needed  - Refer to Case Management Department for coordinating discharge planning if the patient needs post-hospital services based on physician/advanced practitioner order or complex needs related to functional status, cognitive ability, or social support system  Outcome: Progressing

## 2024-02-14 NOTE — QUICK NOTE
Patient presenting with acute change in mentation with difficulty recalling information about herself and family members, current events, or events about the day. This occurred after shoveling snow outside with exertion. She was initially a stroke alert with an NIHSS of 0. Not a candidate for TNK due to low suspicion for stroke and low NIHSS. Later in the afternoon on reevaluation it was more apparent that she was having difficulty encoding new memories and recalling recent information consistent with an anterograde amnesia starting that morning. Today, she is doing much better and feels back to normal. She is able to correctly identify current events, name family members in the room, remembers conversations from yesterday afternoon. She still has a lapse of time that she does not recall starting yesterday morning and into the afternoon. Some of those details are filling in. The remainder of her exam today is non-focal.  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 We reviewed the diagnosis and suspected pathophysiology with patient and her family. We also discussed that this does not increase her risk for stroke in the future but may increase her risk of having another event of TGA at some point. Her stroke workup was unrevealing including normal MRI brain. She started ASA on her own. From our perspective she does not need ASA but advised to talk to her PCP if she needs to take it long term for other reasons. Neurology will sign off at this time. Please contact with questions or concerns.    Wilma WOOD Soledad will need follow up with general neurology in 4-8 weeks with attending/AP/resident. She will not require additional neurological testing as an outpatient.

## 2024-02-14 NOTE — ASSESSMENT & PLAN NOTE
She had been in her normal state of health except for compliant of uri symptoms in which she took nyquil  She woke up without any issues this am and then around 10am she became confused with slurred speech  Stroke alert in ed was called  Cta head and neck and ct head were negative  Notes complete resolution in her confusion this morning  Likely related to NyQuil versus significant exertion with shoveling snow  Ua negative  Covid/flu/rsv negative  Placed on the stroke pathway  Neurology evaluation, likely transient global amnesia  MRI brain negative  No indication to continue aspirin per neurology

## 2024-02-26 NOTE — PROGRESS NOTES
Cardiology Follow Up    Wilma Rowe  1965  [id number]  [facility] [place]  [address] [facility]  [place] [id number] [phone]    1. Pericardial effusion  Echo follow up/limited w/ contrast if indicated      2. Claudication of lower extremity (HCC)        3. Hypothyroidism, unspecified type        4. Tobacco dependence            Interval History:   Ms Wilma Rowe was admitted to [facility] on 2/13 - 2/14/24 with acute encephalitis.  Wilma presented to the emergency room with increased confusion.  She had taken NyQuil and had been shoveling snow in the morning.  She noted to be acutely confused cannot remember what was happening throughout the day.  She did the ED and stroke alert was called.  Neurology consulted.  She underwent an MRI which was negative for an acute CVA.  Was diet noticed with transient global amnesia.  Her symptoms completely resolved.  TTE Showed LVEF 65%, grade 1 Diastolic dysfunction.  Normal RV size and systolic function.  Trace MR, trace TR, varying degrees small to moderate pericardial effusion noted anterior to right ventricle with maximal effusion close to the right ventricular apex.  No echo graphic evidence of cardiac tamponade.  Ascending aortic ectasia 4.0 cm.  Cardiology was consulted.  Recent oral symptoms with runny nose.  History of recurrent effusion in the past.  She was discharged home.      Wilma presents to our office for recent hospitalization follow-up visit.  She is accompanied by her .  Wilma has stopped smoking as find very difficult to continue with smoking sensation.  She denies dyspnea with minimal or morning exertion chest pain palpitation lightheadedness or dizziness.  She admits to pain in both lower extremities with going up and down stairs multiple times a day.    Medical History   Claudication  Hypothyroidism TSH 1.39   Tobacco dependence   Pre Diabetic  HgbA1C 5.8 on 2/14/24 2/14/24 , , HDL 79, LDL 69   Brother with CAD, denies CAD in mother or father   Patient Active Problem List   Diagnosis    Incomplete uterovaginal prolapse    Urethral hypermobility    Disease of thyroid gland    Tobacco dependence    Dysphonia    Bilateral vocal fold paresis    Gluten intolerance    Gastroesophageal reflux disease    Pharyngoesophageal dysphagia    Reflux laryngitis    Muscle tension dysphonia    Moncho's edema of vocal folds    Hx of total thyroidectomy    Nasal obstruction    Urge incontinence    Dense breast tissue on mammogram    Vaginal atrophy    Acute encephalopathy    URI (upper respiratory infection)    Stroke-like symptoms    Abnormality of thoracic aorta    Pericardial effusion     Past Medical History:   Diagnosis Date    Colon polyp     Disease of thyroid gland     Urinary incontinence      Social History     Socioeconomic History    Marital status: /Civil Union     Spouse name: Not on file    Number of children: Not on file    Years of education: Not on file    Highest education level: Not on file   Occupational History    Not on file   Tobacco Use    Smoking status: Every Day     Types: Cigarettes    Smokeless tobacco: Never    Tobacco comments:     1/4 pack a day.   Vaping Use    Vaping status: Never Used   Substance and Sexual Activity    Alcohol use: Yes     Comment: socially    Drug use: Not Currently    Sexual activity: Not Currently     Birth control/protection: Surgical     Comment: Ablation   Other Topics Concern    Not on file   Social History Narrative    Not on file     Social Determinants of Health     Financial Resource Strain: Not on file   Food Insecurity: Food Insecurity Present (2/13/2024)    Hunger Vital Sign     Worried About Running Out of Food in the Last Year: Sometimes true     Ran Out of Food in the Last Year: Sometimes true   Transportation Needs: No Transportation Needs (2/13/2024)    PRAPARE - Transportation      Lack of Transportation (Medical): No     Lack of Transportation (Non-Medical): No   Physical Activity: Not on file   Stress: Not on file   Social Connections: Not on file   Intimate Partner Violence: Not on file   Housing Stability: Low Risk  (2/13/2024)    Housing Stability Vital Sign     Unable to Pay for Housing in the Last Year: No     Number of Places Lived in the Last Year: 1     Unstable Housing in the Last Year: No      No family history on file.  Past Surgical History:   Procedure Laterality Date    COLONOSCOPY      LASER ABLATION OF THE CERVIX  2008    HI ANTERIOR COLPORRAPHY RPR CYSTOCELE W/CYSTO N/A 7/14/2021    Procedure: Cystocele Repair, Sacrospinous Ligament fixation;  Surgeon: Carlos Sharp MD;  Location: [facility];  Service: Gynecology    HI CYSTOURETHROSCOPY N/A 7/14/2021    Procedure: CYSTOSCOPY;  Surgeon: Carlos Sharp MD;  [facility];  Service: Gynecology    HI SLING OPERATION STRESS INCONTINENCE N/A 7/14/2021    Procedure: INSERTION PUBOVAGINAL SLING (FEMALE);  Surgeon: Carlos Sharp MD;  Location: [facility];  Service: Gynecology    THYROIDECTOMY         Current Outpatient Medications:     fluticasone (FLONASE) 50 mcg/act nasal spray, 1 spray into each nostril daily, Disp: 11.1 mL, Rfl: 0    levothyroxine 100 mcg tablet, Take 100 mcg by mouth daily, Disp: , Rfl:     Magnesium 500 MG CAPS, Take by mouth daily at bedtime, Disp: , Rfl:     Multiple Vitamins-Minerals (ALIVE MULTI-VITAMIN PO), Take by mouth daily at bedtime, Disp: , Rfl:     naproxen (NAPROSYN) 500 mg tablet, Take 1 tablet (500 mg total) by mouth 2 (two) times a day with meals, Disp: 30 tablet, Rfl: 0    TURMERIC PO, Take by mouth daily at bedtime, Disp: , Rfl:   Allergies   Allergen Reactions    Morphine Palpitations and Other (See Comments)     And itching       Labs:  Admission on 02/13/2024, Discharged on 02/14/2024   Component Date Value    POC Glucose 02/13/2024 92     Ventricular Rate 02/13/2024 77     Atrial  Rate 02/13/2024 77     MD Interval 02/13/2024 150     QRSD Interval 02/13/2024 74     QT Interval 02/13/2024 382     QTC Interval 02/13/2024 432     P Axis 02/13/2024 74     QRS [place] 02/13/2024 68     T Wave [place] 02/13/2024 79     Sodium 02/13/2024 139     Potassium 02/13/2024 3.9     Chloride 02/13/2024 106     CO2 02/13/2024 27     ANION GAP 02/13/2024 6     BUN 02/13/2024 11     Creatinine 02/13/2024 0.66     Glucose 02/13/2024 91     Calcium 02/13/2024 9.4     eGFR 02/13/2024 97     hs TnI 0hr 02/13/2024 5     WBC 02/13/2024 8.52     RBC 02/13/2024 4.88     Hemoglobin 02/13/2024 13.9     Hematocrit 02/13/2024 42.7     MCV 02/13/2024 88     MCH 02/13/2024 28.5     MCHC 02/13/2024 32.6     RDW 02/13/2024 14.4     Platelets 02/13/2024 208     MPV 02/13/2024 11.1     Protime 02/13/2024 12.9     INR 02/13/2024 0.96     PTT 02/13/2024 27     SARS-CoV-2 02/13/2024 Negative     INFLUENZA A PCR 02/13/2024 Negative     INFLUENZA B PCR 02/13/2024 Negative     RSV PCR 02/13/2024 Negative     Color, UA 02/13/2024 Yellow     Clarity, UA 02/13/2024 Clear     pH, UA 02/13/2024 6.0     Leukocytes, UA 02/13/2024 Negative     Nitrite, UA 02/13/2024 Negative     Protein, UA 02/13/2024 Negative     Glucose, UA 02/13/2024 Negative     Ketones, UA 02/13/2024 Negative     Urobilinogen, UA 02/13/2024 0.2     Bilirubin, UA 02/13/2024 Negative     Occult Blood, UA 02/13/2024 Large (A)     Specific [place], UA 02/13/2024 <=1.005     RBC, UA 02/13/2024 4-10 (A)     WBC, UA 02/13/2024 None Seen     Epithelial Cells 02/13/2024 Occasional     Bacteria, UA 02/13/2024 None Seen     BSA 02/14/2024 1.74     A4C EF 02/14/2024 70     LV Diastolic Volume (BP) 02/14/2024 49     LV Systolic Volume (BP) 02/14/2024 13     EF 02/14/2024 73     LVIDd 02/14/2024 3.70     LVIDS 02/14/2024 2.30     IVSd 02/14/2024 0.80     LVPWd 02/14/2024 0.80     FS 02/14/2024 38     MV E' Tissue Velocity Se* 02/14/2024 8     LA Volume Index (BP) 02/14/2024 20.1     E/A  ratio 02/14/2024 0.78     E wave deceleration time 02/14/2024 204     MV Peak E Quinten 02/14/2024 59     MV Peak A Quinten 02/14/2024 0.76     RVID d 02/14/2024 3.6     Tricuspid annular plane * 02/14/2024 2.20     LA size 02/14/2024 3.1     LA length (A2C) 02/14/2024 4.50     LA volume (BP) 02/14/2024 35     RAA A4C 02/14/2024 13.4     MV stenosis pressure 1/2* 02/14/2024 59     MV valve area p 1/2 meth* 02/14/2024 3.73     TR Peak Quinten 02/14/2024 2.0     Triscuspid Valve Regurgi* 02/14/2024 16.0     Ao root 02/14/2024 2.40     Asc Ao 02/14/2024 4     Sinus of Valsalva, 2D 02/14/2024 3.5     Tricuspid valve peak reg* 02/14/2024 2.01     Left ventricular stroke * 02/14/2024 41.00     IVS 02/14/2024 0.8     LEFT VENTRICLE SYSTOLIC * 02/14/2024 18     LV DIASTOLIC VOLUME (MOD* 02/14/2024 59     Left Atrium Area-systoli* 02/14/2024 14.7     Left Atrium Area-systoli* 02/14/2024 13.7     LVSV, 2D 02/14/2024 41     LV Diastolic Volume Inde* 02/14/2024 28.2     LV Systolic Volume Index* 02/14/2024 7.5     Platelets 02/13/2024 198     MPV 02/13/2024 10.9     Hepatitis C Ab 02/13/2024 Non-reactive     Cholesterol 02/14/2024 174     Triglycerides 02/14/2024 129     HDL, Direct 02/14/2024 79     LDL Calculated 02/14/2024 69     Hemoglobin A1C 02/14/2024 5.8 (H)     EAG 02/14/2024 120     Sodium 02/14/2024 140     Potassium 02/14/2024 3.5     Chloride 02/14/2024 107     CO2 02/14/2024 27     ANION GAP 02/14/2024 6     BUN 02/14/2024 23     Creatinine 02/14/2024 0.67     Glucose 02/14/2024 97     Glucose, Fasting 02/14/2024 97     Calcium 02/14/2024 9.1     eGFR 02/14/2024 96     WBC 02/14/2024 5.71     RBC 02/14/2024 4.50     Hemoglobin 02/14/2024 12.8     Hematocrit 02/14/2024 39.2     MCV 02/14/2024 87     MCH 02/14/2024 28.4     MCHC 02/14/2024 32.7     RDW 02/14/2024 14.4     Platelets 02/14/2024 187     MPV 02/14/2024 11.2      Imaging: Echo complete w/ contrast if indicated    Result Date: 2/14/2024  Narrative: Images from the  original result were not included.   Pericardium: There is a pericardial effusion. Technically adequate transthoracic echocardiogram study. Normal left ventricular size and systolic function, early, compensated grade 1 diastolic dysfunction.  Left ventricular ejection fraction is estimated as around 65%. Normal right ventricle size and systolic function. Normal left and right atrial cavity size. Mild aortic valve sclerosis, no aortic valve stenosis or regurgitation. Mild mitral valve sclerosis, trace mitral valve regurgitation. Trace tricuspid valve regurgitation. No obvious pulmonary hypertension. Varying degrees of small to moderate pericardial effusion noted anterior to the right ventricle with maximal effusion noted close to right ventricle apex.  There are no echocardiographic features of cardiac tamponade. Ectasia of ascending aorta measuring up to 4.0 cm. No previous echocardiogram is available for comparison.      MRI brain wo contrast    Result Date: 2/14/2024  Narrative: MRI BRAIN WITHOUT CONTRAST INDICATION: Altered mental status. COMPARISON: 2/13/2024. TECHNIQUE:  Multiplanar, multisequence imaging of the brain was performed. IMAGE QUALITY:  Diagnostic. FINDINGS: BRAIN PARENCHYMA: Few periventricular and subcortical punctate T2/FLAIR hyperintense foci suggesting early microangiopathic change. No restricted diffusion. No acute intracranial hemorrhage or mass effect. VENTRICLES: No hydrocephalus or extra-axial collection. SELLA AND PITUITARY GLAND:  Normal. ORBITS: Intact. PARANASAL SINUSES:  Normal. VASCULATURE:  Evaluation of the major intracranial vasculature demonstrates appropriate flow voids. CALVARIUM AND SKULL BASE:  Normal. EXTRACRANIAL SOFT TISSUES:  Normal.     Impression: No evidence of acute infarct, intracranial hemorrhage or mass. Workstation performed: [id number]     CTA stroke alert (head/neck)    Result Date: 2/13/2024  Narrative: CTA NECK AND BRAIN WITH CONTRAST INDICATION: Stroke Alert  COMPARISON:   Same day CT stroke alert brain. CT head and CT cervical spine without contrast 5/28/2022. TECHNIQUE:   Post contrast imaging was performed after administration of iodinated contrast through the neck and brain. Post contrast axial 0.625 mm images timed to opacify the arterial system. 3D rendering was performed on an independent workstation.   MIP reconstructions performed. Coronal reconstructions were performed of the noncontrast portion of the brain. Radiation dose length product (DLP) for this visit:  685 mGy-cm .  This examination, like all CT scans performed in the [facility], was performed utilizing techniques to minimize radiation dose exposure, including the use of iterative reconstruction and automated exposure control. IV Contrast:  85 mL of iohexol (OMNIPAQUE) IMAGE QUALITY:   Diagnostic FINDINGS: CERVICAL VASCULATURE AORTIC ARCH AND GREAT VESSELS: 4.0 cm ectasia of ascending thoracic aorta (2:302). Minor calcified atherosclerotic disease of aortic arch. Normal great vessel origins. Normal visualized subclavian vessels. RIGHT VERTEBRAL ARTERY CERVICAL SEGMENT:  Normal origin. The vessel is mildly hypoplastic in caliber throughout the neck. LEFT VERTEBRAL ARTERY CERVICAL SEGMENT:  Normal origin. The vessel is normal and dominant in caliber throughout the neck. RIGHT EXTRACRANIAL CAROTID SEGMENT:  Normal caliber common carotid artery is patent given motion artifact in the mid-to-lower neck.  Normal bifurcation and cervical internal carotid artery.  No stenosis or dissection given motion artifact. LEFT EXTRACRANIAL CAROTID SEGMENT:  Normal caliber common carotid artery is patent given motion artifact in the mid-to-lower neck.  Normal bifurcation and cervical internal carotid artery.  No stenosis or dissection given motion artifact. NASCET criteria was used to determine the degree of internal carotid artery diameter stenosis. INTRACRANIAL VASCULATURE INTERNAL CAROTID ARTERIES:   Normal enhancement of the intracranial portions of the internal carotid arteries.  Normal ophthalmic artery origins.  Normal ICA terminus. ANTERIOR CIRCULATION: Aplastic right A1 segment, normal variant. Normal caliber left A1 segment. Anterior cerebral arteries with normal enhancement.  Normal anterior communicating artery. MIDDLE CEREBRAL ARTERY CIRCULATION:  M1 segment and middle cerebral artery branches demonstrate normal enhancement bilaterally. DISTAL VERTEBRAL ARTERIES: Patent mildly hypoplastic right and dominant left distal vertebral arteries. Mildly hypoplastic right vertebral artery becomes more diminutive after PICA takeoff. Posterior inferior cerebellar artery origins are normal. Normal vertebral basilar junction. BASILAR ARTERY:  Basilar artery is normal in caliber.  Normal superior cerebellar arteries. POSTERIOR CEREBRAL ARTERIES: Both posterior cerebral arteries arises from the basilar tip.  Both arteries demonstrate normal enhancement. VENOUS STRUCTURES:  Normal. NON VASCULAR ANATOMY BONY STRUCTURES:  No acute osseous abnormality. 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 Multilevel degenerative changes, moderate at C5-C6. SOFT TISSUES OF THE NECK: Postsurgical changes of right hemithyroidectomy. 0.7 cm hypodense nodule left thyroid lobe. Incidental discovery of one or more thyroid nodule(s) measuring less than 1.5 cm and without suspicious features is noted in this patient who is above 35 years old; according to guidelines published in the February 2015 white paper on incidental thyroid nodules in the Journal of the American College of Radiology (JACR), no further evaluation is recommended. THORACIC INLET: Mild biapical fibrotic change. No focal consolidation visualized upper lung zone. Tiny calcified granuloma left upper lobe. 4.0 cm ectasia of ascending thoracic aorta (2:302).     Impression: Negative CTA head and neck for large vessel occlusion, dissection, aneurysm, or  high-grade stenosis. 4.0 cm ectasia of ascending thoracic aorta. Recommend follow-up CT chest in 12 months. Additional chronic/incidental findings as detailed above. Findings were directly discussed with Tanesha Purdy at approximately 1:28 PM. Workstation performed: [id number]     CT stroke alert brain    Result Date: 2/13/2024  Narrative: CT BRAIN - STROKE ALERT PROTOCOL INDICATION:   Stroke Alert. COMPARISON: Pending same day CTA stroke alert head and neck. CT head without contrast 5/28/2022. TECHNIQUE:  CT examination of the brain was performed.  In addition to axial images, coronal reformatted images were created and submitted for interpretation. Radiation dose length product (DLP) for this visit:  903 mGy-cm .  This examination, like all CT scans performed in the [facility], was performed utilizing techniques to minimize radiation dose exposure, including the use of iterative reconstruction and automated exposure control. IMAGE QUALITY:  Diagnostic. Beam-hardening streak artifact in the lonnie. FINDINGS: PARENCHYMA:  No intracranial mass, mass effect or midline shift. No CT signs of acute infarction given beam-hardening streak artifact in the lonnie.  No acute parenchymal hemorrhage. Mild calcification of the cavernous internal carotid and left intradural vertebral arteries. VENTRICLES AND EXTRA-AXIAL SPACES:  Normal for the patient's age. VISUALIZED ORBITS: Normal visualized orbits. PARANASAL SINUSES: Normal visualized paranasal sinuses. CALVARIUM AND EXTRACRANIAL SOFT TISSUES:   Normal. 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     Impression: No acute intracranial abnormality given beam-hardening streak artifact in the lonnie. Findings were directly discussed with Tanesha Purdy at approximately 1:28 PM. Workstation performed: [id number]       Review of Systems:  Review of Systems   Musculoskeletal:  Positive for arthralgias and myalgias.        Lower extremities  with going up and down stairs multiple times a day   All other systems reviewed and are negative.      Physical Exam:  Physical Exam  Vitals reviewed.   Constitutional:       Appearance: Normal appearance.   Cardiovascular:      Rate and Rhythm: Normal rate and regular rhythm.      Pulses: Normal pulses.      Heart sounds: Normal heart sounds.   Pulmonary:      Effort: Pulmonary effort is normal.      Breath sounds: Normal breath sounds.   Musculoskeletal:         General: Normal range of motion.      Cervical back: Normal range of motion and neck supple.      Right lower leg: No edema.      Left lower leg: No edema.   Skin:     General: Skin is warm and dry.      Capillary Refill: Capillary refill takes less than 2 seconds.   Neurological:      General: No focal deficit present.      Mental Status: She is alert and oriented to person, place, and time.   Psychiatric:         Mood and Affect: Mood normal.         Behavior: Behavior normal.     BP RUE sitting 126/80     Discussion/Summary:  # Pericardial effusion, small to moderate pericardial effusion anterior to the right ventricle with maximal effusion noted close the right ventricle apex no echocardiographic features of cardiac tamponade.  Follow-up TTE in 1 month.  Denies chest pain or shortness of breath  # Claudication of sky LE, instructed to schedule pre ordered arterial duplex of lower extremity  # Hypothyroidism 10/09/23 TSH 1.39 continue on levothyroxine 100 mcg daily  # Tobacco dependence continues with smoking cessation, using Nicoderm Patch  # Pre Diabetic HgbA1C 5.8 on 2/14/24 counseled on avoiding concentrated sweets and consuming a low carbohydrate diet

## 2024-02-27 ENCOUNTER — OFFICE VISIT (OUTPATIENT)
Dept: CARDIOLOGY CLINIC | Facility: CLINIC | Age: 59
End: 2024-02-27
Payer: COMMERCIAL

## 2024-02-27 VITALS
OXYGEN SATURATION: 100 % | SYSTOLIC BLOOD PRESSURE: 134 MMHG | WEIGHT: 150.6 LBS | DIASTOLIC BLOOD PRESSURE: 90 MMHG | BODY MASS INDEX: 25.09 KG/M2 | HEART RATE: 77 BPM | HEIGHT: 65 IN

## 2024-02-27 DIAGNOSIS — I73.9 CLAUDICATION OF LOWER EXTREMITY (HCC): ICD-10-CM

## 2024-02-27 DIAGNOSIS — I31.39 PERICARDIAL EFFUSION: Primary | ICD-10-CM

## 2024-02-27 DIAGNOSIS — F17.200 TOBACCO DEPENDENCE: ICD-10-CM

## 2024-02-27 DIAGNOSIS — E03.9 HYPOTHYROIDISM, UNSPECIFIED TYPE: ICD-10-CM

## 2024-02-27 PROCEDURE — 99215 OFFICE O/P EST HI 40 MIN: CPT | Performed by: NURSE PRACTITIONER

## 2024-02-27 RX ORDER — NICOTINE 21 MG/24HR
1 PATCH, TRANSDERMAL 24 HOURS TRANSDERMAL EVERY 24 HOURS
COMMUNITY

## 2024-02-27 RX ORDER — ERGOCALCIFEROL (VITAMIN D2) 10 MCG
TABLET ORAL
COMMUNITY

## 2024-02-27 RX ORDER — ASPIRIN 81 MG/1
81 TABLET ORAL DAILY
COMMUNITY

## 2024-03-17 ENCOUNTER — HOSPITAL ENCOUNTER (EMERGENCY)
Facility: HOSPITAL | Age: 59
Discharge: HOME | End: 2024-03-17
Attending: EMERGENCY MEDICINE
Payer: COMMERCIAL

## 2024-03-17 ENCOUNTER — APPOINTMENT (EMERGENCY)
Dept: RADIOLOGY | Facility: HOSPITAL | Age: 59
End: 2024-03-17
Payer: COMMERCIAL

## 2024-03-17 VITALS
WEIGHT: 146.39 LBS | RESPIRATION RATE: 18 BRPM | HEART RATE: 75 BPM | SYSTOLIC BLOOD PRESSURE: 113 MMHG | TEMPERATURE: 97.5 F | OXYGEN SATURATION: 99 % | DIASTOLIC BLOOD PRESSURE: 72 MMHG | BODY MASS INDEX: 24.36 KG/M2

## 2024-03-17 DIAGNOSIS — K59.00 CONSTIPATION: ICD-10-CM

## 2024-03-17 DIAGNOSIS — R55 VASOVAGAL NEAR SYNCOPE: Primary | ICD-10-CM

## 2024-03-17 LAB
ANION GAP SERPL CALCULATED.3IONS-SCNC: 5 MMOL/L (ref 4–13)
ATRIAL RATE: 56 BPM
BASOPHILS # BLD AUTO: 0.03 THOUSANDS/ÂΜL (ref 0–0.1)
BASOPHILS NFR BLD AUTO: 1 % (ref 0–1)
BUN SERPL-MCNC: 15 MG/DL (ref 5–25)
CALCIUM SERPL-MCNC: 9.2 MG/DL (ref 8.4–10.2)
CARDIAC TROPONIN I PNL SERPL HS: 4 NG/L
CHLORIDE SERPL-SCNC: 108 MMOL/L (ref 96–108)
CO2 SERPL-SCNC: 28 MMOL/L (ref 21–32)
CREAT SERPL-MCNC: 0.75 MG/DL (ref 0.6–1.3)
EOSINOPHIL # BLD AUTO: 0.21 THOUSAND/ÂΜL (ref 0–0.61)
EOSINOPHIL NFR BLD AUTO: 4 % (ref 0–6)
ERYTHROCYTE [DISTWIDTH] IN BLOOD BY AUTOMATED COUNT: 13.9 % (ref 11.6–15.1)
GFR SERPL CREATININE-BSD FRML MDRD: 87 ML/MIN/1.73SQ M
GLUCOSE SERPL-MCNC: 98 MG/DL (ref 65–140)
HCT VFR BLD AUTO: 39.5 % (ref 34.8–46.1)
HGB BLD-MCNC: 12.7 G/DL (ref 11.5–15.4)
IMM GRANULOCYTES # BLD AUTO: 0.02 THOUSAND/UL (ref 0–0.2)
IMM GRANULOCYTES NFR BLD AUTO: 0 % (ref 0–2)
LYMPHOCYTES # BLD AUTO: 2.04 THOUSANDS/ÂΜL (ref 0.6–4.47)
LYMPHOCYTES NFR BLD AUTO: 35 % (ref 14–44)
MCH RBC QN AUTO: 28.3 PG (ref 26.8–34.3)
MCHC RBC AUTO-ENTMCNC: 32.2 G/DL (ref 31.4–37.4)
MCV RBC AUTO: 88 FL (ref 82–98)
MONOCYTES # BLD AUTO: 0.57 THOUSAND/ÂΜL (ref 0.17–1.22)
MONOCYTES NFR BLD AUTO: 10 % (ref 4–12)
NEUTROPHILS # BLD AUTO: 3.05 THOUSANDS/ÂΜL (ref 1.85–7.62)
NEUTS SEG NFR BLD AUTO: 50 % (ref 43–75)
NRBC BLD AUTO-RTO: 0 /100 WBCS
P AXIS: 64 DEGREES
PLATELET # BLD AUTO: 181 THOUSANDS/UL (ref 149–390)
PMV BLD AUTO: 11.6 FL (ref 8.9–12.7)
POTASSIUM SERPL-SCNC: 3.6 MMOL/L (ref 3.5–5.3)
PR INTERVAL: 178 MS
QRS AXIS: 64 DEGREES
QRSD INTERVAL: 80 MS
QT INTERVAL: 440 MS
QTC INTERVAL: 424 MS
RBC # BLD AUTO: 4.49 MILLION/UL (ref 3.81–5.12)
SODIUM SERPL-SCNC: 141 MMOL/L (ref 135–147)
T WAVE AXIS: 74 DEGREES
VENTRICULAR RATE: 56 BPM
WBC # BLD AUTO: 5.92 THOUSAND/UL (ref 4.31–10.16)

## 2024-03-17 PROCEDURE — 36415 COLL VENOUS BLD VENIPUNCTURE: CPT

## 2024-03-17 PROCEDURE — 85025 COMPLETE CBC W/AUTO DIFF WBC: CPT

## 2024-03-17 PROCEDURE — 93010 ELECTROCARDIOGRAM REPORT: CPT | Performed by: INTERNAL MEDICINE

## 2024-03-17 PROCEDURE — 99285 EMERGENCY DEPT VISIT HI MDM: CPT | Performed by: EMERGENCY MEDICINE

## 2024-03-17 PROCEDURE — 80048 BASIC METABOLIC PNL TOTAL CA: CPT

## 2024-03-17 PROCEDURE — 84484 ASSAY OF TROPONIN QUANT: CPT

## 2024-03-17 PROCEDURE — 99284 EMERGENCY DEPT VISIT MOD MDM: CPT

## 2024-03-17 PROCEDURE — 93005 ELECTROCARDIOGRAM TRACING: CPT

## 2024-03-17 PROCEDURE — 71046 X-RAY EXAM CHEST 2 VIEWS: CPT

## 2024-03-17 RX ORDER — LEVOTHYROXINE SODIUM 0.1 MG/1
100 TABLET ORAL ONCE
Status: COMPLETED | OUTPATIENT
Start: 2024-03-17 | End: 2024-03-17

## 2024-03-17 RX ADMIN — LEVOTHYROXINE SODIUM 100 MCG: 100 TABLET ORAL at 06:25

## 2024-03-17 NOTE — ED ATTENDING ATTESTATION
"3/17/2024  I, Chloe Rowan, , saw and evaluated the patient. I have discussed the patient with the resident/non-physician practitioner and agree with the resident's/non-physician practitioner's findings, Plan of Care, and MDM as documented in the resident's/non-physician practitioner's note, except where noted. All available labs and Radiology studies were reviewed.  I was present for key portions of any procedure(s) performed by the resident/non-physician practitioner and I was immediately available to provide assistance.       At this point I agree with the current assessment done in the Emergency Department.  I have conducted an independent evaluation of this patient a history and physical is as follows:58y F biba from home for evaluation of reported near syncopal episode at home.  Took a laxative tea and when on the toilet had cramping / abd pain. Became LH, nauseated and felt faint.  EMS contacted.  Currently pt denies any symptoms other than feeling a little tired. Recently followed w/ cardiology after admission in February for AMS - on admission found to have a mild to moderate pericardial effusion w/ no evidence of tamponade - has a f/u the end of March for repeat echo - no acute treatment at this time. Exam WNWD nad, mmm, neck supple, resp non-labored, cv regular rate, abd nd, ext no cce, skin dry, neuro non-focal, normal mood. A/P Near syncope - likely vasovagal.  Will get EKG to r/o arrhythmia, ischemic changes.  Will get labs to r/o acute life threatening metabolic abnl, cardiac ischemia, significant anemia.  Will get CXR to r/o occult pna/effusion      ED Course     Labs Reviewed   HS TROPONIN I 0HR - Normal       Result Value Ref Range Status    hs TnI 0hr 4  \"Refer to ACS Flowchart\"- see link ng/L Final    Comment:                                              Initial (time 0) result  If >=50 ng/L, Myocardial injury suggested ;  Type of myocardial injury and treatment strategy  to be " determined.  If 5-49 ng/L, a delta result at 2 hours and or 4 hours will be needed to further evaluate.  If <4 ng/L, and chest pain has been >3 hours since onset, patient may qualify for discharge based on the HEART score in the ED.  If <5 ng/L and <3hours since onset of chest pain, a delta result at 2 hours will be needed to further evaluate.    HS Troponin 99th Percentile URL of a Health Population=12 ng/L with a 95% Confidence Interval of 8-18 ng/L.    Second Troponin (time 2 hours)  If calculated delta >= 20 ng/L,  Myocardial injury suggested ; Type of myocardial injury and treatment strategy to be determined.  If 5-49 ng/L and the calculated delta is 5-19 ng/L, consult medical service for evaluation.  Continue evaluation for ischemia on ecg and other possible etiology and repeat hs troponin at 4 hours.  If delta is <5 ng/L at 2 hours, consider discharge based on risk stratification via the HEART score (if in ED), or LACY risk score in IP/Observation.    HS Troponin 99th Percentile URL of a Health Population=12 ng/L with a 95% Confidence Interval of 8-18 ng/L.   CBC AND DIFFERENTIAL    WBC 5.92  4.31 - 10.16 Thousand/uL Final    RBC 4.49  3.81 - 5.12 Million/uL Final    Hemoglobin 12.7  11.5 - 15.4 g/dL Final    Hematocrit 39.5  34.8 - 46.1 % Final    MCV 88  82 - 98 fL Final    MCH 28.3  26.8 - 34.3 pg Final    MCHC 32.2  31.4 - 37.4 g/dL Final    RDW 13.9  11.6 - 15.1 % Final    MPV 11.6  8.9 - 12.7 fL Final    Platelets 181  149 - 390 Thousands/uL Final    nRBC 0  /100 WBCs Final    Neutrophils Relative 50  43 - 75 % Final    Immature Grans % 0  0 - 2 % Final    Lymphocytes Relative 35  14 - 44 % Final    Monocytes Relative 10  4 - 12 % Final    Eosinophils Relative 4  0 - 6 % Final    Basophils Relative 1  0 - 1 % Final    Neutrophils Absolute 3.05  1.85 - 7.62 Thousands/µL Final    Absolute Immature Grans 0.02  0.00 - 0.20 Thousand/uL Final    Absolute Lymphocytes 2.04  0.60 - 4.47 Thousands/µL Final     Absolute Monocytes 0.57  0.17 - 1.22 Thousand/µL Final    Eosinophils Absolute 0.21  0.00 - 0.61 Thousand/µL Final    Basophils Absolute 0.03  0.00 - 0.10 Thousands/µL Final   BASIC METABOLIC PANEL    Sodium 141  135 - 147 mmol/L Final    Potassium 3.6  3.5 - 5.3 mmol/L Final    Chloride 108  96 - 108 mmol/L Final    CO2 28  21 - 32 mmol/L Final    ANION GAP 5  4 - 13 mmol/L Final    BUN 15  5 - 25 mg/dL Final    Creatinine 0.75  0.60 - 1.30 mg/dL Final    Comment: Standardized to IDMS reference method    Glucose 98  65 - 140 mg/dL Final    Comment: If the patient is fasting, the ADA then defines impaired fasting glucose as > 100 mg/dL and diabetes as > or equal to 123 mg/dL.    Calcium 9.2  8.4 - 10.2 mg/dL Final    eGFR 87  ml/min/1.73sq m Final    Narrative:     National Kidney Disease Foundation guidelines for Chronic Kidney Disease (CKD):     Stage 1 with normal or high GFR (GFR > 90 mL/min/1.73 square meters)    Stage 2 Mild CKD (GFR = 60-89 mL/min/1.73 square meters)    Stage 3A Moderate CKD (GFR = 45-59 mL/min/1.73 square meters)    Stage 3B Moderate CKD (GFR = 30-44 mL/min/1.73 square meters)    Stage 4 Severe CKD (GFR = 15-29 mL/min/1.73 square meters)    Stage 5 End Stage CKD (GFR <15 mL/min/1.73 square meters)  Note: GFR calculation is accurate only with a steady state creatinine     XR chest 2 views   Final Result      No acute cardiopulmonary disease.            Workstation performed: [id number]               Critical Care Time  Procedures  1. Vasovagal near syncope        2. Constipation          Time reflects when diagnosis was documented in both MDM as applicable and the Disposition within this note       Time User Action Codes Description Comment    3/17/2024  6:35 AM Trini Jenkins [R55] Vasovagal near syncope     3/17/2024  6:35 AM Trini Jenkins [K59.00] Constipation           ED Disposition       ED Disposition   Discharge    Condition   Stable    Date/Time   Sun Mar 17, 2024   7:06 AM    Comment   Wilma Rowe discharge to home/self care.                   Follow-up Information       Follow up With Specialties Details Why Contact Info Additional Information    Charissa Carpenter MD Family Medicine Schedule an appointment as soon as possible for a visit   [address] [place] [address] [phone]       [facility] Emergency Department Emergency Medicine Go to  If symptoms worsen [address] [place] [id number] [phone] [facility] Emergency Department, [address] [place] [address], [phone]

## 2024-03-17 NOTE — DISCHARGE INSTRUCTIONS
Please follow up with your primary care provider for reevaluation regarding your symptoms, please follow up with GI regarding your constipation.     Please return to the Emergency Department if you experience worsening of your current symptoms, chest pain, shortness of breath, loss of consciousness, inability to have a bowel movement or pass gas, or any new/other concerning symptoms.

## 2024-03-17 NOTE — ED PROVIDER NOTES
History  Chief Complaint   Patient presents with    Medical Problem     Patient arrived by EMS. Patient states she took a tea to help with constipation. Patient was on the toilet when she had cramping, dizziness, diaphoretic, and felt as if she was going to pass out, but didn't. Patient has no complaints right now besides being tired.      [facility]  ED Course as of 03/17/24 0707   Sun Mar 17, 2024   0625 HPI:   Patient is a 58 y.o. female with PMHx pericardial effusion, hypothyroidism, who presents to the ED via EMS for evaluation of abdominal cramping, diaphoresis, lightheadedness, and dyspnea while straining to have a bowel movement just prior to arrival. Patient reports the episode lasted about five minutes and improved after a cool washcloth to her head and drinking water. Patient reports a small BM yesterday. She only ate two eggs for breakfast in the morning due to being occupied with cleaning.  She denies any fevers, chills, changes in vision or hearing, neck pain, chest pain, vomiting, diarrhea, lower extremity swelling, head injury, recurrence of symptoms, current shortness of breath, current abdominal pain, or any other complaints or concerns at this time.   0628 ROS:   All other systems reviewed and negative unless otherwise stated in HPI above.    PHYSICAL EXAM:  General: NAD, awake, alert. Speaking normally in full sentences.  Head: Normocephalic, atraumatic.  Eyes: EOM-I. No diplopia. PERRL.  ENT: Atraumatic external nose and ears. No stridor. Normal phonation.   Neck: Symmetric, trachea midline. No JVD.   CV: RRR. No murmurs or gallops.   Peripheral pulses +2 throughout. No chest wall tenderness.   Lungs: Unlabored. No retractions. No tachypnea. CTA, lungs sounds equal bilateral.   Abd: Flat, nondistended. +BS, soft, nontender.   MSK: FROM, no deformity/injury.  Skin: Warm, dry, intact.   Neuro: AAOx3, CN II-XII grossly intact. Motor grossly intact.  Psychiatric/Behavioral: Appropriate mood and affect.     0630 ASSESSMENT: Patient is a 58 y.o. female who presents with diaphoresis, dyspnea, lightheadedness, abdominal cramping while attempted to have a bowel movement. Resolved without recurrence.  DDX includes but not limited to: vasovagal presyncope, constipation, dehydration, arrhythmia.   PLAN: CBC, BMP, trop, EKG, CXR. Treated with levothyroxine.   0634 WBC: 5.92   0634 Hemoglobin: 12.7   0634 Platelet Count: 181   0655 hs TnI 0hr: 4  Doubt ACS, no arrhythmia while monitored on telemetry   0656 Sodium: 141   0656 Potassium: 3.6   0656 Chloride: 108   0656 Creatinine: 0.75   0656 GLUCOSE: 98   0656 ECG 12 lead  EKG performed at 0556 as read by me: Sinus bradycardia 56 bpm, normal axis, normal intervals, no acute ST elevations or depressions, similar compared to prior on 2024   0703 XR chest 2 views  No acute cardiopulmonary process   0706 Discussed results and plan with patient. Advised on need for outpatient follow up. Given return precautions verbally and in discharge instructions, confirmed with teach back method. All questions answered prior to discharge. Patient expressed verbal understanding and is agreeable with plan for discharge with outpatient follow up.         Prior to Admission Medications   Prescriptions Last Dose Informant Patient Reported? Taking?   Magnesium 500 MG CAPS Past Week Self Yes Yes   Sig: Take by mouth daily at bedtime   Multiple Vitamins-Minerals (ALIVE MULTI-VITAMIN PO) 3/16/2024 Self Yes Yes   Sig: Take by mouth daily at bedtime   TURMERIC PO 3/16/2024 Self Yes Yes   Sig: Take by mouth daily at bedtime   Vitamin D, Cholecalciferol, 10 MCG (400 UNIT) TABS 3/16/2024 Self Yes Yes   Sig: Take by mouth   aspirin (ECOTRIN LOW STRENGTH) 81 mg EC tablet 3/16/2024 Self Yes Yes   Sig: Take 81 mg by mouth daily   fluticasone (FLONASE) 50 mcg/act nasal spray Not Taking Self No No   Si spray into each nostril daily   Patient not taking: Reported on 2024   levothyroxine 100 mcg  tablet 3/16/2024 Self Yes Yes   Sig: Take 100 mcg by mouth daily   naproxen (NAPROSYN) 500 mg tablet Not Taking Self No No   Sig: Take 1 tablet (500 mg total) by mouth 2 (two) times a day with meals   Patient not taking: Reported on 2/27/2024   nicotine (NICODERM CQ) 14 mg/24hr TD 24 hr patch Past Week Self Yes Yes   Sig: Place 1 patch on the skin every 24 hours      Facility-Administered Medications: None       Past Medical History:   Diagnosis Date    Colon polyp     Disease of thyroid gland     Urinary incontinence        Past Surgical History:   Procedure Laterality Date    COLONOSCOPY      LASER ABLATION OF THE CERVIX  2008    KS ANTERIOR COLPORRAPHY RPR CYSTOCELE W/CYSTO N/A 7/14/2021    Procedure: Cystocele Repair, Sacrospinous Ligament fixation;  Surgeon: Carlos Sharp MD;  Location: [facility];  Service: Gynecology    KS CYSTOURETHROSCOPY N/A 7/14/2021    Procedure: CYSTOSCOPY;  Surgeon: Carlos Sharp MD;  Location: [facility];  Service: Gynecology    KS SLING OPERATION STRESS INCONTINENCE N/A 7/14/2021    Procedure: INSERTION PUBOVAGINAL SLING (FEMALE);  Surgeon: Carlos Sharp MD;  Location: [facility];  Service: Gynecology    THYROIDECTOMY         History reviewed. No pertinent family history.  I have reviewed and agree with the history as documented.    E-Cigarette/Vaping    E-Cigarette Use Never User      E-Cigarette/Vaping Substances    Nicotine No     THC No     CBD No     Flavoring No     Other No     Unknown No      Social History     Tobacco Use    Smoking status: Former     Types: Cigarettes    Smokeless tobacco: Never    Tobacco comments:     1/4 pack a day.   Vaping Use    Vaping status: Never Used   Substance Use Topics    Alcohol use: Yes     Comment: socially    Drug use: Not Currently        Review of Systems    Physical Exam  ED Triage Vitals   Temperature Pulse Respirations Blood Pressure SpO2   03/17/24 0549 03/17/24 0548 03/17/24 0548 03/17/24 0548 03/17/24 0548   97.5 °F (36.4 °C) 78 18  118/76 99 %      Temp Source Heart Rate Source Patient Position - Orthostatic VS BP Location FiO2 (%)   03/17/24 0549 -- 03/17/24 0548 03/17/24 0548 --   Oral  Lying Right arm       Pain Score       03/17/24 0548       No Pain             Orthostatic Vital Signs  Vitals:    03/17/24 0548   BP: 118/76   Pulse: 78   Patient Position - Orthostatic VS: Lying       Physical Exam    ED Medications  Medications   levothyroxine tablet 100 mcg (100 mcg Oral Given 3/17/24 0625)       Diagnostic Studies  Results Reviewed       Procedure Component Value Units Date/Time    HS Troponin 0hr (reflex protocol) [[id number]]  (Normal) Collected: 03/17/24 0620    Lab Status: Final result Specimen: Blood from Arm, Left Updated: 03/17/24 0652     hs TnI 0hr 4 ng/L     Basic metabolic panel [[id number]] Collected: 03/17/24 0620    Lab Status: Final result Specimen: Blood from Arm, Left Updated: 03/17/24 0645     Sodium 141 mmol/L      Potassium 3.6 mmol/L      Chloride 108 mmol/L      CO2 28 mmol/L      ANION GAP 5 mmol/L      BUN 15 mg/dL      Creatinine 0.75 mg/dL      Glucose 98 mg/dL      Calcium 9.2 mg/dL      eGFR 87 ml/min/1.73sq m     Narrative:      National Kidney Disease Foundation guidelines for Chronic Kidney Disease (CKD):     Stage 1 with normal or high GFR (GFR > 90 mL/min/1.73 square meters)    Stage 2 Mild CKD (GFR = 60-89 mL/min/1.73 square meters)    Stage 3A Moderate CKD (GFR = 45-59 mL/min/1.73 square meters)    Stage 3B Moderate CKD (GFR = 30-44 mL/min/1.73 square meters)    Stage 4 Severe CKD (GFR = 15-29 mL/min/1.73 square meters)    Stage 5 End Stage CKD (GFR <15 mL/min/1.73 square meters)  Note: GFR calculation is accurate only with a steady state creatinine    CBC and differential [[id number]] Collected: 03/17/24 0620    Lab Status: Final result Specimen: Blood from Arm, Left Updated: 03/17/24 0628     WBC 5.92 Thousand/uL      RBC 4.49 Million/uL      Hemoglobin 12.7 g/dL      Hematocrit 39.5 %      MCV 88 fL       MCH 28.3 pg      MCHC 32.2 g/dL      RDW 13.9 %      MPV 11.6 fL      Platelets 181 Thousands/uL      nRBC 0 /100 WBCs      Neutrophils Relative 50 %      Immature Grans % 0 %      Lymphocytes Relative 35 %      Monocytes Relative 10 %      Eosinophils Relative 4 %      Basophils Relative 1 %      Neutrophils Absolute 3.05 Thousands/µL      Absolute Immature Grans 0.02 Thousand/uL      Absolute Lymphocytes 2.04 Thousands/µL      Absolute Monocytes 0.57 Thousand/µL      Eosinophils Absolute 0.21 Thousand/µL      Basophils Absolute 0.03 Thousands/µL                    XR chest 2 views    (Results Pending)         Procedures  Procedures      ED Course  ED Course as of 03/17/24 0707   Sun Mar 17, 2024   0625 HPI:   Patient is a 58 y.o. female with PMHx pericardial effusion, hypothyroidism, who presents to the ED via EMS for evaluation of abdominal cramping, diaphoresis, lightheadedness, and dyspnea while straining to have a bowel movement just prior to arrival. Patient reports the episode lasted about five minutes and improved after a cool washcloth to her head and drinking water. Patient reports a small BM yesterday. She only ate two eggs for breakfast in the morning due to being occupied with cleaning.  She denies any fevers, chills, changes in vision or hearing, neck pain, chest pain, vomiting, diarrhea, lower extremity swelling, head injury, recurrence of symptoms, current shortness of breath, current abdominal pain, or any other complaints or concerns at this time.   0628 ROS:   All other systems reviewed and negative unless otherwise stated in HPI above.    PHYSICAL EXAM:  General: NAD, awake, alert. Speaking normally in full sentences.  Head: Normocephalic, atraumatic.  Eyes: EOM-I. No diplopia. PERRL.  ENT: Atraumatic external nose and ears. No stridor. Normal phonation.   Neck: Symmetric, trachea midline. No JVD.   CV: RRR. No murmurs or gallops.   Peripheral pulses +2 throughout. No chest wall tenderness.    Lungs: Unlabored. No retractions. No tachypnea. CTA, lungs sounds equal bilateral.   Abd: Flat, nondistended. +BS, soft, nontender.   MSK: FROM, no deformity/injury.  Skin: Warm, dry, intact.   Neuro: AAOx3, CN II-XII grossly intact. Motor grossly intact.  Psychiatric/Behavioral: Appropriate mood and affect.    0630 ASSESSMENT: Patient is a 58 y.o. female who presents with diaphoresis, dyspnea, lightheadedness, abdominal cramping while attempted to have a bowel movement. Resolved without recurrence.  DDX includes but not limited to: vasovagal presyncope, constipation, dehydration, arrhythmia.   PLAN: CBC, BMP, trop, EKG, CXR. Treated with levothyroxine.   0634 WBC: 5.92   0634 Hemoglobin: 12.7   0634 Platelet Count: 181   0655 hs TnI 0hr: 4  Doubt ACS, no arrhythmia while monitored on telemetry   0656 Sodium: 141   0656 Potassium: 3.6   0656 Chloride: 108   0656 Creatinine: 0.75   0656 GLUCOSE: 98   0656 ECG 12 lead  EKG performed at 0556 as read by me: Sinus bradycardia 56 bpm, normal axis, normal intervals, no acute ST elevations or depressions, similar compared to prior on 13 February 2024   0703 XR chest 2 views  No acute cardiopulmonary process   0706 Discussed results and plan with patient. Advised on need for outpatient follow up. Given return precautions verbally and in discharge instructions, confirmed with teach back method. All questions answered prior to discharge. Patient expressed verbal understanding and is agreeable with plan for discharge with outpatient follow up.                             SBIRT 20yo+      Flowsheet Row Most Recent Value   Initial Alcohol Screen: US AUDIT-C     1. How often do you have a drink containing alcohol? 0 Filed at: 03/17/2024 0550   2. How many drinks containing alcohol do you have on a typical day you are drinking?  0 Filed at: 03/17/2024 0550   3a. Male UNDER 65: How often do you have five or more drinks on one occasion? 0 Filed at: 03/17/2024 0550   3b. FEMALE Any  Age, or MALE 65+: How often do you have 4 or more drinks on one occassion? 0 Filed at: 03/17/2024 0550   Audit-C Score 0 Filed at: 03/17/2024 0550   RIANNA: How many times in the past year have you...    Used an illegal drug or used a prescription medication for non-medical reasons? Never Filed at: 03/17/2024 0550                  Medical Decision Making  See ED course for additional details.      Problems Addressed:  Constipation: chronic illness or injury  Vasovagal near syncope: acute illness or injury    Amount and/or Complexity of Data Reviewed  External Data Reviewed: notes.     Details: 2/27 cardiology visit for pericardial effusion, 2/13 hospitalization diagnoses transient global ischemia  Labs: ordered. Decision-making details documented in ED Course.  Radiology: ordered. Decision-making details documented in ED Course.  ECG/medicine tests:  Decision-making details documented in ED Course.    Risk  OTC drugs.  Prescription drug management.          Disposition  Final diagnoses:   Vasovagal near syncope   Constipation     Time reflects when diagnosis was documented in both MDM as applicable and the Disposition within this note       Time User Action Codes Description Comment    3/17/2024  6:35 AM Trini Jenkins Add [R55] Vasovagal near syncope     3/17/2024  6:35 AM Trini Jenkins Add [K59.00] Constipation           ED Disposition       ED Disposition   Discharge    Condition   Stable    Date/Time   Sun Mar 17, 2024 0706    Comment   Wilma Rowe discharge to home/self care.                   Follow-up Information       Follow up With Specialties Details Why Contact Info Additional Information    Charissa Carpenter MD Family Medicine Schedule an appointment as soon as possible for a visit   [address] [place] [address] [phone]       [facility] Emergency Department Emergency Medicine Go to  If symptoms worsen [address] [facility] [address] [phone]  [facility] Emergency Department, [address] [place], [place], [address]            Patient's Medications   Discharge Prescriptions    No medications on file     No discharge procedures on file.    PDMP Review         Value Time User    PDMP Reviewed  Yes 7/14/2021  2:10 PM Carlos Sharp MD             ED Provider  Attending physically available and evaluated Wilma Rowe. I managed the patient along with the ED Attending.    Electronically Signed by           Trini Crow DO  03/17/24 0707

## 2024-04-01 ENCOUNTER — HOSPITAL ENCOUNTER (OUTPATIENT)
Dept: NON INVASIVE DIAGNOSTICS | Facility: HOSPITAL | Age: 59
Discharge: HOME | End: 2024-04-01
Payer: COMMERCIAL

## 2024-04-01 VITALS
HEIGHT: 65 IN | DIASTOLIC BLOOD PRESSURE: 72 MMHG | BODY MASS INDEX: 24.32 KG/M2 | SYSTOLIC BLOOD PRESSURE: 113 MMHG | WEIGHT: 146 LBS | HEART RATE: 75 BPM

## 2024-04-01 DIAGNOSIS — I31.39 PERICARDIAL EFFUSION: ICD-10-CM

## 2024-04-01 LAB
AORTIC VALVE MEAN VELOCITY: 9.1 M/S
AV LVOT MEAN GRADIENT: 3 MMHG
AV LVOT PEAK GRADIENT: 8 MMHG
AV MEAN GRADIENT: 4 MMHG
AV PEAK GRADIENT: 9 MMHG
AV VELOCITY RATIO: 0.99
BSA FOR ECHO PROCEDURE: 1.73 M2
DOP CALC AO PEAK VEL: 1.46 M/S
DOP CALC AO VTI: 28.04 CM
DOP CALC LVOT PEAK VEL VTI: 27.77 CM
DOP CALC LVOT PEAK VEL: 1.45 M/S
E WAVE DECELERATION TIME: 276 MS
E/A RATIO: 1
MV E'TISSUE VEL-SEP: 7 CM/S
MV PEAK A VEL: 0.76 M/S
MV PEAK E VEL: 76 CM/S
MV STENOSIS PRESSURE HALF TIME: 80 MS
MV VALVE AREA P 1/2 METHOD: 2.75
TR MAX PG: 20 MMHG
TR PEAK VELOCITY: 2.2 M/S
TRICUSPID VALVE PEAK REGURGITATION VELOCITY: 2.21 M/S

## 2024-04-01 PROCEDURE — 93308 TTE F-UP OR LMTD: CPT | Performed by: INTERNAL MEDICINE

## 2024-04-01 PROCEDURE — 93321 DOPPLER ECHO F-UP/LMTD STD: CPT | Performed by: INTERNAL MEDICINE

## 2024-04-01 PROCEDURE — 93308 TTE F-UP OR LMTD: CPT

## 2024-04-01 PROCEDURE — 93325 DOPPLER ECHO COLOR FLOW MAPG: CPT | Performed by: INTERNAL MEDICINE

## 2024-04-01 PROCEDURE — 93321 DOPPLER ECHO F-UP/LMTD STD: CPT

## 2024-04-01 PROCEDURE — 93325 DOPPLER ECHO COLOR FLOW MAPG: CPT

## 2024-04-03 ENCOUNTER — TELEPHONE (OUTPATIENT)
Dept: CARDIOLOGY CLINIC | Facility: CLINIC | Age: 59
End: 2024-04-03

## 2024-04-03 NOTE — TELEPHONE ENCOUNTER
----- Message from SINDY Nixon sent at 4/2/2024  5:46 PM EDT -----  Please call Wilma and inform her the 2 D echocardiogram showed small pericardial effusion.  Thank you     Called pt and lft msg re: Echo rslts, small pericardial effusion.

## 2024-04-25 ENCOUNTER — TELEPHONE (OUTPATIENT)
Dept: NEUROLOGY | Facility: CLINIC | Age: 59
End: 2024-04-25

## 2024-04-25 NOTE — TELEPHONE ENCOUNTER
1ST ATTEMPT,     Called pt no answer, LMOM.    KitengaT MESSAGE SENT     Thank you,     Jossy           HFU/ DONTE ALL / STROKE LIKE SYMPTOMS    DC- HOME- 2/14/2024      Wilma Rowe will need follow up in in 6 weeks with general attending. She will not require outpatient neurological testing.

## 2024-07-26 ENCOUNTER — ANNUAL EXAM (OUTPATIENT)
Dept: GYNECOLOGY | Facility: CLINIC | Age: 59
End: 2024-07-26
Payer: COMMERCIAL

## 2024-07-26 VITALS
BODY MASS INDEX: 25.87 KG/M2 | SYSTOLIC BLOOD PRESSURE: 116 MMHG | HEIGHT: 63 IN | DIASTOLIC BLOOD PRESSURE: 78 MMHG | WEIGHT: 146 LBS

## 2024-07-26 DIAGNOSIS — N95.2 VAGINAL ATROPHY: ICD-10-CM

## 2024-07-26 DIAGNOSIS — R31.29 OTHER MICROSCOPIC HEMATURIA: ICD-10-CM

## 2024-07-26 DIAGNOSIS — Z01.419 WOMEN'S ANNUAL ROUTINE GYNECOLOGICAL EXAMINATION: Primary | ICD-10-CM

## 2024-07-26 DIAGNOSIS — R92.333 HETEROGENEOUSLY DENSE TISSUE OF BOTH BREASTS ON MAMMOGRAPHY: ICD-10-CM

## 2024-07-26 DIAGNOSIS — N39.41 URGE INCONTINENCE: ICD-10-CM

## 2024-07-26 DIAGNOSIS — Z12.31 ENCOUNTER FOR SCREENING MAMMOGRAM FOR BREAST CANCER: ICD-10-CM

## 2024-07-26 PROCEDURE — S0612 ANNUAL GYNECOLOGICAL EXAMINA: HCPCS | Performed by: OBSTETRICS & GYNECOLOGY

## 2024-07-26 PROCEDURE — G0145 SCR C/V CYTO,THINLAYER,RESCR: HCPCS | Performed by: OBSTETRICS & GYNECOLOGY

## 2024-07-26 RX ORDER — LEVOTHYROXINE SODIUM 88 UG/1
88 TABLET ORAL
COMMUNITY
Start: 2024-07-09

## 2024-07-26 NOTE — PROGRESS NOTES
Assessment & Plan   Diagnoses and all orders for this visit:    Women's annual routine gynecological examination    Encounter for screening mammogram for breast cancer  -     Mammo screening bilateral w 3d & cad; Future    Vaginal atrophy    Urge incontinence  -     Ambulatory Referral to Urology; Future    Other microscopic hematuria  -     Ambulatory Referral to Urology; Future    Other orders  -     levothyroxine 88 mcg tablet; 88 mcg    1. yearly exam-Pap smear done with reflex HPV at patient request, self breast awareness reviewed, calcium/vitamin D recommendations discussed, mammogram request given, colonoscopy recommended  2. urinary urgency with incontinence/long-term hematuria-has had blood in the urine for many years.  States she has been told to see urology for greater than 20 years.  Most recent urine dip was with large blood, 4-10 RBCs on microscopy.  She does have significant urinary urgency with leaking, denies significant stress symptoms.  Exam was unremarkable for any cystocele.  Bladder was nontender.  Recommend referral to urology, referral placed to Dr. Goodman.  Will proceed accordingly.  At last visit, had discussed Myrbetriq and prescription was sent.  Patient has no recollection of that and no medication was taken.  Did place notification of Myrbetriq and Gemtesa in the patient education section and she will review this.  Would suggest she see urologist first and then proceed accordingly.  3.  Vaginal atrophy-mild changes noted on exam.  Vaginal lubrication/moisturizer sheet given, to use accordingly.  4.  Dense breast-noted on most recent mammogram 7/7/2023.  3D mammogram request given.  Dense breast changes can be associated with limited visualization and potential increased risk, could consider ABUS going forward.  5.  History of abnormal Pap-status post laser cervix in 2008.  Pap with HPV was negative from 7/21/2023.  Pap was requested, done today with reflex HPV.  Follow-up 1 year for  yearly exam or as needed.    Subjective   Patient ID: Wilma Rowe is a 59 y.o. female.    Vitals:    07/26/24 0927   BP: 116/78     HPI    The following portions of the patient's history were reviewed and updated as appropriate: allergies, current medications, past family history, past medical history, past social history, past surgical history, and problem list.  Past Medical History:   Diagnosis Date    Colon polyp     Disease of thyroid gland     Urinary incontinence      Past Surgical History:   Procedure Laterality Date    COLONOSCOPY      LASER ABLATION OF THE CERVIX  2008    MI ANTERIOR COLPORRAPHY RPR CYSTOCELE W/CYSTO N/A 7/14/2021    Procedure: Cystocele Repair, Sacrospinous Ligament fixation;  Surgeon: Carlos Sharp MD;  Location: [facility];  Service: Gynecology    MI CYSTOURETHROSCOPY N/A 7/14/2021    Procedure: CYSTOSCOPY;  Surgeon: Carlos Sharp MD;  Location[facility];  Service: Gynecology    MI SLING OPERATION STRESS INCONTINENCE N/A 7/14/2021    Procedure: INSERTION PUBOVAGINAL SLING (FEMALE);  Surgeon: Carlos Sharp MD;  Location: [facility];  Service: Gynecology    THYROIDECTOMY       OB History   No obstetric history on file.       Current Outpatient Medications:     levothyroxine 100 mcg tablet, Take 100 mcg by mouth daily, Disp: , Rfl:     levothyroxine 88 mcg tablet, 88 mcg, Disp: , Rfl:     Magnesium 500 MG CAPS, Take by mouth daily at bedtime, Disp: , Rfl:     Multiple Vitamins-Minerals (ALIVE MULTI-VITAMIN PO), Take by mouth daily at bedtime, Disp: , Rfl:     TURMERIC PO, Take by mouth daily at bedtime, Disp: , Rfl:     Vitamin D, Cholecalciferol, 10 MCG (400 UNIT) TABS, Take by mouth, Disp: , Rfl:     aspirin (ECOTRIN LOW STRENGTH) 81 mg EC tablet, Take 81 mg by mouth daily, Disp: , Rfl:     fluticasone (FLONASE) 50 mcg/act nasal spray, 1 spray into each nostril daily (Patient not taking: Reported on 2/27/2024), Disp: 11.1 mL, Rfl: 0    naproxen (NAPROSYN) 500 mg tablet, Take 1  tablet (500 mg total) by mouth 2 (two) times a day with meals (Patient not taking: Reported on 2/27/2024), Disp: 30 tablet, Rfl: 0    nicotine (NICODERM CQ) 14 mg/24hr TD 24 hr patch, Place 1 patch on the skin every 24 hours, Disp: , Rfl:   Allergies   Allergen Reactions    Morphine Palpitations and Other (See Comments)     And itching     Social History     Socioeconomic History    Marital status: /Civil Union     Spouse name: None    Number of children: None    Years of education: None    Highest education level: None   Occupational History    None   Tobacco Use    Smoking status: Former     Types: Cigarettes    Smokeless tobacco: Never    Tobacco comments:     1/4 pack a day.   Vaping Use    Vaping status: Never Used   Substance and Sexual Activity    Alcohol use: Yes     Comment: socially    Drug use: Not Currently    Sexual activity: Not Currently     Birth control/protection: Surgical     Comment: Ablation   Other Topics Concern    None   Social History Narrative    None     Social Determinants of Health     Financial Resource Strain: Not on file   Food Insecurity: Food Insecurity Present (2/13/2024)    Hunger Vital Sign     Worried About Running Out of Food in the Last Year: Sometimes true     Ran Out of Food in the Last Year: Sometimes true   Transportation Needs: No Transportation Needs (2/13/2024)    PRAPARE - Transportation     Lack of Transportation (Medical): No     Lack of Transportation (Non-Medical): No   Physical Activity: Not on file   Stress: Not on file   Social Connections: Not on file   Intimate Partner Violence: Not on file   Housing Stability: Low Risk  (2/13/2024)    Housing Stability Vital Sign     Unable to Pay for Housing in the Last Year: No     Number of Times Moved in the Last Year: 1     Homeless in the Last Year: No     History reviewed. No pertinent family history.    Review of Systems    Objective   Physical Exam  OBGyn Exam     Objective      /78 (BP Location: Right  "arm, Patient Position: Sitting)   Ht 5' 3\" (1.6 m)   Wt 66.2 kg (146 lb)   BMI 25.86 kg/m²     General:   alert and oriented, in no acute distress   Neck: normal to inspection and palpation   Breast: normal appearance, no masses or tenderness   Heart:    Lungs:    Abdomen: soft, non-tender, without masses or organomegaly   Vulva: normal   Vagina: Mildly atrophic, without erythema or lesions or discharge   Cervix: Mildly atrophic, without lesions or discharge or cervicitis.  No CMT   Uterus: top normal size, anteverted, non-tender   Adnexa: no mass, fullness, tenderness   Rectum: negative    Psych:  Normal mood and affect   Skin:  Without obvious lesions   Eyes: symmetric, with normal movements and reactivity   Musculoskeletal:  Normal muscle tone and movements appreciated       "

## 2024-08-02 LAB
LAB AP GYN PRIMARY INTERPRETATION: NORMAL
Lab: NORMAL

## 2024-08-22 NOTE — ED NOTES
Patient transported to X-ray     Beena Donald RN  03/17/24 0640    
muscle weakness/difficulty walking

## 2024-10-14 ENCOUNTER — HOSPITAL ENCOUNTER (OUTPATIENT)
Dept: MAMMOGRAPHY | Facility: MEDICAL CENTER | Age: 59
Discharge: HOME | End: 2024-10-14
Payer: COMMERCIAL

## 2024-10-14 VITALS — WEIGHT: 145 LBS | HEIGHT: 63 IN | BODY MASS INDEX: 25.69 KG/M2

## 2024-10-14 DIAGNOSIS — Z12.31 ENCOUNTER FOR SCREENING MAMMOGRAM FOR BREAST CANCER: ICD-10-CM

## 2024-10-14 PROCEDURE — 77063 BREAST TOMOSYNTHESIS BI: CPT

## 2024-10-14 PROCEDURE — 77067 SCR MAMMO BI INCL CAD: CPT

## 2024-10-17 DIAGNOSIS — R92.2 INCONCLUSIVE MAMMOGRAM DUE TO DENSE BREASTS: ICD-10-CM

## 2024-10-17 DIAGNOSIS — Z12.39 SCREENING FOR BREAST CANCER USING NON-MAMMOGRAM MODALITY: Primary | ICD-10-CM

## 2024-10-17 DIAGNOSIS — R92.30 INCONCLUSIVE MAMMOGRAM DUE TO DENSE BREASTS: ICD-10-CM

## 2024-10-25 ENCOUNTER — APPOINTMENT (EMERGENCY)
Dept: CT IMAGING | Facility: HOSPITAL | Age: 59
End: 2024-10-25
Payer: COMMERCIAL

## 2024-10-25 ENCOUNTER — HOSPITAL ENCOUNTER (EMERGENCY)
Facility: HOSPITAL | Age: 59
Discharge: HOME | End: 2024-10-25
Attending: EMERGENCY MEDICINE
Payer: COMMERCIAL

## 2024-10-25 VITALS
DIASTOLIC BLOOD PRESSURE: 79 MMHG | WEIGHT: 151.24 LBS | OXYGEN SATURATION: 99 % | HEART RATE: 61 BPM | RESPIRATION RATE: 16 BRPM | TEMPERATURE: 98.7 F | BODY MASS INDEX: 26.79 KG/M2 | SYSTOLIC BLOOD PRESSURE: 139 MMHG

## 2024-10-25 DIAGNOSIS — R51.9 HEADACHE: Primary | ICD-10-CM

## 2024-10-25 DIAGNOSIS — R42 LIGHTHEADED: ICD-10-CM

## 2024-10-25 LAB
ALBUMIN SERPL BCG-MCNC: 4.3 G/DL (ref 3.5–5)
ALP SERPL-CCNC: 56 U/L (ref 34–104)
ALT SERPL W P-5'-P-CCNC: 10 U/L (ref 7–52)
ANION GAP SERPL CALCULATED.3IONS-SCNC: 5 MMOL/L (ref 4–13)
APTT PPP: 29 SECONDS (ref 23–34)
AST SERPL W P-5'-P-CCNC: 28 U/L (ref 13–39)
ATRIAL RATE: 58 BPM
BASOPHILS # BLD AUTO: 0.03 THOUSANDS/ΜL (ref 0–0.1)
BASOPHILS NFR BLD AUTO: 1 % (ref 0–1)
BILIRUB SERPL-MCNC: 0.35 MG/DL (ref 0.2–1)
BUN SERPL-MCNC: 14 MG/DL (ref 5–25)
CALCIUM SERPL-MCNC: 10.1 MG/DL (ref 8.4–10.2)
CARDIAC TROPONIN I PNL SERPL HS: 3 NG/L
CHLORIDE SERPL-SCNC: 103 MMOL/L (ref 96–108)
CO2 SERPL-SCNC: 30 MMOL/L (ref 21–32)
CREAT SERPL-MCNC: 0.71 MG/DL (ref 0.6–1.3)
EOSINOPHIL # BLD AUTO: 0.07 THOUSAND/ΜL (ref 0–0.61)
EOSINOPHIL NFR BLD AUTO: 1 % (ref 0–6)
ERYTHROCYTE [DISTWIDTH] IN BLOOD BY AUTOMATED COUNT: 14.4 % (ref 11.6–15.1)
GFR SERPL CREATININE-BSD FRML MDRD: 92 ML/MIN/1.73SQ M
GLUCOSE SERPL-MCNC: 109 MG/DL (ref 65–140)
HCT VFR BLD AUTO: 39.7 % (ref 34.8–46.1)
HGB BLD-MCNC: 13 G/DL (ref 11.5–15.4)
IMM GRANULOCYTES # BLD AUTO: 0.01 THOUSAND/UL (ref 0–0.2)
IMM GRANULOCYTES NFR BLD AUTO: 0 % (ref 0–2)
INR PPP: 0.91 (ref 0.85–1.19)
LYMPHOCYTES # BLD AUTO: 1.3 THOUSANDS/ΜL (ref 0.6–4.47)
LYMPHOCYTES NFR BLD AUTO: 25 % (ref 14–44)
MCH RBC QN AUTO: 28 PG (ref 26.8–34.3)
MCHC RBC AUTO-ENTMCNC: 32.7 G/DL (ref 31.4–37.4)
MCV RBC AUTO: 86 FL (ref 82–98)
MONOCYTES # BLD AUTO: 0.28 THOUSAND/ΜL (ref 0.17–1.22)
MONOCYTES NFR BLD AUTO: 5 % (ref 4–12)
NEUTROPHILS # BLD AUTO: 3.48 THOUSANDS/ΜL (ref 1.85–7.62)
NEUTS SEG NFR BLD AUTO: 68 % (ref 43–75)
NRBC BLD AUTO-RTO: 0 /100 WBCS
P AXIS: 60 DEGREES
PLATELET # BLD AUTO: 193 THOUSANDS/UL (ref 149–390)
PMV BLD AUTO: 11.1 FL (ref 8.9–12.7)
POTASSIUM SERPL-SCNC: 4 MMOL/L (ref 3.5–5.3)
PR INTERVAL: 174 MS
PROT SERPL-MCNC: 7 G/DL (ref 6.4–8.4)
PROTHROMBIN TIME: 12.5 SECONDS (ref 12.3–15)
QRS AXIS: -15 DEGREES
QRSD INTERVAL: 74 MS
QT INTERVAL: 416 MS
QTC INTERVAL: 408 MS
RBC # BLD AUTO: 4.64 MILLION/UL (ref 3.81–5.12)
SODIUM SERPL-SCNC: 138 MMOL/L (ref 135–147)
T WAVE AXIS: 40 DEGREES
VENTRICULAR RATE: 58 BPM
WBC # BLD AUTO: 5.17 THOUSAND/UL (ref 4.31–10.16)

## 2024-10-25 PROCEDURE — 70496 CT ANGIOGRAPHY HEAD: CPT

## 2024-10-25 PROCEDURE — 99284 EMERGENCY DEPT VISIT MOD MDM: CPT

## 2024-10-25 PROCEDURE — 93005 ELECTROCARDIOGRAM TRACING: CPT

## 2024-10-25 PROCEDURE — 93010 ELECTROCARDIOGRAM REPORT: CPT | Performed by: INTERNAL MEDICINE

## 2024-10-25 PROCEDURE — 70498 CT ANGIOGRAPHY NECK: CPT

## 2024-10-25 PROCEDURE — 85610 PROTHROMBIN TIME: CPT | Performed by: EMERGENCY MEDICINE

## 2024-10-25 PROCEDURE — 84484 ASSAY OF TROPONIN QUANT: CPT | Performed by: EMERGENCY MEDICINE

## 2024-10-25 PROCEDURE — 85025 COMPLETE CBC W/AUTO DIFF WBC: CPT | Performed by: EMERGENCY MEDICINE

## 2024-10-25 PROCEDURE — 85730 THROMBOPLASTIN TIME PARTIAL: CPT | Performed by: EMERGENCY MEDICINE

## 2024-10-25 PROCEDURE — 80053 COMPREHEN METABOLIC PANEL: CPT | Performed by: EMERGENCY MEDICINE

## 2024-10-25 PROCEDURE — 36415 COLL VENOUS BLD VENIPUNCTURE: CPT | Performed by: EMERGENCY MEDICINE

## 2024-10-25 PROCEDURE — 99285 EMERGENCY DEPT VISIT HI MDM: CPT | Performed by: EMERGENCY MEDICINE

## 2024-10-25 RX ADMIN — IOHEXOL 85 ML: 350 INJECTION, SOLUTION INTRAVENOUS at 18:27

## 2024-10-25 NOTE — ED PROVIDER NOTES
Time reflects when diagnosis was documented in both MDM as applicable and the Disposition within this note       Time User Action Codes Description Comment    10/25/2024  7:15 PM Charli Lombardo Add [R51.9] Headache     10/25/2024  7:15 PM Charli Lombardo Add [R42] Lightheaded           ED Disposition       ED Disposition   Discharge    Condition   Stable    Date/Time   Fri Oct 25, 2024  7:15 PM    Comment   Wilma Rowe discharge to home/self care.                   Assessment & Plan       Medical Decision Making  Amount and/or Complexity of Data Reviewed  Labs: ordered.  Radiology: ordered.    Risk  Prescription drug management.    59-year-old female presents with headache, nausea, lightheadedness  Patient without any neurologic deficits not concerning for acute stroke  However due to new headache will assess for possible intracranial abnormalities  Also locate cause for lightheadedness including anemia, electrolyte imbalances, arrhythmia, ACS  Imaging study was negative for any intracranial abnormalities or carotid artery abnormalities  Labs were also unremarkable  Patient was discharged with instruction follow-up with her PCP         Medications   iohexol (OMNIPAQUE) 350 MG/ML injection (MULTI-DOSE) 85 mL (85 mL Intravenous Given 10/25/24 1827)       ED Risk Strat Scores                           SBIRT 22yo+      Flowsheet Row Most Recent Value   Initial Alcohol Screen: US AUDIT-C     1. How often do you have a drink containing alcohol? 0 Filed at: 10/25/2024 1628   2. How many drinks containing alcohol do you have on a typical day you are drinking?  0 Filed at: 10/25/2024 1628   3a. Male UNDER 65: How often do you have five or more drinks on one occasion? 0 Filed at: 10/25/2024 1628   3b. FEMALE Any Age, or MALE 65+: How often do you have 4 or more drinks on one occassion? 0 Filed at: 10/25/2024 1628   Audit-C Score 0 Filed at: 10/25/2024 1628   RIANNA: How many times in the past year have you...    Used an illegal drug  or used a prescription medication for non-medical reasons? Never Filed at: 10/25/2024 3125                            History of Present Illness       Chief Complaint   Patient presents with    Medical Problem     Pt stated she had an twisted her head and now she is having head pains, dizziness, nausea, excessive burping. Pt recently dx with TGA. Pt took her BP at her daughters doctors apt and it was High, she normally runs low       Past Medical History:   Diagnosis Date    Colon polyp     Disease of thyroid gland     Urinary incontinence       Past Surgical History:   Procedure Laterality Date    COLONOSCOPY      LASER ABLATION OF THE CERVIX  2008    IL ANTERIOR COLPORRAPHY RPR CYSTOCELE W/CYSTO N/A 7/14/2021    Procedure: Cystocele Repair, Sacrospinous Ligament fixation;  Surgeon: Carlos Sharp MD;  [facility];  Service: Gynecology    IL CYSTOURETHROSCOPY N/A 7/14/2021    Procedure: CYSTOSCOPY;  Surgeon: Carlos Sharp MD;  [facility];  Service: Gynecology    IL SLING OPERATION STRESS INCONTINENCE N/A 7/14/2021    Procedure: INSERTION PUBOVAGINAL SLING (FEMALE);  Surgeon: Carlos Sharp MD;  [facility];  Service: Gynecology    THYROIDECTOMY        Family History   Problem Relation Age of Onset    No Known Problems Mother     No Known Problems Father     No Known Problems Sister     No Known Problems Sister     No Known Problems Sister     No Known Problems Sister     No Known Problems Daughter     No Known Problems Daughter     Thyroid cancer Daughter 28    No Known Problems Maternal Grandmother     No Known Problems Maternal Grandfather     No Known Problems Paternal Grandmother     No Known Problems Paternal Grandfather     No Known Problems Paternal Aunt     No Known Problems Paternal Aunt     Prostate cancer Brother 71    Leukemia Brother 12      Social History     Tobacco Use    Smoking status: Former     Types: Cigarettes    Smokeless tobacco: Never    Tobacco comments:     1/4  pack a day.   Vaping Use    Vaping status: Never Used   Substance Use Topics    Alcohol use: Yes     Comment: socially    Drug use: Not Currently      E-Cigarette/Vaping    E-Cigarette Use Never User       E-Cigarette/Vaping Substances    Nicotine No     THC No     CBD No     Flavoring No     Other No     Unknown No       I have reviewed and agree with the history as documented.     HPI  59-year-old female presenting with lightheadedness, head pressure, nausea.  Patient states that symptoms started roughly around 1 PM.  Patient with a history of transient global amnesia, thyroid disease status post thyroidectomy but otherwise no other significant past medical history.  Patient states that the symptom is somewhat resolved since then.  Denies any chest pain, shortness of breath, unilateral weakness, numbness.  Patient was also concerned because coinciding with that her blood pressure is also been elevated.  States that normally her blood pressure runs low.  Denies any other complaints.      Review of Systems   Constitutional:  Negative for chills, diaphoresis, fever and unexpected weight change.   HENT:  Negative for ear pain and sore throat.    Eyes:  Negative for visual disturbance.   Respiratory:  Negative for cough, chest tightness and shortness of breath.    Cardiovascular:  Negative for chest pain and leg swelling.   Gastrointestinal:  Positive for nausea. Negative for abdominal distention, abdominal pain, constipation, diarrhea and vomiting.   Endocrine: Negative.    Genitourinary:  Negative for difficulty urinating and dysuria.   Musculoskeletal: Negative.    Skin: Negative.    Allergic/Immunologic: Negative.    Neurological:  Positive for light-headedness and headaches.   Hematological: Negative.    Psychiatric/Behavioral: Negative.     All other systems reviewed and are negative.          Objective       ED Triage Vitals   Temperature Pulse Blood Pressure Respirations SpO2 Patient Position - Orthostatic VS    10/25/24 1625 10/25/24 1625 10/25/24 1625 10/25/24 1625 10/25/24 1625 10/25/24 1625   98.7 °F (37.1 °C) 65 (!) 171/88 18 99 % Sitting      Temp Source Heart Rate Source BP Location FiO2 (%) Pain Score    10/25/24 1625 10/25/24 1625 10/25/24 1625 -- 10/25/24 1920    Oral Monitor Right arm  4      Vitals      Date and Time Temp Pulse SpO2 Resp BP Pain Score FACES Pain Rating User   10/25/24 1920 -- 61 99 % 16 139/79 4 HEAD -- SR   10/25/24 1625 98.7 °F (37.1 °C) 65 99 % 18 171/88 -- -- RK            Physical Exam  Vitals and nursing note reviewed.   Constitutional:       General: She is not in acute distress.     Appearance: Normal appearance. She is not ill-appearing.   HENT:      Head: Normocephalic and atraumatic.      Right Ear: External ear normal.      Left Ear: External ear normal.      Nose: Nose normal.      Mouth/Throat:      Mouth: Mucous membranes are moist.      Pharynx: Oropharynx is clear.   Eyes:      General: No scleral icterus.        Right eye: No discharge.         Left eye: No discharge.      Extraocular Movements: Extraocular movements intact.      Conjunctiva/sclera: Conjunctivae normal.      Pupils: Pupils are equal, round, and reactive to light.   Cardiovascular:      Rate and Rhythm: Normal rate and regular rhythm.      Pulses: Normal pulses.      Heart sounds: Normal heart sounds.   Pulmonary:      Effort: Pulmonary effort is normal.      Breath sounds: Normal breath sounds.   Abdominal:      General: Abdomen is flat. Bowel sounds are normal. There is no distension.      Palpations: Abdomen is soft.      Tenderness: There is no abdominal tenderness. There is no guarding or rebound.   Musculoskeletal:         General: Normal range of motion.      Cervical back: Normal range of motion and neck supple.   Skin:     General: Skin is warm and dry.      Capillary Refill: Capillary refill takes less than 2 seconds.   Neurological:      General: No focal deficit present.      Mental Status: She is  alert and oriented to person, place, and time. Mental status is at baseline.      Cranial Nerves: No cranial nerve deficit.      Sensory: No sensory deficit.      Motor: No weakness.      Gait: Gait normal.   Psychiatric:         Mood and Affect: Mood normal.         Behavior: Behavior normal.         Thought Content: Thought content normal.         Judgment: Judgment normal.         Results Reviewed       Procedure Component Value Units Date/Time    HS Troponin 0hr (reflex protocol) [[id number]]  (Normal) Collected: 10/25/24 1738    Lab Status: Final result Specimen: Blood from Arm, Left Updated: 10/25/24 1807     hs TnI 0hr 3 ng/L     Comprehensive metabolic panel [[id number]] Collected: 10/25/24 1738    Lab Status: Final result Specimen: Blood from Arm, Left Updated: 10/25/24 1758     Sodium 138 mmol/L      Potassium 4.0 mmol/L      Chloride 103 mmol/L      CO2 30 mmol/L      ANION GAP 5 mmol/L      BUN 14 mg/dL      Creatinine 0.71 mg/dL      Glucose 109 mg/dL      Calcium 10.1 mg/dL      AST 28 U/L      ALT 10 U/L      Alkaline Phosphatase 56 U/L      Total Protein 7.0 g/dL      Albumin 4.3 g/dL      Total Bilirubin 0.35 mg/dL      eGFR 92 ml/min/1.73sq m     Narrative:      National Kid[employer] Foundation guidelines for Chronic Kidney Disease (CKD):     Stage 1 with normal or high GFR (GFR > 90 mL/min/1.73 square meters)    Stage 2 Mild CKD (GFR = 60-89 mL/min/1.73 square meters)    Stage 3A Moderate CKD (GFR = 45-59 mL/min/1.73 square meters)    Stage 3B Moderate CKD (GFR = 30-44 mL/min/1.73 square meters)    Stage 4 Severe CKD (GFR = 15-29 mL/min/1.73 square meters)    Stage 5 End Stage CKD (GFR <15 mL/min/1.73 square meters)  Note: GFR calculation is accurate only with a steady state creatinine    Protime-INR [[id number]]  (Normal) Collected: 10/25/24 1738    Lab Status: Final result Specimen: Blood from Arm, Left Updated: 10/25/24 1757     Protime 12.5 seconds      INR 0.91    Narrative:      INR  Therapeutic Range    Indication                                             INR Range      Atrial Fibrillation                                               2.0-3.0  Hypercoagulable State                                    2.0.2.3  Left Ventricular Asist Device                            2.0-3.0  Mechanical Heart Valve                                  -    Aortic(with afib, MI, embolism, HF, LA enlargement,    and/or coagulopathy)                                     2.0-3.0 (2.5-3.5)     Mitral                                                             2.5-3.5  Prosthetic/Bioprosthetic Heart Valve               2.0-3.0  Venous thromboembolism (VTE: VT, PE        2.0-3.0    APTT [[id number]]  (Normal) Collected: 10/25/24 1738    Lab Status: Final result Specimen: Blood from Arm, Left Updated: 10/25/24 1757     PTT 29 seconds     CBC and differential [[id number]] Collected: 10/25/24 1738    Lab Status: Final result Specimen: Blood from Arm, Left Updated: 10/25/24 1744     WBC 5.17 Thousand/uL      RBC 4.64 Million/uL      Hemoglobin 13.0 g/dL      Hematocrit 39.7 %      MCV 86 fL      MCH 28.0 pg      MCHC 32.7 g/dL      RDW 14.4 %      MPV 11.1 fL      Platelets 193 Thousands/uL      nRBC 0 /100 WBCs      Segmented % 68 %      Immature Grans % 0 %      Lymphocytes % 25 %      Monocytes % 5 %      Eosinophils Relative 1 %      Basophils Relative 1 %      Absolute Neutrophils 3.48 Thousands/µL      Absolute Immature Grans 0.01 Thousand/uL      Absolute Lymphocytes 1.30 Thousands/µL      Absolute Monocytes 0.28 Thousand/µL      Eosinophils Absolute 0.07 Thousand/µL      Basophils Absolute 0.03 Thousands/µL             CTA head and neck with and without contrast   Final Interpretation by Ed Castaneda MD (10/25 1859)      CT Brain:  No acute intracranial abnormality.      CT Angiography:   1. No large vessel occlusion or high-grade stenosis seen.   2. 2 mm likely infundibulum along the supraclinoid ICA on the  left. This appears similar to prior exam.                  Workstation performed: [id number]             Procedures    ED Medication and Procedure Management   Prior to Admission Medications   Prescriptions Last Dose Informant Patient Reported? Taking?   Magnesium 500 MG CAPS  Self Yes No   Sig: Take by mouth daily at bedtime   Multiple Vitamins-Minerals (ALIVE MULTI-VITAMIN PO)  Self Yes No   Sig: Take by mouth daily at bedtime   TURMERIC PO  Self Yes No   Sig: Take by mouth daily at bedtime   Vitamin D, Cholecalciferol, 10 MCG (400 UNIT) TABS  Self Yes No   Sig: Take by mouth   aspirin (ECOTRIN LOW STRENGTH) 81 mg EC tablet  Self Yes No   Sig: Take 81 mg by mouth daily   fluticasone (FLONASE) 50 mcg/act nasal spray  Self No No   Si spray into each nostril daily   Patient not taking: Reported on 2024   levothyroxine 100 mcg tablet  Self Yes No   Sig: Take 100 mcg by mouth daily   levothyroxine 88 mcg tablet   Yes No   Si mcg   naproxen (NAPROSYN) 500 mg tablet  Self No No   Sig: Take 1 tablet (500 mg total) by mouth 2 (two) times a day with meals   Patient not taking: Reported on 2024   nicotine (NICODERM CQ) 14 mg/24hr TD 24 hr patch  Self Yes No   Sig: Place 1 patch on the skin every 24 hours      Facility-Administered Medications: None     Discharge Medication List as of 10/25/2024  7:15 PM        CONTINUE these medications which have NOT CHANGED    Details   aspirin (ECOTRIN LOW STRENGTH) 81 mg EC tablet Take 81 mg by mouth daily, Historical Med      fluticasone (FLONASE) 50 mcg/act nasal spray 1 spray into each nostril daily, Starting Sat 2023, Normal      !! levothyroxine 100 mcg tablet Take 100 mcg by mouth daily, Starting Wed 10/7/2020, Historical Med      !! levothyroxine 88 mcg tablet 88 mcg, Starting Tu2024, Historical Med      Magnesium 500 MG CAPS Take by mouth daily at bedtime, Historical Med      Multiple Vitamins-Minerals (ALIVE MULTI-VITAMIN PO) Take by mouth daily at  bedtime, Historical Med      naproxen (NAPROSYN) 500 mg tablet Take 1 tablet (500 mg total) by mouth 2 (two) times a day with meals, Starting Mon 1/15/2024, Normal      nicotine (NICODERM CQ) 14 mg/24hr TD 24 hr patch Place 1 patch on the skin every 24 hours, Historical Med      TURMERIC PO Take by mouth daily at bedtime, Historical Med      Vitamin D, Cholecalciferol, 10 MCG (400 UNIT) TABS Take by mouth, Historical Med       !! - Potential duplicate medications found. Please discuss with provider.        No discharge procedures on file.  ED SEPSIS DOCUMENTATION   Time reflects when diagnosis was documented in both MDM as applicable and the Disposition within this note       Time User Action Codes Description Comment    10/25/2024  7:15 PM Charli Lombardo [R51.9] Headache     10/25/2024  7:15 PM Charli Lombardo [R42] Lightheaded                  Charli Lombardo MD  10/25/24 1405

## (undated) DEVICE — GLOVE INDICATOR PI UNDERGLOVE SZ 7.5 BLUE

## (undated) DEVICE — EXIDINE 4 PCT

## (undated) DEVICE — SYRINGE 50ML LL

## (undated) DEVICE — SUT VICRYL 0 UR-6 27 IN J603H

## (undated) DEVICE — STANDARD SURGICAL GOWN, L: Brand: CONVERTORS

## (undated) DEVICE — GLOVE PI ULTRA TOUCH SZ.7.5

## (undated) DEVICE — UNDYED BRAIDED (POLYGLACTIN 910), SYNTHETIC ABSORBABLE SUTURE: Brand: COATED VICRYL

## (undated) DEVICE — INTENDED FOR TISSUE SEPARATION, AND OTHER PROCEDURES THAT REQUIRE A SHARP SURGICAL BLADE TO PUNCTURE OR CUT.: Brand: BARD-PARKER SAFETY BLADES SIZE 11, STERILE

## (undated) DEVICE — PVC URETHRAL CATHETER: Brand: DOVER

## (undated) DEVICE — GLOVE PI ULTRA TOUCH SZ.7.0

## (undated) DEVICE — MEDI-VAC YANKAUER SUCTION HANDLE W/BULBOUS AND CONTROL VENT: Brand: CARDINAL HEALTH

## (undated) DEVICE — IV FLUSH NSS 10ML POSIFLUSH

## (undated) DEVICE — IV EXTENSION TUBING 33 IN

## (undated) DEVICE — 2000CC GUARDIAN II: Brand: GUARDIAN

## (undated) DEVICE — PREMIUM DRY TRAY LF: Brand: MEDLINE INDUSTRIES, INC.

## (undated) DEVICE — ADHESIVE SKIN HIGH VISCOSITY EXOFIN 1ML

## (undated) DEVICE — SUT VICRYL 0 CT-1 18 IN J740D

## (undated) DEVICE — DRAPE EQUIPMENT RF WAND

## (undated) DEVICE — NEEDLE COUNTER LG W/RULER

## (undated) DEVICE — SCD SEQUENTIAL COMPRESSION COMFORT SLEEVE MEDIUM KNEE LENGTH: Brand: KENDALL SCD

## (undated) DEVICE — SYRINGE 10ML LL

## (undated) DEVICE — ALLENTOWN DR  LUCENTE S LAP PK: Brand: CARDINAL HEALTH

## (undated) DEVICE — INTENDED FOR TISSUE SEPARATION, AND OTHER PROCEDURES THAT REQUIRE A SHARP SURGICAL BLADE TO PUNCTURE OR CUT.: Brand: BARD-PARKER SAFETY BLADES SIZE 15, STERILE

## (undated) DEVICE — TUBING SUCTION 5MM X 12 FT

## (undated) DEVICE — POOLE SUCTION HANDLE: Brand: CARDINAL HEALTH

## (undated) DEVICE — PACKING VAGINAL 2 IN

## (undated) DEVICE — GLOVE PI ULTRA TOUCH SZ.6.5

## (undated) DEVICE — BETHLEHEM UNIVERSAL MINOR VAG: Brand: CARDINAL HEALTH

## (undated) DEVICE — SUT VICRYL 2-0 CT-2 27 IN J269H

## (undated) DEVICE — 3M™ STERI-DRAPE™ UNDER BUTTOCKS DRAPE WITH POUCH 1084: Brand: STERI-DRAPE™

## (undated) DEVICE — CATH FOLEY 18FR 5ML 2 WAY UNCOATED SILICONE